# Patient Record
Sex: MALE | Race: WHITE | Employment: OTHER | URBAN - NONMETROPOLITAN AREA
[De-identification: names, ages, dates, MRNs, and addresses within clinical notes are randomized per-mention and may not be internally consistent; named-entity substitution may affect disease eponyms.]

---

## 2012-12-28 LAB
AVERAGE GLUCOSE: NORMAL
HBA1C MFR BLD: 5.4 %

## 2016-07-07 LAB
CHOLESTEROL, TOTAL: 174 MG/DL
CHOLESTEROL/HDL RATIO: 3.3
HDLC SERPL-MCNC: 53 MG/DL (ref 35–70)
LDL CHOLESTEROL CALCULATED: 98.4 MG/DL (ref 0–160)
TRIGL SERPL-MCNC: 113 MG/DL
VLDLC SERPL CALC-MCNC: 23 MG/DL

## 2017-06-08 RX ORDER — LISINOPRIL AND HYDROCHLOROTHIAZIDE 12.5; 1 MG/1; MG/1
1 TABLET ORAL DAILY
Refills: 1 | COMMUNITY
Start: 2017-05-11 | End: 2018-04-10 | Stop reason: SDUPTHER

## 2017-06-08 RX ORDER — MELOXICAM 15 MG/1
1 TABLET ORAL DAILY
Refills: 0 | COMMUNITY
Start: 2017-05-11 | End: 2017-06-08 | Stop reason: SDUPTHER

## 2017-06-08 RX ORDER — PRAVASTATIN SODIUM 40 MG
1 TABLET ORAL DAILY
Refills: 0 | COMMUNITY
Start: 2017-05-11 | End: 2017-08-07 | Stop reason: SDUPTHER

## 2017-06-08 RX ORDER — MELOXICAM 15 MG/1
15 TABLET ORAL DAILY
Qty: 30 TABLET | Refills: 5 | Status: SHIPPED | OUTPATIENT
Start: 2017-06-08 | End: 2017-12-28 | Stop reason: ALTCHOICE

## 2017-07-11 LAB
BUN BLDV-MCNC: NORMAL MG/DL
CALCIUM SERPL-MCNC: NORMAL MG/DL
CHLORIDE BLD-SCNC: NORMAL MMOL/L
CHOLESTEROL, TOTAL: 166 MG/DL
CHOLESTEROL/HDL RATIO: 3.5
CO2: NORMAL MMOL/L
CREAT SERPL-MCNC: NORMAL MG/DL
GFR CALCULATED: NORMAL
GLUCOSE BLD-MCNC: 98 MG/DL
HDLC SERPL-MCNC: 48 MG/DL (ref 35–70)
LDL CHOLESTEROL CALCULATED: 86.6 MG/DL (ref 0–160)
POTASSIUM SERPL-SCNC: NORMAL MMOL/L
SODIUM BLD-SCNC: NORMAL MMOL/L
TRIGL SERPL-MCNC: 157 MG/DL
VLDLC SERPL CALC-MCNC: 31 MG/DL

## 2017-07-24 VITALS
DIASTOLIC BLOOD PRESSURE: 90 MMHG | BODY MASS INDEX: 38.22 KG/M2 | HEIGHT: 70 IN | HEART RATE: 64 BPM | WEIGHT: 267 LBS | SYSTOLIC BLOOD PRESSURE: 136 MMHG

## 2017-07-24 DIAGNOSIS — K57.90 DIVERTICULOSIS OF INTESTINE WITHOUT PERFORATION OR ABSCESS WITHOUT BLEEDING: ICD-10-CM

## 2017-07-24 DIAGNOSIS — E66.9 OBESITY, UNSPECIFIED: ICD-10-CM

## 2017-07-24 DIAGNOSIS — M17.9 OSTEOARTHRITIS OF KNEE, UNSPECIFIED LATERALITY, UNSPECIFIED OSTEOARTHRITIS TYPE: ICD-10-CM

## 2017-07-24 DIAGNOSIS — I10 UNSPECIFIED ESSENTIAL HYPERTENSION: ICD-10-CM

## 2017-07-24 DIAGNOSIS — C61 MALIGNANT NEOPLASM OF PROSTATE (HCC): ICD-10-CM

## 2017-07-24 PROBLEM — E78.5 HYPERLIPIDEMIA: Status: ACTIVE | Noted: 2017-07-24

## 2017-07-24 RX ORDER — UBIQUINOL 100 MG
1 CAPSULE ORAL DAILY
COMMUNITY
End: 2019-10-30 | Stop reason: ALTCHOICE

## 2017-07-24 RX ORDER — OXYBUTYNIN CHLORIDE 5 MG/1
5 TABLET ORAL DAILY
COMMUNITY
End: 2018-11-12 | Stop reason: ALTCHOICE

## 2017-07-24 RX ORDER — ASCORBIC ACID 500 MG
500 TABLET ORAL DAILY
COMMUNITY
End: 2019-06-10 | Stop reason: ALTCHOICE

## 2017-07-24 RX ORDER — THIAMINE MONONITRATE (VIT B1) 100 MG
100 TABLET ORAL DAILY
COMMUNITY
End: 2017-12-28 | Stop reason: ALTCHOICE

## 2017-07-27 ENCOUNTER — OFFICE VISIT (OUTPATIENT)
Dept: FAMILY MEDICINE CLINIC | Age: 72
End: 2017-07-27
Payer: MEDICARE

## 2017-07-27 VITALS
SYSTOLIC BLOOD PRESSURE: 124 MMHG | BODY MASS INDEX: 35.14 KG/M2 | HEIGHT: 71 IN | DIASTOLIC BLOOD PRESSURE: 70 MMHG | HEART RATE: 66 BPM | WEIGHT: 251 LBS

## 2017-07-27 DIAGNOSIS — C61 MALIGNANT NEOPLASM OF PROSTATE (HCC): ICD-10-CM

## 2017-07-27 DIAGNOSIS — E78.2 MIXED HYPERLIPIDEMIA: Primary | ICD-10-CM

## 2017-07-27 DIAGNOSIS — R10.11 RIGHT UPPER QUADRANT PAIN: ICD-10-CM

## 2017-07-27 DIAGNOSIS — K57.90 DIVERTICULOSIS OF INTESTINE WITHOUT PERFORATION OR ABSCESS WITHOUT BLEEDING: ICD-10-CM

## 2017-07-27 DIAGNOSIS — E66.9 OBESITY, UNSPECIFIED: ICD-10-CM

## 2017-07-27 DIAGNOSIS — I10 UNSPECIFIED ESSENTIAL HYPERTENSION: ICD-10-CM

## 2017-07-27 DIAGNOSIS — G47.10 EXCESSIVE SOMNOLENCE DISORDER: ICD-10-CM

## 2017-07-27 DIAGNOSIS — M17.9 OSTEOARTHRITIS OF KNEE, UNSPECIFIED LATERALITY, UNSPECIFIED OSTEOARTHRITIS TYPE: ICD-10-CM

## 2017-07-27 PROCEDURE — 3017F COLORECTAL CA SCREEN DOC REV: CPT | Performed by: FAMILY MEDICINE

## 2017-07-27 PROCEDURE — 99214 OFFICE O/P EST MOD 30 MIN: CPT | Performed by: FAMILY MEDICINE

## 2017-07-27 PROCEDURE — G8417 CALC BMI ABV UP PARAM F/U: HCPCS | Performed by: FAMILY MEDICINE

## 2017-07-27 PROCEDURE — 1036F TOBACCO NON-USER: CPT | Performed by: FAMILY MEDICINE

## 2017-07-27 PROCEDURE — 4040F PNEUMOC VAC/ADMIN/RCVD: CPT | Performed by: FAMILY MEDICINE

## 2017-07-27 PROCEDURE — 1123F ACP DISCUSS/DSCN MKR DOCD: CPT | Performed by: FAMILY MEDICINE

## 2017-07-27 PROCEDURE — G8427 DOCREV CUR MEDS BY ELIG CLIN: HCPCS | Performed by: FAMILY MEDICINE

## 2017-07-27 RX ORDER — UBIDECARENONE 10 MG
1 CAPSULE ORAL DAILY
COMMUNITY

## 2017-07-27 ASSESSMENT — PATIENT HEALTH QUESTIONNAIRE - PHQ9
SUM OF ALL RESPONSES TO PHQ9 QUESTIONS 1 & 2: 0
2. FEELING DOWN, DEPRESSED OR HOPELESS: 0
1. LITTLE INTEREST OR PLEASURE IN DOING THINGS: 0
SUM OF ALL RESPONSES TO PHQ QUESTIONS 1-9: 0

## 2017-07-30 ASSESSMENT — ENCOUNTER SYMPTOMS
BLOOD IN STOOL: 0
ABDOMINAL PAIN: 1
SORE THROAT: 0
ABDOMINAL DISTENTION: 0
SHORTNESS OF BREATH: 0
WHEEZING: 0
CHEST TIGHTNESS: 0
CONSTIPATION: 0

## 2017-08-03 DIAGNOSIS — R10.11 RIGHT UPPER QUADRANT PAIN: ICD-10-CM

## 2017-08-07 DIAGNOSIS — E78.00 PURE HYPERCHOLESTEROLEMIA: Primary | ICD-10-CM

## 2017-08-07 RX ORDER — PRAVASTATIN SODIUM 40 MG
TABLET ORAL
Qty: 30 TABLET | Refills: 5 | Status: SHIPPED | OUTPATIENT
Start: 2017-08-07 | End: 2017-08-11 | Stop reason: SDUPTHER

## 2017-08-10 ENCOUNTER — TELEPHONE (OUTPATIENT)
Dept: FAMILY MEDICINE CLINIC | Age: 72
End: 2017-08-10

## 2017-08-10 DIAGNOSIS — R07.9 CHEST PAIN, UNSPECIFIED TYPE: ICD-10-CM

## 2017-08-10 DIAGNOSIS — R93.2 ABNORMAL ULTRASOUND OF GALLBLADDER: Primary | ICD-10-CM

## 2017-08-11 DIAGNOSIS — E78.00 PURE HYPERCHOLESTEROLEMIA: ICD-10-CM

## 2017-08-11 RX ORDER — PRAVASTATIN SODIUM 40 MG
TABLET ORAL
Qty: 30 TABLET | Refills: 5 | Status: SHIPPED | OUTPATIENT
Start: 2017-08-11 | End: 2017-12-26 | Stop reason: SDUPTHER

## 2017-09-26 ENCOUNTER — OFFICE VISIT (OUTPATIENT)
Dept: FAMILY MEDICINE CLINIC | Age: 72
End: 2017-09-26
Payer: MEDICARE

## 2017-09-26 VITALS
HEART RATE: 60 BPM | SYSTOLIC BLOOD PRESSURE: 112 MMHG | DIASTOLIC BLOOD PRESSURE: 70 MMHG | BODY MASS INDEX: 35.01 KG/M2 | WEIGHT: 251 LBS

## 2017-09-26 DIAGNOSIS — Z23 NEED FOR PROPHYLACTIC VACCINATION AND INOCULATION AGAINST INFLUENZA: ICD-10-CM

## 2017-09-26 DIAGNOSIS — E78.2 MIXED HYPERLIPIDEMIA: ICD-10-CM

## 2017-09-26 DIAGNOSIS — E66.9 OBESITY, UNSPECIFIED: ICD-10-CM

## 2017-09-26 DIAGNOSIS — I49.5 SICK SINUS SYNDROME (HCC): Primary | ICD-10-CM

## 2017-09-26 DIAGNOSIS — I44.2 THIRD DEGREE HEART BLOCK (HCC): ICD-10-CM

## 2017-09-26 DIAGNOSIS — C61 MALIGNANT NEOPLASM OF PROSTATE (HCC): ICD-10-CM

## 2017-09-26 DIAGNOSIS — I10 UNSPECIFIED ESSENTIAL HYPERTENSION: ICD-10-CM

## 2017-09-26 PROCEDURE — 1036F TOBACCO NON-USER: CPT | Performed by: FAMILY MEDICINE

## 2017-09-26 PROCEDURE — G8417 CALC BMI ABV UP PARAM F/U: HCPCS | Performed by: FAMILY MEDICINE

## 2017-09-26 PROCEDURE — G0008 ADMIN INFLUENZA VIRUS VAC: HCPCS | Performed by: FAMILY MEDICINE

## 2017-09-26 PROCEDURE — 90662 IIV NO PRSV INCREASED AG IM: CPT | Performed by: FAMILY MEDICINE

## 2017-09-26 PROCEDURE — 4040F PNEUMOC VAC/ADMIN/RCVD: CPT | Performed by: FAMILY MEDICINE

## 2017-09-26 PROCEDURE — 99214 OFFICE O/P EST MOD 30 MIN: CPT | Performed by: FAMILY MEDICINE

## 2017-09-26 PROCEDURE — 1123F ACP DISCUSS/DSCN MKR DOCD: CPT | Performed by: FAMILY MEDICINE

## 2017-09-26 PROCEDURE — 3017F COLORECTAL CA SCREEN DOC REV: CPT | Performed by: FAMILY MEDICINE

## 2017-09-26 PROCEDURE — G8427 DOCREV CUR MEDS BY ELIG CLIN: HCPCS | Performed by: FAMILY MEDICINE

## 2017-09-28 PROBLEM — I49.5 SICK SINUS SYNDROME (HCC): Status: ACTIVE | Noted: 2017-09-28

## 2017-09-28 PROBLEM — I44.2 THIRD DEGREE HEART BLOCK (HCC): Status: ACTIVE | Noted: 2017-08-01

## 2017-09-28 ASSESSMENT — ENCOUNTER SYMPTOMS
CHEST TIGHTNESS: 0
SORE THROAT: 0
WHEEZING: 0
ABDOMINAL DISTENTION: 0
ABDOMINAL PAIN: 0
SHORTNESS OF BREATH: 0

## 2017-10-10 DIAGNOSIS — I49.5 SICK SINUS SYNDROME (HCC): Primary | ICD-10-CM

## 2017-10-11 ENCOUNTER — HOSPITAL ENCOUNTER (OUTPATIENT)
Dept: PULMONOLOGY | Age: 72
Discharge: HOME | End: 2017-10-11
Payer: MEDICARE

## 2017-10-24 ENCOUNTER — TELEPHONE (OUTPATIENT)
Dept: FAMILY MEDICINE CLINIC | Age: 72
End: 2017-10-24

## 2017-10-24 NOTE — TELEPHONE ENCOUNTER
recv'd call from pharmacy that pt was recently started on eliquis and there is a drug interaction with his meloxicam and wondering if pt should stop the meloxicam?

## 2017-12-26 DIAGNOSIS — E78.00 PURE HYPERCHOLESTEROLEMIA: ICD-10-CM

## 2017-12-26 RX ORDER — PRAVASTATIN SODIUM 40 MG
TABLET ORAL
Qty: 90 TABLET | Refills: 3 | Status: SHIPPED | OUTPATIENT
Start: 2017-12-26 | End: 2018-04-16 | Stop reason: SINTOL

## 2017-12-28 ENCOUNTER — OFFICE VISIT (OUTPATIENT)
Dept: FAMILY MEDICINE CLINIC | Age: 72
End: 2017-12-28
Payer: MEDICARE

## 2017-12-28 VITALS
HEART RATE: 60 BPM | BODY MASS INDEX: 35.98 KG/M2 | RESPIRATION RATE: 12 BRPM | SYSTOLIC BLOOD PRESSURE: 124 MMHG | DIASTOLIC BLOOD PRESSURE: 68 MMHG | HEIGHT: 71 IN | WEIGHT: 257 LBS

## 2017-12-28 DIAGNOSIS — I49.5 SICK SINUS SYNDROME (HCC): ICD-10-CM

## 2017-12-28 DIAGNOSIS — E78.2 MIXED HYPERLIPIDEMIA: ICD-10-CM

## 2017-12-28 DIAGNOSIS — G31.84 MILD COGNITIVE IMPAIRMENT WITH MEMORY LOSS: ICD-10-CM

## 2017-12-28 DIAGNOSIS — I10 ESSENTIAL HYPERTENSION: Primary | ICD-10-CM

## 2017-12-28 DIAGNOSIS — I48.0 PAROXYSMAL ATRIAL FIBRILLATION (HCC): ICD-10-CM

## 2017-12-28 DIAGNOSIS — C61 MALIGNANT NEOPLASM OF PROSTATE (HCC): ICD-10-CM

## 2017-12-28 DIAGNOSIS — I44.2 THIRD DEGREE HEART BLOCK (HCC): ICD-10-CM

## 2017-12-28 PROCEDURE — G8427 DOCREV CUR MEDS BY ELIG CLIN: HCPCS | Performed by: FAMILY MEDICINE

## 2017-12-28 PROCEDURE — G8417 CALC BMI ABV UP PARAM F/U: HCPCS | Performed by: FAMILY MEDICINE

## 2017-12-28 PROCEDURE — 99214 OFFICE O/P EST MOD 30 MIN: CPT | Performed by: FAMILY MEDICINE

## 2017-12-28 PROCEDURE — 1123F ACP DISCUSS/DSCN MKR DOCD: CPT | Performed by: FAMILY MEDICINE

## 2017-12-28 PROCEDURE — 4040F PNEUMOC VAC/ADMIN/RCVD: CPT | Performed by: FAMILY MEDICINE

## 2017-12-28 PROCEDURE — G8484 FLU IMMUNIZE NO ADMIN: HCPCS | Performed by: FAMILY MEDICINE

## 2017-12-28 PROCEDURE — 3017F COLORECTAL CA SCREEN DOC REV: CPT | Performed by: FAMILY MEDICINE

## 2017-12-28 PROCEDURE — 1036F TOBACCO NON-USER: CPT | Performed by: FAMILY MEDICINE

## 2017-12-28 RX ORDER — APIXABAN 5 MG/1
5 TABLET, FILM COATED ORAL 2 TIMES DAILY
Refills: 0 | COMMUNITY
Start: 2017-12-22

## 2017-12-28 ASSESSMENT — ENCOUNTER SYMPTOMS
SORE THROAT: 0
WHEEZING: 0
ABDOMINAL PAIN: 0
ABDOMINAL DISTENTION: 0
CHEST TIGHTNESS: 0
SHORTNESS OF BREATH: 0

## 2017-12-28 NOTE — PROGRESS NOTES
[address] [address] [address]  Dept: [phone]  Dept Fax: [phone]    Honey Alicia is a 67 y.o. male who presents today for his medical conditions/complaints as noted below. Honey Alicia is c/o of Follow-up (3 month) and Other (wife is concerned with short term memory loss and has had follow up with cardiology)      HPI:     HPI   Patient is here for a routine follow up   Afib  Anticoagulation therapy - on eliquis ; Dr Pool Looney started at his last visit ;in October Dx paroxysmal Afib   Palpitations - none   SOB - none  Syncope -   SHONDA -   Cardiologist - Dr Pool Looney   Echo -   Holter -   Stress test -   Cardiac cath -   Hypertension  Rarely checks   well controlled; BP: 124/68   No problems with medication side effects; tolerating well  No chest pain  No edema   Hyperlipidemia   Well controlled;  Cholesterol 166; HDL 48; LDL 86   Medication side effects -   Cardiovascular risk factors -   Chest pain -   Claudication -   SHONDA  None ; he cancelled his appointment with Dr Jill Tejeda think he has SHONDA; ever since pacemaker placed he hasn't had any of those spells ; but notes from Dr Pool Looney would suggest otherwise  Feels rested upon awakening  Denies excessive somnolence  Last evaluated  Pulmonologist: none   Pulmonary function tests: none    Wife reports patient is having problems with his short term memory and wants to try prevagen  Patient doesn't think there is any problem     BP Readings from Last 3 Encounters:   12/28/17 124/68   09/26/17 112/70   07/27/17 124/70            (goal 120/80)    Past Medical History:   Diagnosis Date    Diverticulosis     Hyperlipidemia     Low HDL    Hypertension     Hyperuricemia     Obesity     Osteoarthrosis     not designated as generalized or localized    Prostate cancer ([facility] [address] 2008    Third degree heart block ([facility] [address] 08/2017      Past Surgical History:   Procedure Laterality Date    A-V CARDIAC

## 2018-03-05 DIAGNOSIS — M17.10 PRIMARY LOCALIZED OSTEOARTHROSIS OF LOWER LEG, UNSPECIFIED LATERALITY: Primary | ICD-10-CM

## 2018-03-05 RX ORDER — MELOXICAM 15 MG/1
TABLET ORAL
Qty: 30 TABLET | Refills: 5 | Status: SHIPPED | OUTPATIENT
Start: 2018-03-05 | End: 2019-05-21 | Stop reason: SDUPTHER

## 2018-03-05 NOTE — TELEPHONE ENCOUNTER
Pradeep Pina is calling to request a refill on the following medication(s):  Requested Prescriptions     Pending Prescriptions Disp Refills    meloxicam (MOBIC) 15 MG tablet [Pharmacy Med Name: MELOXICAM 15 MG TABLET] 30 tablet 5     Sig: take 1 tablet by mouth once daily       Last Visit Date (If Applicable):  15/56/4424    Next Visit Date:    3/30/2018

## 2018-03-14 DIAGNOSIS — E78.2 MIXED HYPERLIPIDEMIA: ICD-10-CM

## 2018-03-14 DIAGNOSIS — I10 ESSENTIAL HYPERTENSION: ICD-10-CM

## 2018-03-14 DIAGNOSIS — R07.9 CHEST PAIN, UNSPECIFIED TYPE: ICD-10-CM

## 2018-03-14 LAB
A/G RATIO: 1.6 RATIO
AGE FOR GFR: 72
ALBUMIN: 4.2 G/DL
ALK PHOSPHATASE: 170 UNITS/L
ALT SERPL-CCNC: 384 UNITS/L
ANION GAP SERPL CALCULATED.3IONS-SCNC: 18 MMOL/L
AST SERPL-CCNC: 216 UNITS/L
BASOPHILS # BLD: 0.06 THOU/MM3
BILIRUB SERPL-MCNC: 0.8 MG/DL
BUN BLDV-MCNC: 19 MG/DL
CALCIUM SERPL-MCNC: 9.7 MG/DL
CHLORIDE BLD-SCNC: 99 MMOL/L
CHOLESTEROL/HDL RATIO: 3.8 RATIO
CHOLESTEROL: 192 MG/DL
CO2: 27 MMOL/L
CREAT SERPL-MCNC: 1.3 MG/DL
DIFFERENTIAL: AUTOMATED DIFF
EGFR BF: 49 ML/MIN/1.73 M2
EGFR BM: 66 ML/MIN/1.73 M2
EGFR WF: 40 ML/MIN/1.73 M2
EGFR WM: 54 ML/MIN/1.73 M2
EOSINOPHIL # BLD: 0.2 THOU/MM3
GLOBULIN: 2.6 G/DL
GLUCOSE: 96 MG/DL
HCT VFR BLD CALC: 40.9 %
HDL, DIRECT: 51 MG/DL
HEMOGLOBIN: 13.5 G/DL
LDL CHOLESTEROL CALCULATED: 116.4 MG/DL
LYMPHOCYTES # BLD: 0.76 THOU/MM3
MCH RBC QN AUTO: 29.8 PG
MCHC RBC AUTO-ENTMCNC: 33 G/DL
MCV RBC AUTO: 90.1 FL
MONOCYTES # BLD: 0.61 THOU/MM3
NEUTROPHILS: 4.91 THOU/MM3
PDW BLD-RTO: 11.9 %
PLATELET # BLD: 194 THOU/MM3
PMV BLD AUTO: 9.3 FL
POTASSIUM SERPL-SCNC: 4.8 MMOL/L
RBC # BLD: 4.54 M/UL
SODIUM BLD-SCNC: 139 MMOL/L
TOTAL PROTEIN: 6.8 G/DL
TRIGL SERPL-MCNC: 123 MG/DL
VLDLC SERPL CALC-MCNC: 25 MG/DL
WBC # BLD: 6.54 THOU/ML3

## 2018-03-19 ENCOUNTER — TELEPHONE (OUTPATIENT)
Dept: FAMILY MEDICINE CLINIC | Age: 73
End: 2018-03-19

## 2018-04-10 DIAGNOSIS — I10 ESSENTIAL HYPERTENSION: Primary | ICD-10-CM

## 2018-04-10 RX ORDER — LISINOPRIL AND HYDROCHLOROTHIAZIDE 12.5; 1 MG/1; MG/1
TABLET ORAL
Qty: 30 TABLET | Refills: 11 | Status: SHIPPED | OUTPATIENT
Start: 2018-04-10 | End: 2019-04-03 | Stop reason: SDUPTHER

## 2018-04-16 ENCOUNTER — OFFICE VISIT (OUTPATIENT)
Dept: FAMILY MEDICINE CLINIC | Age: 73
End: 2018-04-16
Payer: MEDICARE

## 2018-04-16 VITALS
DIASTOLIC BLOOD PRESSURE: 76 MMHG | SYSTOLIC BLOOD PRESSURE: 126 MMHG | HEART RATE: 64 BPM | BODY MASS INDEX: 33.17 KG/M2 | WEIGHT: 237.8 LBS

## 2018-04-16 DIAGNOSIS — E66.09 CLASS 1 OBESITY DUE TO EXCESS CALORIES WITHOUT SERIOUS COMORBIDITY WITH BODY MASS INDEX (BMI) OF 33.0 TO 33.9 IN ADULT: ICD-10-CM

## 2018-04-16 DIAGNOSIS — Z12.5 SCREENING PSA (PROSTATE SPECIFIC ANTIGEN): ICD-10-CM

## 2018-04-16 DIAGNOSIS — I10 ESSENTIAL HYPERTENSION: Primary | ICD-10-CM

## 2018-04-16 DIAGNOSIS — I49.5 SICK SINUS SYNDROME (HCC): ICD-10-CM

## 2018-04-16 DIAGNOSIS — E78.2 MIXED HYPERLIPIDEMIA: ICD-10-CM

## 2018-04-16 DIAGNOSIS — C61 MALIGNANT NEOPLASM OF PROSTATE (HCC): ICD-10-CM

## 2018-04-16 DIAGNOSIS — R32 URINARY INCONTINENCE, UNSPECIFIED TYPE: ICD-10-CM

## 2018-04-16 DIAGNOSIS — K80.71 CALCULUS OF GALLBLADDER AND BILE DUCT WITH OBSTRUCTION WITHOUT CHOLECYSTITIS: ICD-10-CM

## 2018-04-16 DIAGNOSIS — I44.2 THIRD DEGREE HEART BLOCK (HCC): ICD-10-CM

## 2018-04-16 LAB
A/G RATIO: 1.5 RATIO
ALBUMIN: 4.1 G/DL
ALK PHOSPHATASE: 51 UNITS/L
ALT SERPL-CCNC: 33 UNITS/L
AST SERPL-CCNC: 20 UNITS/L
BILIRUB SERPL-MCNC: 0.4 MG/DL
BILIRUBIN DIRECT: 0 MG/DL
GLOBULIN: 2.8 G/DL
SCREENING PSA: <0.06 NG/ML
TOTAL PROTEIN: 6.9 G/DL

## 2018-04-16 PROCEDURE — 1123F ACP DISCUSS/DSCN MKR DOCD: CPT | Performed by: FAMILY MEDICINE

## 2018-04-16 PROCEDURE — 99214 OFFICE O/P EST MOD 30 MIN: CPT | Performed by: FAMILY MEDICINE

## 2018-04-16 PROCEDURE — G8417 CALC BMI ABV UP PARAM F/U: HCPCS | Performed by: FAMILY MEDICINE

## 2018-04-16 PROCEDURE — 1036F TOBACCO NON-USER: CPT | Performed by: FAMILY MEDICINE

## 2018-04-16 PROCEDURE — 4040F PNEUMOC VAC/ADMIN/RCVD: CPT | Performed by: FAMILY MEDICINE

## 2018-04-16 PROCEDURE — 3017F COLORECTAL CA SCREEN DOC REV: CPT | Performed by: FAMILY MEDICINE

## 2018-04-16 PROCEDURE — G8427 DOCREV CUR MEDS BY ELIG CLIN: HCPCS | Performed by: FAMILY MEDICINE

## 2018-04-16 ASSESSMENT — ENCOUNTER SYMPTOMS
SHORTNESS OF BREATH: 0
CHEST TIGHTNESS: 0
WHEEZING: 0
ABDOMINAL DISTENTION: 0
ABDOMINAL PAIN: 0
SORE THROAT: 0

## 2018-08-20 ENCOUNTER — OFFICE VISIT (OUTPATIENT)
Dept: FAMILY MEDICINE CLINIC | Age: 73
End: 2018-08-20
Payer: MEDICARE

## 2018-08-20 VITALS
OXYGEN SATURATION: 95 % | HEART RATE: 88 BPM | WEIGHT: 234 LBS | BODY MASS INDEX: 32.64 KG/M2 | DIASTOLIC BLOOD PRESSURE: 76 MMHG | SYSTOLIC BLOOD PRESSURE: 130 MMHG

## 2018-08-20 DIAGNOSIS — I49.5 SICK SINUS SYNDROME (HCC): ICD-10-CM

## 2018-08-20 DIAGNOSIS — Z12.11 ENCOUNTER FOR SCREENING COLONOSCOPY: ICD-10-CM

## 2018-08-20 DIAGNOSIS — I44.2 THIRD DEGREE HEART BLOCK (HCC): ICD-10-CM

## 2018-08-20 DIAGNOSIS — R32 URINARY INCONTINENCE, UNSPECIFIED TYPE: ICD-10-CM

## 2018-08-20 DIAGNOSIS — C61 MALIGNANT NEOPLASM OF PROSTATE (HCC): ICD-10-CM

## 2018-08-20 DIAGNOSIS — G47.10 EXCESSIVE SOMNOLENCE DISORDER: ICD-10-CM

## 2018-08-20 DIAGNOSIS — I10 ESSENTIAL HYPERTENSION: Primary | ICD-10-CM

## 2018-08-20 DIAGNOSIS — Z11.59 NEED FOR HEPATITIS C SCREENING TEST: ICD-10-CM

## 2018-08-20 DIAGNOSIS — E78.2 MIXED HYPERLIPIDEMIA: ICD-10-CM

## 2018-08-20 DIAGNOSIS — R41.3 SHORT-TERM MEMORY LOSS: ICD-10-CM

## 2018-08-20 PROCEDURE — 99214 OFFICE O/P EST MOD 30 MIN: CPT | Performed by: FAMILY MEDICINE

## 2018-08-20 PROCEDURE — 1123F ACP DISCUSS/DSCN MKR DOCD: CPT | Performed by: FAMILY MEDICINE

## 2018-08-20 PROCEDURE — 1101F PT FALLS ASSESS-DOCD LE1/YR: CPT | Performed by: FAMILY MEDICINE

## 2018-08-20 PROCEDURE — G0444 DEPRESSION SCREEN ANNUAL: HCPCS | Performed by: FAMILY MEDICINE

## 2018-08-20 PROCEDURE — 1036F TOBACCO NON-USER: CPT | Performed by: FAMILY MEDICINE

## 2018-08-20 PROCEDURE — G8417 CALC BMI ABV UP PARAM F/U: HCPCS | Performed by: FAMILY MEDICINE

## 2018-08-20 PROCEDURE — 4040F PNEUMOC VAC/ADMIN/RCVD: CPT | Performed by: FAMILY MEDICINE

## 2018-08-20 PROCEDURE — 3017F COLORECTAL CA SCREEN DOC REV: CPT | Performed by: FAMILY MEDICINE

## 2018-08-20 PROCEDURE — G8427 DOCREV CUR MEDS BY ELIG CLIN: HCPCS | Performed by: FAMILY MEDICINE

## 2018-08-20 RX ORDER — OMEGA-3S/DHA/EPA/FISH OIL/D3 300MG-1000
400 CAPSULE ORAL DAILY
COMMUNITY
End: 2019-06-10 | Stop reason: ALTCHOICE

## 2018-08-20 RX ORDER — DONEPEZIL HYDROCHLORIDE 5 MG/1
5 TABLET, FILM COATED ORAL NIGHTLY
Qty: 30 TABLET | Refills: 3 | Status: SHIPPED | OUTPATIENT
Start: 2018-08-20 | End: 2018-12-30 | Stop reason: SDUPTHER

## 2018-08-20 RX ORDER — CHLORAL HYDRATE 500 MG
3000 CAPSULE ORAL 3 TIMES DAILY
COMMUNITY
End: 2019-06-10 | Stop reason: ALTCHOICE

## 2018-08-20 RX ORDER — LANOLIN ALCOHOL/MO/W.PET/CERES
1000 CREAM (GRAM) TOPICAL DAILY
COMMUNITY

## 2018-08-20 ASSESSMENT — PATIENT HEALTH QUESTIONNAIRE - PHQ9
6. FEELING BAD ABOUT YOURSELF - OR THAT YOU ARE A FAILURE OR HAVE LET YOURSELF OR YOUR FAMILY DOWN: 1
8. MOVING OR SPEAKING SO SLOWLY THAT OTHER PEOPLE COULD HAVE NOTICED. OR THE OPPOSITE, BEING SO FIGETY OR RESTLESS THAT YOU HAVE BEEN MOVING AROUND A LOT MORE THAN USUAL: 0
3. TROUBLE FALLING OR STAYING ASLEEP: 0
9. THOUGHTS THAT YOU WOULD BE BETTER OFF DEAD, OR OF HURTING YOURSELF: 0
SUM OF ALL RESPONSES TO PHQ9 QUESTIONS 1 & 2: 4
1. LITTLE INTEREST OR PLEASURE IN DOING THINGS: 2
SUM OF ALL RESPONSES TO PHQ QUESTIONS 1-9: 8
10. IF YOU CHECKED OFF ANY PROBLEMS, HOW DIFFICULT HAVE THESE PROBLEMS MADE IT FOR YOU TO DO YOUR WORK, TAKE CARE OF THINGS AT HOME, OR GET ALONG WITH OTHER PEOPLE: 0
2. FEELING DOWN, DEPRESSED OR HOPELESS: 2
7. TROUBLE CONCENTRATING ON THINGS, SUCH AS READING THE NEWSPAPER OR WATCHING TELEVISION: 1
SUM OF ALL RESPONSES TO PHQ QUESTIONS 1-9: 8
5. POOR APPETITE OR OVEREATING: 0
4. FEELING TIRED OR HAVING LITTLE ENERGY: 2

## 2018-08-21 ASSESSMENT — ENCOUNTER SYMPTOMS
SHORTNESS OF BREATH: 0
APNEA: 0
ABDOMINAL PAIN: 0
CHEST TIGHTNESS: 0
ABDOMINAL DISTENTION: 0
WHEEZING: 0
SORE THROAT: 0

## 2018-08-21 NOTE — PROGRESS NOTES
to have some apneic spells then. He has refused workup to date but today is agreeable. His wife says he does not snore quite as heavily. She does not know if he is choking in his sleep but she and the patient are in agreement that they would like to proceed with sleep testing. Short-term memory loss  As noted above, the addition of Prevagen is of questionable efficacy. The patient and wife are agreeable to trying something else to help what is mostly short-term memory. It sounds like he has good long term memory or distant memory. He does not get lost.  He can remember directions. No behavioral changes. BP Readings from Last 3 Encounters:   18 130/76   18 126/76   17 124/68            (goal 120/80)    Past Medical History:   Diagnosis Date    Diverticulosis     Hyperlipidemia     Low HDL    Hypertension     Hyperuricemia     Obesity     Osteoarthrosis     not designated as generalized or localized    Prostate cancer ([address]     Third degree heart block ([address] 2017      Past Surgical History:   Procedure Laterality Date    [facility] Thaddeus Lee;  [facility]    COLONOSCOPY  2007    diverticulosis    FIBULA FRACTURE SURGERY Right 2000    [facility] [place] [facility]    HIP ARTHROPLASTY Bilateral ,    Dr Syed Gambino  2017    PROSTATE BIOPSY  07/10/2008    PROSTATECTOMY  2008    w/lymph node dissection Dr Haley Garcia Right 10/2013    Dr Lilia Barrientos,     Flexible     Family History   Problem Relation Age of Onset    Alzheimer's Disease Mother     Emphysema Father     Other Father         vascular disease- of aneurysm     Social History   Substance Use Topics    Smoking status: Former Smoker     Packs/day: 2.00     Years: 15.00     Quit date: 10/18/1981    Smokeless tobacco: Never Used    Alcohol use No        Current Outpatient Prescriptions   Medication Sig Dispense Refill    vitamin B-12 (CYANOCOBALAMIN) 1000 MCG tablet Take 1,000 mcg by mouth daily      vitamin D3 (CHOLECALCIFEROL) 400 units TABS tablet Take 400 Units by mouth daily      Omega-3 Fatty Acids (FISH OIL) 1000 MG CAPS Take 3,000 mg by mouth 3 times daily      donepezil (ARICEPT) 5 MG tablet Take 1 tablet by mouth nightly 30 tablet 3    Mirabegron ER 25 MG TB24 Take 1 tablet by mouth daily 30 tablet 5    lisinopril-hydrochlorothiazide (PRINZIDE;ZESTORETIC) 10-12.5 MG per tablet take 1 tablet by mouth once daily 30 tablet 11    ELIQUIS 5 MG TABS tablet Take 5 mg by mouth daily  0    Coenzyme Q10 10 MG CAPS Take 1 capsule by mouth daily      aspirin 81 MG tablet Take 81 mg by mouth daily      Ascorbic Acid (VITAMIN C) 500 MG tablet Take 500 mg by mouth daily      oxybutynin (DITROPAN) 5 MG tablet Take 5 mg by mouth daily Indications: Extended Release      Glucosamine 750 MG TABS Take 1 capsule by mouth daily      meloxicam (MOBIC) 15 MG tablet take 1 tablet by mouth once daily 30 tablet 5     No current facility-administered medications for this visit.       Allergies   Allergen Reactions    No Known Allergies      Other reaction(s): Unknown Reaction    Pravastatin        Health Maintenance   Topic Date Due    Hepatitis C screen  1945    DTaP/Tdap/Td vaccine (1 - Tdap) 08/21/2008    Shingles Vaccine (1 of 2 - 2 Dose Series) 01/23/2010    Colon cancer screen colonoscopy  01/11/2017    Flu vaccine (1) 09/01/2018    Potassium monitoring  03/14/2019    Creatinine monitoring  03/14/2019    Lipid screen  03/14/2023    Pneumococcal low/med risk  Completed    AAA screen  Completed       Lab Results   Component Value Date    K 4.8 03/14/2018    CREATININE 1.3 03/14/2018    AST 20 04/16/2018    ALT 33 04/16/2018    HCT 41.4 07/06/2018    LABA1C 5.4 12/28/2012    GLUCOSE 96 03/14/2018    CALCIUM 9.7 03/14/2018      Lab Results   Component Value Date    CHOL 192 03/14/2018    CHOL 166

## 2018-08-22 LAB
HEPATITIS C IGG: NORMAL
SIGNAL/CUTOFF: NORMAL

## 2018-09-04 ENCOUNTER — OFFICE VISIT (OUTPATIENT)
Dept: SURGERY | Age: 73
End: 2018-09-04

## 2018-09-04 VITALS
SYSTOLIC BLOOD PRESSURE: 112 MMHG | TEMPERATURE: 96 F | WEIGHT: 234 LBS | HEIGHT: 71 IN | HEART RATE: 59 BPM | BODY MASS INDEX: 32.76 KG/M2 | DIASTOLIC BLOOD PRESSURE: 67 MMHG

## 2018-09-04 DIAGNOSIS — Z12.11 ENCOUNTER FOR SCREENING COLONOSCOPY: Primary | ICD-10-CM

## 2018-09-04 PROCEDURE — 99999 PR OFFICE/OUTPT VISIT,PROCEDURE ONLY: CPT | Performed by: SURGERY

## 2018-09-04 PROCEDURE — 1101F PT FALLS ASSESS-DOCD LE1/YR: CPT | Performed by: SURGERY

## 2018-09-04 PROCEDURE — 1123F ACP DISCUSS/DSCN MKR DOCD: CPT | Performed by: SURGERY

## 2018-09-04 PROCEDURE — G8417 CALC BMI ABV UP PARAM F/U: HCPCS | Performed by: SURGERY

## 2018-09-04 PROCEDURE — G8427 DOCREV CUR MEDS BY ELIG CLIN: HCPCS | Performed by: SURGERY

## 2018-09-04 PROCEDURE — 4040F PNEUMOC VAC/ADMIN/RCVD: CPT | Performed by: SURGERY

## 2018-09-04 PROCEDURE — 1036F TOBACCO NON-USER: CPT | Performed by: SURGERY

## 2018-09-04 PROCEDURE — 3017F COLORECTAL CA SCREEN DOC REV: CPT | Performed by: SURGERY

## 2018-09-04 NOTE — PROGRESS NOTES
[address] [facility]    Patient's Name/Date of Birth: Wander Floyd / 1945 (1 y.o.)    Date: September 4, 2018     HPI: Pt is a 67 y.o. male who presents to [address] [place] [address] for evaluation for screening colonoscopy, he is here with his wife. Last colonoscopy was ~10yrs ago, he is unsure of what was found at that time. Has regular BM that is soft without blood/black stool. Pacemaker placement was ~1 year ago, currently on Eliquis and Aspirin. No other complaints at this time. Past Medical History:   Diagnosis Date    Diverticulosis     Hyperlipidemia     Low HDL    Hypertension     Hyperuricemia     Obesity     Osteoarthrosis     not designated as generalized or localized    Prostate cancer ([facility] [address] 2008    Third degree heart block ([facility] [address] 08/2017       Past Surgical History:   Procedure Laterality Date    A-V CARDIAC PACEMAKER INSERTION      Dr Leigha Calderón;  [facility]    COLONOSCOPY  01/2007    diverticulosis    FIBULA FRACTURE SURGERY Right 08/2000    [facility] [place] [facility]    HIP ARTHROPLASTY Bilateral 04/12,09/12    Dr Josué Robleor  08/27/2017    PROSTATE BIOPSY  07/10/2008    PROSTATECTOMY  2008    w/lymph node dissection Dr Kayla Guy Right 10/2013    Dr Lai Mendoza, 2000    Flexible       Current Outpatient Prescriptions   Medication Sig Dispense Refill    vitamin B-12 (CYANOCOBALAMIN) 1000 MCG tablet Take 1,000 mcg by mouth daily      vitamin D3 (CHOLECALCIFEROL) 400 units TABS tablet Take 400 Units by mouth daily      Omega-3 Fatty Acids (FISH OIL) 1000 MG CAPS Take 3,000 mg by mouth 3 times daily      donepezil (ARICEPT) 5 MG tablet Take 1 tablet by mouth nightly 30 tablet 3    Mirabegron ER 25 MG TB24 Take 1 tablet by mouth daily 30 tablet 5    lisinopril-hydrochlorothiazide (PRINZIDE;ZESTORETIC) 10-12.5 MG per tablet take 1 tablet by mouth once daily 30 tablet 11    ELIQUIS 5 MG TABS tablet Take 5 mg by mouth daily  0    Coenzyme Q10 10 MG CAPS Take 1 capsule by mouth daily      aspirin 81 MG tablet Take 81 mg by mouth daily      Ascorbic Acid (VITAMIN C) 500 MG tablet Take 500 mg by mouth daily      oxybutynin (DITROPAN) 5 MG tablet Take 5 mg by mouth daily Indications: Extended Release      Glucosamine 750 MG TABS Take 1 capsule by mouth daily      meloxicam (MOBIC) 15 MG tablet take 1 tablet by mouth once daily 30 tablet 5     No current facility-administered medications for this visit. Allergies   Allergen Reactions    No Known Allergies      Other reaction(s): Unknown Reaction    Pravastatin        Family History   Problem Relation Age of Onset    Alzheimer's Disease Mother     Emphysema Father     Other Father         vascular disease- of aneurysm       Social History     Social History    Marital status:      Spouse name: N/A    Number of children: N/A    Years of education: N/A     Occupational History    Not on file.      Social History Main Topics    Smoking status: Former Smoker     Packs/day: 2.00     Years: 15.00     Quit date: 10/18/1981    Smokeless tobacco: Never Used    Alcohol use No    Drug use: No    Sexual activity: Not on file     Other Topics Concern    Not on file     Social History Narrative    No narrative on file       ROS:     General ROS: negative for - chills, fatigue, fever, hot flashes, malaise, night sweats, sleep disturbance, weight gain or weight loss  Psychological ROS: negative for - anxiety or   Ophthalmic ROS: negative for - blurry vision, decreased vision, double vision  ENT ROS: negative for - epistaxis, headaches, hearing change, nasal discharge  Hematological and Lymphatic ROS: negative for - bleeding problems, bruising, fatigue, jaundice, night sweats, swollen lymph nodes   Endocrine ROS: negative for - hair pattern changes, hot flashes, malaise/lethargy, mood swings, palpitations, polydipsia/polyuria,   Respiratory ROS: no cough, shortness of breath, or wheezing  Cardiovascular ROS: no chest pain or dyspnea on exertion  Gastrointestinal ROS: no abdominal pain, change in bowel habits, or black or bloody stools  Genito-Urinary ROS: no dysuria, trouble voiding, or hematuria  Musculoskeletal ROS: negative for - gait disturbance, joint pain, joint stiffness, joint swelling, muscle pain, muscular weakness  Neurological ROS: no TIA or stroke symptoms  Dermatological ROS: negative for - rash or skin lesion changes    Physical Exam:  Vitals:    09/04/18 0911   BP: 112/67   Pulse: 59   Temp: 96 °F (35.6 °C)       General:A & O x3  HEENT:  NCAT, PERRL, EMOI, oral mucus membrane pink and moist, no mass palpated on neck exam  Heart: S1S2  Lungs: bilateral chest rise with normal respiratory effort  Abdomen: soft, nontender, no guarding, no rebound, no masses  Extremity: No peripheral edema  SKIN: Warm, dry  Neuro: CN II-XII grossly intact. No focal deficits. Assessment      Diagnosis Orders   1. Encounter for screening colonoscopy         Plan   1. Pt does want time to think about having another colonoscopy, in the meantime will Rx golytely prep and plan to see the pt for procedure sometime in November.   2. Pt will need clearance from cardiology to hold Eliquis for 5 days prior to procedure    Macy Garcia  9/4/2018

## 2018-09-04 NOTE — PATIENT INSTRUCTIONS
Patient Education        Learning About Colonoscopy  What is a colonoscopy? A colonoscopy is a test (also called a procedure) that lets a doctor look inside your large intestine. The doctor uses a thin, lighted tube called a colonoscope. The doctor uses it to look for small growths called polyps, colon or rectal cancer (colorectal cancer), or other problems like bleeding. During the procedure, the doctor can take samples of tissue. The samples can then be checked for cancer or other conditions. The doctor can also take out polyps. How is colonoscopy done? This procedure is done in a doctor's office or a clinic or hospital. You will get medicine to help you relax and not feel pain. Some people find that they do not remember having the test because of the medicine. The doctor gently moves the colonoscope, or scope, through the colon. The scope is also a small video camera. It lets the doctor see the colon and take pictures. A colonoscopy usually takes 30 to 45 minutes. It may take longer if the doctor has to remove polyps. How do you prepare for the procedure? You need to clean out your colon before the procedure so the doctor can see all of your colon. You may start the cleaning process a day or two before the test. This depends on which \"colon prep\" your doctor recommends. To clean your colon, you stop eating solid foods and drink only clear liquids. You can have water, tea, coffee, clear juices, clear broths, flavored ice pops, and gelatin (such as Jell-O). Do not drink anything red or purple, such as grape juice or fruit punch. And do not eat red or purple foods, such as grape ice pops or cherry gelatin. The day or night before the procedure, you drink a large amount of a special liquid. This causes loose, frequent stools. You will go to the bathroom a lot. It is very important to drink all of the colon prep liquid. If you have problems drinking the liquid, call your doctor.   For many people, the prep

## 2018-09-06 DIAGNOSIS — R32 URINARY INCONTINENCE, UNSPECIFIED TYPE: ICD-10-CM

## 2018-09-07 RX ORDER — MIRABEGRON 25 MG/1
25 TABLET, FILM COATED, EXTENDED RELEASE ORAL DAILY
Qty: 30 TABLET | Refills: 5 | Status: SHIPPED | OUTPATIENT
Start: 2018-09-07 | End: 2019-03-04 | Stop reason: SDUPTHER

## 2018-09-25 ENCOUNTER — OFFICE VISIT (OUTPATIENT)
Dept: PULMONOLOGY | Age: 73
End: 2018-09-25
Payer: MEDICARE

## 2018-09-25 VITALS
WEIGHT: 236.6 LBS | HEIGHT: 71 IN | OXYGEN SATURATION: 96 % | SYSTOLIC BLOOD PRESSURE: 114 MMHG | BODY MASS INDEX: 33.12 KG/M2 | HEART RATE: 57 BPM | DIASTOLIC BLOOD PRESSURE: 70 MMHG

## 2018-09-25 DIAGNOSIS — G47.30 SLEEP-RELATED BREATHING DISORDER: Primary | ICD-10-CM

## 2018-09-25 DIAGNOSIS — I10 ESSENTIAL HYPERTENSION: ICD-10-CM

## 2018-09-25 DIAGNOSIS — G47.10 HYPERSOMNIA: ICD-10-CM

## 2018-09-25 PROCEDURE — 99204 OFFICE O/P NEW MOD 45 MIN: CPT | Performed by: INTERNAL MEDICINE

## 2018-09-25 PROCEDURE — G8427 DOCREV CUR MEDS BY ELIG CLIN: HCPCS | Performed by: INTERNAL MEDICINE

## 2018-09-25 PROCEDURE — G8417 CALC BMI ABV UP PARAM F/U: HCPCS | Performed by: INTERNAL MEDICINE

## 2018-09-25 PROCEDURE — 1123F ACP DISCUSS/DSCN MKR DOCD: CPT | Performed by: INTERNAL MEDICINE

## 2018-09-25 PROCEDURE — 3017F COLORECTAL CA SCREEN DOC REV: CPT | Performed by: INTERNAL MEDICINE

## 2018-09-25 PROCEDURE — 1036F TOBACCO NON-USER: CPT | Performed by: INTERNAL MEDICINE

## 2018-09-25 PROCEDURE — 1101F PT FALLS ASSESS-DOCD LE1/YR: CPT | Performed by: INTERNAL MEDICINE

## 2018-09-25 PROCEDURE — 4040F PNEUMOC VAC/ADMIN/RCVD: CPT | Performed by: INTERNAL MEDICINE

## 2018-09-25 NOTE — PROGRESS NOTES
REASON FOR THE CONSULTATION:  Snoring   HISTORY OF PRESENT ILLNESS:    Thais Peterson is a 67y.o. year old male here for evaluation of snoring and hypersomnia. He is accompanied by his wife who has noted that patient at night is snoring, snorting, choking, having periods of not breathing, tossing and turning, decreased memory, decreased concentration,  falling asleep while reading, watching television, no increase in weight  , no sinus problems, no congested nose ,  denies feeling completely or partially paralyzed while falling asleep or waking up. LUNG CANCER SCREENING     1. CRITERIA MET    []     CT ORDERED  []      2. CRITERIA NOT MET   [x]      3. REFUSED                    []        REASON CRITERIA NOT MET     1. SMOKING LESS THAN 30 PY  []      2. AGE LESS THAN 55 or GREATER 77 YEARS  [x]      3. QUIT SMOKING 15 YEARS OR GREATER   []      4. RECENT CT WITH IN 11 MONTHS    []      5. LIFE EXPECTANCY < 5 YEARS   []      6.  SIGNS  AND SYMPTOMS OF LUNG CANCER   []         Immunization   Immunization History   Administered Date(s) Administered    Influenza Virus Vaccine 01/12/2014    Influenza, High Dose (Fluzone 65 yrs and older) 09/26/2017, 09/24/2018    Pneumococcal 13-valent Conjugate (Qiquong10) 07/14/2015    Pneumococcal Polysaccharide ([id number]) 01/16/2017    Td 08/20/2008    Zoster Live (Zostavax) 11/23/2009        Pneumococcal Vaccine     [x] Up to date    [] Indicated   [] Refused  [] Contraindicated       Influenza Vaccine   [x] Up to date    [] Indicated   [] Refused  [] Contraindicated          PAST MEDICAL HISTORY:       Diagnosis Date    Diverticulosis     Hyperlipidemia     Low HDL    Hypertension     Hyperuricemia     Obesity     Osteoarthrosis     not designated as generalized or localized    Pro[facility] cancer ([facility] [address] 2008    Third degree heart block ([facility] [address] 08/2017         Family History:   Family History   Problem Relation Age of Onset    Alzheimer's Disease Mother    Helen Hockey Emphysema Father     Other Father         vascular disease- of aneurysm       SURGICAL HISTORY:   Past Surgical History:   Procedure Laterality Date   [facility] Brand Notice; [facility]    COLONOSCOPY  2007    diverticulosis    FIBULA FRACTURE SURGERY Right 2000    [facility] HIP ARTHROPLASTY Bilateral ,    Dr Elbert Richards  2017    PROSTATE BIOPSY  07/10/2008    PROSTATECTOMY  2008    w/lymph node dissection Dr Feliberto Tejeda Right 10/2013    Dr Salas Greco,     [address]:      There  no history of TB or TB exposure. There  no asbestos or silica dust exposure. The patient reports  no coal, foundry, quarry or [employer] exposure. Travel history reveals no. There  no history of recreational or IV drug use. There  no hot tub exposure. Pets  no    Occupational history desk job , now retired now . TOBACCO:   reports that he quit smoking about 36 years ago. He has a 30.00 pack-year smoking history. He has never used smokeless tobacco.  ETOH:   reports that he does not drink alcohol. ALLERGIES:      Allergies   Allergen Reactions    No Known Allergies      Other reaction(s): Unknown Reaction    Pravastatin          Home Meds:   Prior to Admission medications    Medication Sig Start Date End Date Taking?  Authorizing Provider   MYRBETRIQ 25 MG TB24 take 1 tablet by mouth daily 18  Yes Emmanuel Trejo MD   vitamin B-12 (CYANOCOBALAMIN) 1000 MCG tablet Take 1,000 mcg by mouth daily   Yes Historical Provider, MD   vitamin D3 (CHOLECALCIFEROL) 400 units TABS tablet Take 400 Units by mouth daily   Yes Historical Provider, MD   Omega-3 Fatty Acids (FISH OIL) 1000 MG CAPS Take 3,000 mg by mouth 3 times daily   Yes Historical Provider, MD   donepezil (ARICEPT) 5 MG tablet Take 1 tablet by mouth nightly 18  Yes Emmanuel Trejo MD lisinopril-hydrochlorothiazide (PRINZIDE;ZESTORETIC) 10-12.5 MG per tablet take 1 tablet by mouth once daily 4/10/18  Yes Agustina Wang MD   ELIQUIS 5 MG TABS tablet Take 5 mg by mouth daily 12/22/17  Yes Historical Provider, MD   Coenzyme Q10 10 MG CAPS Take 1 capsule by mouth daily   Yes Historical Provider, MD   aspirin 81 MG tablet Take 81 mg by mouth daily   Yes Historical Provider, MD   Ascorbic Acid (VITAMIN C) 500 MG tablet Take 500 mg by mouth daily   Yes Historical Provider, MD   oxybutynin (DITROPAN) 5 MG tablet Take 5 mg by mouth daily Indications: Extended Release   Yes Historical Provider, MD   Glucosamine 750 MG TABS Take 1 capsule by mouth daily   Yes Historical Provider, MD   bisacodyl (BISACODYL) 5 MG EC tablet Take per bowel prep instructions 9/4/18   Rodrigo , DO   meloxicam [facility]) 15 MG tablet take 1 tablet by mouth once daily 3/5/18   Agustina Wang MD              REVIEW OF SYSTEMS:    CONSTITUTIONAL:  negative for  fevers, chills, sweats, fatigue, malaise, anorexia and weight loss  EYES:  negative for  double vision, blurred vision, dry eyes, eye discharge and redness  HEENT:  negative for  hearing loss, tinnitus, ear drainage, earaches and nasal congestion  RESPIRATORY:  No shortness of breath, no cough, no hemoptysis  CARDIOVASCULAR:  negative for  chest pain,, palpitations, orthopnea, PND  GASTROINTESTINAL:  negative for nausea, vomiting, change in bowel habits, diarrhea, constipation, abdominal pain, pruritus, abdominal mass and abdominal distention  GENITOURINARY:  negative for frequency, dysuria, nocturia, urinary incontinence and hesitancy  INTEGUMENT  negative for rash, skin lesion(s), dryness, skin color change, changes in lesion, pruritus and changes in hair  HEMATOLOGIC/LYMPHATIC:  negative for easy bruising, bleeding, lymphadenopathy, petechiae and swelling/edema  ALLERGIC/IMMUNOLOGIC:  negative for recurrent infections, urticaria and drug reactions  ENDOCRINE:

## 2018-09-25 NOTE — LETTER
[address] [place]  Phone: [phone]  Fax: [phone]    No ref. provider found        September 25, 2018       Patient: Martha Gilbert   MR Number: [id number]   YOB: 1945   Date of Visit: 9/25/2018       Dear Dr. Raphael Leyva    Thank you for the request for consultation for Martha Gilbert to me for the evaluation of Excessive daytime sleepiness. Below are the relevant portions of my assessment and plan of care. Patient has symptoms of obstructive sleep apnea and hypersomnia and with his underlying hypertension He will benefit from sleep study, both diagnostic and titration and treatment of obstructive sleep apnea. I will see him back in 4 months with testing and CPAP compliance  Discussed with patient and his wife in detail. If you have questions, please do not hesitate to call me. I look forward to following Sarah Menendez along with you. Sincerely,        Adrianna Casillas MD    CC providers:  Leon Norris MD  [address]

## 2018-11-12 ENCOUNTER — OFFICE VISIT (OUTPATIENT)
Dept: FAMILY MEDICINE CLINIC | Age: 73
End: 2018-11-12
Payer: MEDICARE

## 2018-11-12 VITALS
HEART RATE: 80 BPM | SYSTOLIC BLOOD PRESSURE: 110 MMHG | WEIGHT: 241.3 LBS | HEIGHT: 71 IN | DIASTOLIC BLOOD PRESSURE: 68 MMHG | BODY MASS INDEX: 33.78 KG/M2 | OXYGEN SATURATION: 93 %

## 2018-11-12 DIAGNOSIS — M15.9 PRIMARY OSTEOARTHRITIS INVOLVING MULTIPLE JOINTS: ICD-10-CM

## 2018-11-12 DIAGNOSIS — I48.0 PAROXYSMAL ATRIAL FIBRILLATION (HCC): ICD-10-CM

## 2018-11-12 DIAGNOSIS — G47.10 EXCESSIVE SOMNOLENCE DISORDER: ICD-10-CM

## 2018-11-12 DIAGNOSIS — I49.5 SICK SINUS SYNDROME (HCC): ICD-10-CM

## 2018-11-12 DIAGNOSIS — E78.2 MIXED HYPERLIPIDEMIA: ICD-10-CM

## 2018-11-12 DIAGNOSIS — I10 ESSENTIAL HYPERTENSION: Primary | ICD-10-CM

## 2018-11-12 DIAGNOSIS — Z12.5 SCREENING PSA (PROSTATE SPECIFIC ANTIGEN): ICD-10-CM

## 2018-11-12 DIAGNOSIS — C61 MALIGNANT NEOPLASM OF PROSTATE (HCC): ICD-10-CM

## 2018-11-12 DIAGNOSIS — G31.84 MILD COGNITIVE IMPAIRMENT WITH MEMORY LOSS: ICD-10-CM

## 2018-11-12 PROCEDURE — 99214 OFFICE O/P EST MOD 30 MIN: CPT | Performed by: FAMILY MEDICINE

## 2018-11-12 PROCEDURE — G8427 DOCREV CUR MEDS BY ELIG CLIN: HCPCS | Performed by: FAMILY MEDICINE

## 2018-11-12 PROCEDURE — 3017F COLORECTAL CA SCREEN DOC REV: CPT | Performed by: FAMILY MEDICINE

## 2018-11-12 PROCEDURE — 1123F ACP DISCUSS/DSCN MKR DOCD: CPT | Performed by: FAMILY MEDICINE

## 2018-11-12 PROCEDURE — 1036F TOBACCO NON-USER: CPT | Performed by: FAMILY MEDICINE

## 2018-11-12 PROCEDURE — 4040F PNEUMOC VAC/ADMIN/RCVD: CPT | Performed by: FAMILY MEDICINE

## 2018-11-12 PROCEDURE — 1101F PT FALLS ASSESS-DOCD LE1/YR: CPT | Performed by: FAMILY MEDICINE

## 2018-11-12 PROCEDURE — G8417 CALC BMI ABV UP PARAM F/U: HCPCS | Performed by: FAMILY MEDICINE

## 2018-11-12 PROCEDURE — G8482 FLU IMMUNIZE ORDER/ADMIN: HCPCS | Performed by: FAMILY MEDICINE

## 2018-11-16 ASSESSMENT — ENCOUNTER SYMPTOMS
BACK PAIN: 0
CHEST TIGHTNESS: 0
WHEEZING: 0
SHORTNESS OF BREATH: 0
BLOOD IN STOOL: 0
APNEA: 0
ABDOMINAL PAIN: 0
CHOKING: 0
ABDOMINAL DISTENTION: 0

## 2018-11-26 ENCOUNTER — HOSPITAL ENCOUNTER (OUTPATIENT)
Dept: SLEEP CENTER | Age: 73
Discharge: HOME | End: 2018-11-28
Payer: MEDICARE

## 2018-11-26 DIAGNOSIS — G47.30 SLEEP-RELATED BREATHING DISORDER: ICD-10-CM

## 2018-11-26 DIAGNOSIS — G47.10 HYPERSOMNIA: ICD-10-CM

## 2018-11-26 PROCEDURE — 95810 POLYSOM 6/> YRS 4/> PARAM: CPT

## 2018-11-28 ENCOUNTER — HOSPITAL ENCOUNTER (OUTPATIENT)
Dept: SLEEP CENTER | Age: 73
Discharge: HOME | End: 2018-11-30
Payer: MEDICARE

## 2018-11-28 DIAGNOSIS — G47.10 HYPERSOMNIA: ICD-10-CM

## 2018-11-28 DIAGNOSIS — G47.30 SLEEP-RELATED BREATHING DISORDER: ICD-10-CM

## 2018-11-28 PROCEDURE — 95811 POLYSOM 6/>YRS CPAP 4/> PARM: CPT

## 2018-12-03 NOTE — PROGRESS NOTES
[address]                 [address] [place] [address]                                  SLEEP STUDY  PATIENT NAME: Josue He                       :        1945  MED REC NO:   [id number]                             ROOM:  ACCOUNT NO:   [de-identified]                           ADMIT DATE: 2018  PROVIDER:     She Gonzalez    SLEEP IMPROVEMENT [facility]  INTERPRETATION OF CLINICAL POLYSOMNOGRAPHY    DATE OF STUDY:  2018    REFERRING PROVIDER:  Andrew Glover MD    CLINICAL IMPRESSION:  1. Obstructive sleep apnea syndrome. 2.  Hypertension. 3.  Hypersomnia. PROCEDURE STANDARD:  It was a 1-night study. PROCEDURE:  The sleep/wake evaluation consisted of an intensive intake  interview, one night of clinical polysomnography, a nocturnal  respiratory battery, left and right leg anterior tibialis surface  electromyography. The standard montage for clinical polysomnography included the  electroencephalogram, the electro-oculogram, mentalis surface  electromyography, and modified Lead II cardiography. The respiratory  battery consisted of measurements of oral/nasal airflow by heat  thermistor, nasal pressure transducer, thoracic and abdominal effort by  Respiratory Inductance Plethysmography. Nocturnal oxyhemoglobin  saturations were obtained by finger oximetry. Polysomnography revealed that the patient spent 467 minutes in bed with  a total sleep time of 265 minutes. Sleep efficiency was reduced to 57%. Latency of sleep onset was normal at 9 minutes. There were 355 sleep  stage changes. There were 143 awakenings during the night. Sleep architecture was abnormal with 82% stage I, 11% stage II, 0 delta,  and 7% REM. Latency of various sleep stages were normal other than prolonged latency  of REM, which was 215 minutes. Polysomnography did not reveal any other abnormality.     Electrocardiogram did not reveal any

## 2018-12-31 RX ORDER — DONEPEZIL HYDROCHLORIDE 5 MG/1
TABLET, FILM COATED ORAL
Qty: 30 TABLET | Refills: 3 | Status: SHIPPED | OUTPATIENT
Start: 2018-12-31 | End: 2019-04-03 | Stop reason: SDUPTHER

## 2019-02-26 ENCOUNTER — OFFICE VISIT (OUTPATIENT)
Dept: PULMONOLOGY | Age: 74
End: 2019-02-26
Payer: MEDICARE

## 2019-02-26 VITALS
HEART RATE: 58 BPM | BODY MASS INDEX: 33.21 KG/M2 | RESPIRATION RATE: 14 BRPM | WEIGHT: 237.2 LBS | HEIGHT: 71 IN | SYSTOLIC BLOOD PRESSURE: 120 MMHG | DIASTOLIC BLOOD PRESSURE: 60 MMHG | OXYGEN SATURATION: 99 %

## 2019-02-26 DIAGNOSIS — I10 ESSENTIAL HYPERTENSION: ICD-10-CM

## 2019-02-26 DIAGNOSIS — G47.10 HYPERSOMNIA: ICD-10-CM

## 2019-02-26 DIAGNOSIS — G47.33 SEVERE OBSTRUCTIVE SLEEP APNEA: Primary | ICD-10-CM

## 2019-02-26 PROCEDURE — G8427 DOCREV CUR MEDS BY ELIG CLIN: HCPCS | Performed by: INTERNAL MEDICINE

## 2019-02-26 PROCEDURE — 1036F TOBACCO NON-USER: CPT | Performed by: INTERNAL MEDICINE

## 2019-02-26 PROCEDURE — 3017F COLORECTAL CA SCREEN DOC REV: CPT | Performed by: INTERNAL MEDICINE

## 2019-02-26 PROCEDURE — 4040F PNEUMOC VAC/ADMIN/RCVD: CPT | Performed by: INTERNAL MEDICINE

## 2019-02-26 PROCEDURE — 1101F PT FALLS ASSESS-DOCD LE1/YR: CPT | Performed by: INTERNAL MEDICINE

## 2019-02-26 PROCEDURE — 1123F ACP DISCUSS/DSCN MKR DOCD: CPT | Performed by: INTERNAL MEDICINE

## 2019-02-26 PROCEDURE — G8482 FLU IMMUNIZE ORDER/ADMIN: HCPCS | Performed by: INTERNAL MEDICINE

## 2019-02-26 PROCEDURE — 99214 OFFICE O/P EST MOD 30 MIN: CPT | Performed by: INTERNAL MEDICINE

## 2019-02-26 PROCEDURE — G8417 CALC BMI ABV UP PARAM F/U: HCPCS | Performed by: INTERNAL MEDICINE

## 2019-03-04 DIAGNOSIS — R32 URINARY INCONTINENCE, UNSPECIFIED TYPE: ICD-10-CM

## 2019-03-04 RX ORDER — MIRABEGRON 25 MG/1
TABLET, FILM COATED, EXTENDED RELEASE ORAL
Qty: 30 TABLET | Refills: 5 | Status: SHIPPED | OUTPATIENT
Start: 2019-03-04 | End: 2019-08-07 | Stop reason: SDUPTHER

## 2019-04-03 DIAGNOSIS — I10 ESSENTIAL HYPERTENSION: ICD-10-CM

## 2019-04-03 NOTE — TELEPHONE ENCOUNTER
Harish Wetzel is calling to request a refill on the following medication(s):  Requested Prescriptions     Pending Prescriptions Disp Refills    donepezil (ARICEPT) 5 MG tablet [Pharmacy Med Name: DONEPEZIL HCL 5 MG TABLET] 30 tablet 3     Sig: take 1 tablet by mouth every evening    lisinopril-hydrochlorothiazide (PRINZIDE;ZESTORETIC) 10-12.5 MG per tablet [Pharmacy Med Name: LISINOPRIL-HCTZ 10-12.5 MG TAB] 30 tablet 11     Sig: take 1 tablet by mouth once daily       Last Visit Date (If Applicable):  97/83/8881    Next Visit Date:    5/13/2019

## 2019-04-04 RX ORDER — LISINOPRIL AND HYDROCHLOROTHIAZIDE 12.5; 1 MG/1; MG/1
TABLET ORAL
Qty: 30 TABLET | Refills: 11 | Status: SHIPPED | OUTPATIENT
Start: 2019-04-04 | End: 2019-05-13

## 2019-04-04 RX ORDER — DONEPEZIL HYDROCHLORIDE 5 MG/1
TABLET, FILM COATED ORAL
Qty: 30 TABLET | Refills: 3 | Status: SHIPPED | OUTPATIENT
Start: 2019-04-04 | End: 2019-08-02 | Stop reason: SDUPTHER

## 2019-04-23 ENCOUNTER — OFFICE VISIT (OUTPATIENT)
Dept: PULMONOLOGY | Age: 74
End: 2019-04-23
Payer: MEDICARE

## 2019-04-23 VITALS
SYSTOLIC BLOOD PRESSURE: 110 MMHG | HEIGHT: 71 IN | HEART RATE: 60 BPM | RESPIRATION RATE: 16 BRPM | BODY MASS INDEX: 33.04 KG/M2 | OXYGEN SATURATION: 97 % | WEIGHT: 236 LBS | DIASTOLIC BLOOD PRESSURE: 70 MMHG

## 2019-04-23 DIAGNOSIS — G47.33 OSA ON CPAP: Primary | ICD-10-CM

## 2019-04-23 DIAGNOSIS — Z99.89 OSA ON CPAP: Primary | ICD-10-CM

## 2019-04-23 PROCEDURE — 99213 OFFICE O/P EST LOW 20 MIN: CPT | Performed by: NURSE PRACTITIONER

## 2019-04-23 PROCEDURE — 1036F TOBACCO NON-USER: CPT | Performed by: NURSE PRACTITIONER

## 2019-04-23 PROCEDURE — 1123F ACP DISCUSS/DSCN MKR DOCD: CPT | Performed by: NURSE PRACTITIONER

## 2019-04-23 PROCEDURE — G8417 CALC BMI ABV UP PARAM F/U: HCPCS | Performed by: NURSE PRACTITIONER

## 2019-04-23 PROCEDURE — G8427 DOCREV CUR MEDS BY ELIG CLIN: HCPCS | Performed by: NURSE PRACTITIONER

## 2019-04-23 PROCEDURE — 4040F PNEUMOC VAC/ADMIN/RCVD: CPT | Performed by: NURSE PRACTITIONER

## 2019-04-23 PROCEDURE — 3017F COLORECTAL CA SCREEN DOC REV: CPT | Performed by: NURSE PRACTITIONER

## 2019-04-23 NOTE — PROGRESS NOTES
PULMONARY OP  PROGRESS NOTE      Patient:  Saúl Huffman  YOB: 1945    MRN: [id number]     Acct:        Pt seen and Chart reviewed. Mrs. Saúl Huffman is here in followup for    Diagnosis Orders   1. SHONDA on CPAP         Pt is here for f/u CPAP compliance and response; he initiated therapy 3/12/19. Compliance data was reviewed - pt was 100% compliant for an average of 8 h 29 m a night; AHI 14. Pt reports restful, restorative sleep and believes he is getting great benefit of CPaP therapy. He denies snoring, frequent night waking or excessive daytime fatigue. Wife states she noticed immediate improvement in daytime energy and alertness. Pt states he leaves mask \"loose\" on face and it frequently falls off while he repositions himself at night. This likely attributes to the elevated AHI. Recommendations to tighten mask and/or try small size/new mask provided. Subjective:   Review of Systems -   Constitutional ROS: negative  ENT ROS: negative  Allergy and Immunology ROS: negative  Respiratory ROS: apnea   Cardiovascular ROS: negative  Gastrointestinal ROS: negative  Musculoskeletal ROS: negative    Allergies:   Allergies   Allergen Reactions    No Known Allergies      Other reaction(s): Unknown Reaction    Pravastatin        Medications:    Current Outpatient Medications:     CPAP Machine MISC, by Does not apply route Pressure of 13, Disp: , Rfl:     donepezil (ARICEPT) 5 MG tablet, take 1 tablet by mouth every evening, Disp: 30 tablet, Rfl: 3    lisinopril-hydrochlorothiazide (PRINZIDE;ZESTORETIC) 10-12.5 MG per tablet, take 1 tablet by mouth once daily, Disp: 30 tablet, Rfl: 11    MYRBETRIQ 25 MG TB24, TAKE 1 TABLET BY MOUTH ONCE DAILY, Disp: 30 tablet, Rfl: 5    vitamin B-12 (CYANOCOBALAMIN) 1000 MCG tablet, Take 1,000 mcg by mouth daily, Disp: , Rfl:     vitamin D3 (CHOLECALCIFEROL) 400 units TABS tablet, Take 400 Units by mouth daily, Disp: , Rfl:     Omega-3 Fatty Acids (FISH OIL) 1000 MG CAPS, Take 3,000 mg by mouth 3 times daily, Disp: , Rfl:     meloxicam (MOBIC) 15 MG tablet, take 1 tablet by mouth once daily, Disp: 30 tablet, Rfl: 5    ELIQUIS 5 MG TABS tablet, Take 5 mg by mouth daily, Disp: , Rfl: 0    Coenzyme Q10 10 MG CAPS, Take 1 capsule by mouth daily, Disp: , Rfl:     Ascorbic Acid (VITAMIN C) 500 MG tablet, Take 500 mg by mouth daily, Disp: , Rfl:     Glucosamine 750 MG TABS, Take 1 capsule by mouth daily, Disp: , Rfl:       Objective:    Physical Exam:  Vitals: /70 (Site: Left Upper Arm, Position: Sitting, Cuff Size: Medium Adult)   Pulse 60   Resp 16   Ht 5' 11\" (1.803 m)   Wt 236 lb (107 kg)   SpO2 97% Comment: room air  BMI 32.92 kg/m²   Last 3 weights: Wt Readings from Last 3 Encounters:   04/23/19 236 lb (107 kg)   02/26/19 237 lb 3.2 oz (107.6 kg)   11/12/18 241 lb 4.8 oz (109.5 kg)     Body mass index is 32.92 kg/m². Physical Examination:   Constitutional: Appears well, no distress  EENT: large tongue with low hanging soft palate and overall decreased oropharynx; PERRLA,  sclera clear, anicteric, oropharynx clear, no lesions, neck supple with midline trachea. Neck: Supple, symmetrical, trachea midline, no adenopathy, no goiter, no jvd skin normal  Respiratory: clear to auscultation, no wheezes or rales and unlabored breathing. No intercostal tenderness  Cardiovascular: regular rate and rhythm, normal S1, S2, no murmur noted  Abdomen: soft, nontender, nondistended, no masses or organomegaly  Extremities:  no pedal edema, no clubbing or cyanosi      Assessment:     Diagnosis Orders   1. SHONDA on CPAP           PLAN:    CPAP to be used for at least 4 hours every night. Use humidifier if needed. Weight loss was recommended and discussed. Recommended following good sleep hygiene instructions. Given sleep hygiene instructions to the patient. Explained importance of compliance with treatment of sleep apnea.   Compliance data was reviewed and the patient is in compliance per insurance requirement.     We'll see the patient back in 6 months      Troy Gutierrez CNP             4/23/2019, 2:38 PM

## 2019-05-13 ENCOUNTER — OFFICE VISIT (OUTPATIENT)
Dept: FAMILY MEDICINE CLINIC | Age: 74
End: 2019-05-13
Payer: MEDICARE

## 2019-05-13 VITALS
DIASTOLIC BLOOD PRESSURE: 60 MMHG | SYSTOLIC BLOOD PRESSURE: 102 MMHG | OXYGEN SATURATION: 98 % | WEIGHT: 238.6 LBS | HEART RATE: 54 BPM | BODY MASS INDEX: 33.28 KG/M2

## 2019-05-13 DIAGNOSIS — I44.2 THIRD DEGREE HEART BLOCK (HCC): ICD-10-CM

## 2019-05-13 DIAGNOSIS — I49.5 SICK SINUS SYNDROME (HCC): ICD-10-CM

## 2019-05-13 DIAGNOSIS — G31.84 MILD COGNITIVE IMPAIRMENT WITH MEMORY LOSS: ICD-10-CM

## 2019-05-13 DIAGNOSIS — I10 ESSENTIAL HYPERTENSION: Primary | ICD-10-CM

## 2019-05-13 DIAGNOSIS — I48.0 PAROXYSMAL ATRIAL FIBRILLATION (HCC): ICD-10-CM

## 2019-05-13 DIAGNOSIS — C61 MALIGNANT NEOPLASM OF PROSTATE (HCC): ICD-10-CM

## 2019-05-13 DIAGNOSIS — G47.33 OSA TREATED WITH BIPAP: ICD-10-CM

## 2019-05-13 DIAGNOSIS — E78.2 MIXED HYPERLIPIDEMIA: ICD-10-CM

## 2019-05-13 LAB
A/G RATIO: 1.5 RATIO
AGE FOR GFR: 73
ALBUMIN: 4.2 G/DL (ref 3.5–5)
ALK PHOSPHATASE: 55 UNITS/L (ref 38–126)
ALT SERPL-CCNC: 22 UNITS/L (ref 21–72)
ANION GAP SERPL CALCULATED.3IONS-SCNC: 8 MMOL/L
AST SERPL-CCNC: 23 UNITS/L (ref 17–59)
BASOPHILS # BLD: 0.07 THOU/MM3 (ref 0–0.3)
BILIRUB SERPL-MCNC: 0.5 MG/DL (ref 0.2–1.3)
BUN BLDV-MCNC: 15 MG/DL (ref 9–20)
CALCIUM SERPL-MCNC: 9.6 MG/DL (ref 8.4–10.2)
CHLORIDE BLD-SCNC: 101 MMOL/L (ref 98–120)
CO2: 32 MMOL/L (ref 22–31)
CREAT SERPL-MCNC: 1 MG/DL (ref 0.7–1.3)
DIFFERENTIAL: AUTOMATED DIFF
EGFR BF: 66 ML/MIN/1.73 M2
EGFR BM: 89 ML/MIN/1.73 M2
EGFR WF: 54 ML/MIN/1.73 M2
EGFR WM: 73 ML/MIN/1.73 M2
EOSINOPHIL # BLD: 0.19 THOU/MM3 (ref 0–1.1)
GLOBULIN: 2.8 G/DL
GLUCOSE: 83 MG/DL (ref 75–110)
HCT VFR BLD CALC: 40.7 % (ref 42–52)
HEMOGLOBIN: 13.2 G/DL (ref 13.8–17)
LYMPHOCYTES # BLD: 1.2 THOU/MM3 (ref 1–5.5)
MCH RBC QN AUTO: 28.6 PG (ref 28.5–32)
MCHC RBC AUTO-ENTMCNC: 32.6 G/DL (ref 32–37)
MCV RBC AUTO: 87.7 FL (ref 80–94)
MONOCYTES # BLD: 0.54 THOU/MM3 (ref 0.1–1)
NEUTROPHILS: 4.33 THOU/MM3 (ref 2–8.1)
PDW BLD-RTO: 11.8 % (ref 10–15)
PLATELET # BLD: 200 THOU/MM3 (ref 130–400)
PMV BLD AUTO: 9.8 FL (ref 7.4–11)
POTASSIUM SERPL-SCNC: 4.3 MMOL/L (ref 3.6–5)
RBC # BLD: 4.64 M/UL (ref 4.7–6.1)
SCREENING PSA: <0.06 NG/ML (ref 0–4)
SODIUM BLD-SCNC: 137 MMOL/L (ref 135–145)
TOTAL PROTEIN: 7 G/DL (ref 6.3–8.2)
WBC # BLD: 6.33 THOU/ML3 (ref 4.8–10)

## 2019-05-13 PROCEDURE — 3017F COLORECTAL CA SCREEN DOC REV: CPT | Performed by: FAMILY MEDICINE

## 2019-05-13 PROCEDURE — 4040F PNEUMOC VAC/ADMIN/RCVD: CPT | Performed by: FAMILY MEDICINE

## 2019-05-13 PROCEDURE — 1036F TOBACCO NON-USER: CPT | Performed by: FAMILY MEDICINE

## 2019-05-13 PROCEDURE — G8417 CALC BMI ABV UP PARAM F/U: HCPCS | Performed by: FAMILY MEDICINE

## 2019-05-13 PROCEDURE — G8427 DOCREV CUR MEDS BY ELIG CLIN: HCPCS | Performed by: FAMILY MEDICINE

## 2019-05-13 PROCEDURE — 1123F ACP DISCUSS/DSCN MKR DOCD: CPT | Performed by: FAMILY MEDICINE

## 2019-05-13 PROCEDURE — 99214 OFFICE O/P EST MOD 30 MIN: CPT | Performed by: FAMILY MEDICINE

## 2019-05-13 RX ORDER — LISINOPRIL AND HYDROCHLOROTHIAZIDE 12.5; 1 MG/1; MG/1
0.5 TABLET ORAL DAILY
Qty: 30 TABLET | Refills: 11
Start: 2019-05-13 | End: 2019-11-08

## 2019-05-13 ASSESSMENT — ENCOUNTER SYMPTOMS
ABDOMINAL PAIN: 0
ABDOMINAL DISTENTION: 0
BLOOD IN STOOL: 0
CHEST TIGHTNESS: 0
SHORTNESS OF BREATH: 0
WHEEZING: 0
BACK PAIN: 0
CHOKING: 0

## 2019-05-13 ASSESSMENT — PATIENT HEALTH QUESTIONNAIRE - PHQ9
1. LITTLE INTEREST OR PLEASURE IN DOING THINGS: 1
SUM OF ALL RESPONSES TO PHQ9 QUESTIONS 1 & 2: 2
2. FEELING DOWN, DEPRESSED OR HOPELESS: 1
SUM OF ALL RESPONSES TO PHQ QUESTIONS 1-9: 2
SUM OF ALL RESPONSES TO PHQ QUESTIONS 1-9: 2

## 2019-05-13 NOTE — PROGRESS NOTES
[facility]  [address] [facility], [address] [place] [address]  Dept: [phone]  DeptFax: [phone]    Fanny Portillo is a75 y.o. male who presents today for his medical conditions/complaints as noted below. Fanny Portillo is c/o of 6 Month Follow-Up; Hypertension (Denies leg swelling, palpatations ); and Hyperlipidemia      HPI:     HPI   Patient is here for routine 6 month checkup    Hypertension  Blood pressure is low for us today; 100/52 and 98/50   He voices no complaints of lightheadedness or dizziness. He does not usually check his blood pressures. He is on lisinopril-hydrochlorothiazide alone. He has no chest pain or chest pressure. No increasing shortness of breath although one wonders about his activity level. Denies dizziness or fatigue; when he gets up quickly he may upon occasion get lightheaded      Hyperlipidemia  The patient remains on no medications.    The 10-year ASCVD risk score (Crystal Akers, et al., 2013) is: 17.2%    Values used to calculate the score:      Age: 68 years      Sex: Male      Is Non- : No      Diabetic: No      Tobacco smoker: No      Systolic Blood Pressure: 468 mmHg      Is BP treated: Yes      HDL Cholesterol: 51 mg/dL      Total Cholesterol: 192 mg/dL   Based on lipid panel ; last checked 3/2019; 116 LDL     Sick sinus syndrome / atrial fibrillation   The patient is under the care of Dr. Radha Tran at [facility]. Wife thinks he will be going back in June  He is taking Eliquis 5 mg daily. He does have a pacemaker placed. He sees her every 6 monthse; did have an echocardiogram in March of 2018 which showed mild-to-moderate tricuspid regurg, otherwise normal left ventricular function. wondering if they might see cardiology here instead of traveling to [place] (she is at [address] now )     Mild cognitive impairment  He is on Aricept. No side effects but no clear benefit.   Sometimes his wife thinks he is better, sometimes she thinks he is unchanged. The patient, himself, denies any symptoms. Denies any side effects     Prostatic CA  He is no longer under the care of Urology as his prostatectomy was back in 2008 with Dr. Mindi Carlin. He has no symptoms. No frequency. No hematuria. No pain. We are treating him for urgency with Myrbetriq. He was intolerant to oxybutynin. His PSA has always been less than 0.06 on our checks. Turns out he did see Dr Mindi Carlin in April for urge incontinence and patient reports having had a cystoscopy ; no report seen; they just went back on his own - stating that I kept asking them if he had seen him; Apparently the cystoscopy was normal and he remains on myrbetriq     Obstructive sleep apnea   Diagnosed at the end of 2018  He was seen by Dr. Sang Julian. ;April 2019; is noted here below  Pt is here for f/u CPAP compliance and response; he initiated therapy 3/12/19. Compliance data was reviewed - pt was 100% compliant for an average of 8 h 29 m a night; AHI 14. Pt reports restful, restorative sleep and believes he is getting great benefit of CPaP therapy. He denies snoring, frequent night waking or excessive daytime fatigue. Wife states she noticed immediate improvement in daytime energy and alertness. Pt states he leaves mask \"loose\" on face and it frequently falls off while he repositions himself at night. This likely attributes to the elevated AHI. Recommendations to tighten mask and/or try small size/new mask provided.           BP Readings from Last 3 Encounters:   05/13/19 102/60   04/23/19 110/70   02/26/19 120/60            (goal 120/80)    Past Medical History:   Diagnosis Date    Diverticulosis     Hyperlipidemia     Low HDL    Hypertension     Hyperuricemia     Obesity     Osteoarthrosis     not designated as generalized or localized    Prostate cancer ([address] 2008    Third degree heart block ([address] 08/2017      Past Surgical History:   Procedure Laterality Date    A-V CARDIAC PACEMAKER Reactions    No Known Allergies      Other reaction(s): Unknown Reaction    Pravastatin        Health Maintenance   Topic Date Due    DTaP/Tdap/Td vaccine (1 - Tdap) 08/21/2008    Shingles Vaccine (2 of 3) 01/18/2010    Colon cancer screen colonoscopy  01/11/2017    Potassium monitoring  10/03/2019    Creatinine monitoring  10/03/2019    Lipid screen  03/14/2023    Flu vaccine  Completed    Pneumococcal 65+ years Vaccine  Completed    AAA screen  Completed    Hepatitis C screen  Completed       Lab Results   Component Value Date    K 4.3 05/13/2019    CREATININE 1.0 05/13/2019    AST 23 05/13/2019    ALT 22 05/13/2019    HCT 40.7 05/13/2019    LABA1C 5.4 12/28/2012    GLUCOSE 83 05/13/2019    CALCIUM 9.6 05/13/2019      Lab Results   Component Value Date    CHOL 192 03/14/2018    CHOL 166 07/11/2017    TRIG 123 03/14/2018    HDL 48 07/11/2017       Subjective:      Review of Systems   Constitutional: Positive for activity change (for the better). Negative for chills, diaphoresis, fatigue and fever. HENT: Negative. Respiratory: Negative for choking, chest tightness, shortness of breath and wheezing. Cardiovascular: Negative for chest pain, palpitations and leg swelling. Gastrointestinal: Negative for abdominal distention, abdominal pain and blood in stool. Genitourinary: Negative for difficulty urinating, dysuria, frequency, hematuria and urgency. Musculoskeletal: Positive for gait problem (a little unsteady). Negative for back pain. Neurological: Negative for dizziness. Hematological: Negative for adenopathy. Psychiatric/Behavioral: Negative for dysphoric mood. Objective:     /60 (Site: Left Upper Arm, Position: Sitting, Cuff Size: Medium Adult)   Pulse 54   Wt 238 lb 9.6 oz (108.2 kg)   SpO2 98%   BMI 33.28 kg/m²     Physical Exam   Constitutional: He appears well-developed and well-nourished. No distress.    HENT:   Right Ear: Tympanic membrane normal.   Left Ear: Tympanic membrane normal.   Mouth/Throat: Oropharynx is clear and moist and mucous membranes are normal.   consticted oropharynx    Neck: Neck supple. Carotid bruit is not present. No thyromegaly present. Cardiovascular: Regular rhythm, S1 normal and S2 normal.   No murmur heard. Pulmonary/Chest: Breath sounds normal. He has no wheezes. He has no rhonchi. He has no rales. Abdominal: Soft. Bowel sounds are normal.   Musculoskeletal: He exhibits no edema (of lower extremities). Lymphadenopathy:     He has no cervical adenopathy. He has no axillary adenopathy. Neurological:   Unsteady gait    Skin: No rash noted. Vitals reviewed. Assessment:      Diagnosis Orders   1. Essential hypertension  lisinopril-hydrochlorothiazide (PRINZIDE;ZESTORETIC) 10-12.5 MG per tablet   2. Paroxysmal atrial fibrillation (HCC)     3. Sick sinus syndrome ([address]     4. Third degree heart block ([address]     5. Mild cognitive impairment with memory loss     6. Malignant neoplasm of prostate ([address]     7. Mixed hyperlipidemia     8. SHONDA treated with BiPAP              POC Testing Results (If Applicable):  No results found for this visit on 05/13/19. Plan:   Decrease lisinopril to half a tablet a day. We'll repeat his CBC and CMP to be done today. Continue his current medications. Recheck in 6 months. At which time they might consider his establishing with cardiology here. They will keep their June appointment with Dr. Ann Dee return sooner if any problems      Orders Given:  Orders Placed This Encounter   Procedures    CBC Auto Differential    Comprehensive Metabolic Panel    Psa screening     Prescriptions:    Orders Placed This Encounter   Medications    lisinopril-hydrochlorothiazide (PRINZIDE;ZESTORETIC) 10-12.5 MG per tablet     Sig: Take 0.5 tablets by mouth daily     Dispense:  30 tablet     Refill:  11        Return in about 6 months (around 11/13/2019).       Electronically signed by Taylor Kimble MD on5/13/2019. **This report has been created using voice recognition software. It may contain minor errors which are inherent in voice recognition technology. **

## 2019-05-21 DIAGNOSIS — M17.10 PRIMARY LOCALIZED OSTEOARTHROSIS OF LOWER LEG, UNSPECIFIED LATERALITY: ICD-10-CM

## 2019-05-21 NOTE — TELEPHONE ENCOUNTER
States we were in the other day, and told Loc Aiken is supposed to be taking meloxicam instead of oxybutinin, the pharmacy said that they do not have a script for the meloxicam.         Emy Reed is calling to request a refill on the following medication(s):  Requested Prescriptions     Pending Prescriptions Disp Refills    meloxicam (MOBIC) 15 MG tablet 30 tablet 5     Sig: take 1 tablet by mouth once daily       Last Visit Date (If Applicable):  8/29/3642    Next Visit Date:    11/15/2019

## 2019-05-22 RX ORDER — MELOXICAM 15 MG/1
TABLET ORAL
Qty: 30 TABLET | Refills: 1 | Status: SHIPPED | OUTPATIENT
Start: 2019-05-22 | End: 2019-10-30 | Stop reason: ALTCHOICE

## 2019-05-22 NOTE — TELEPHONE ENCOUNTER
Please call Sang Benjamin; I will refill the medication but he should only use it as needed ; with the eliquis it increases his risk for bleeding

## 2019-05-31 ENCOUNTER — TELEPHONE (OUTPATIENT)
Dept: FAMILY MEDICINE CLINIC | Age: 74
End: 2019-05-31

## 2019-06-10 ENCOUNTER — OFFICE VISIT (OUTPATIENT)
Dept: FAMILY MEDICINE CLINIC | Age: 74
End: 2019-06-10
Payer: MEDICARE

## 2019-06-10 VITALS
BODY MASS INDEX: 32.78 KG/M2 | RESPIRATION RATE: 14 BRPM | WEIGHT: 235 LBS | HEART RATE: 60 BPM | SYSTOLIC BLOOD PRESSURE: 110 MMHG | DIASTOLIC BLOOD PRESSURE: 60 MMHG | OXYGEN SATURATION: 97 %

## 2019-06-10 DIAGNOSIS — I44.2 THIRD DEGREE HEART BLOCK (HCC): ICD-10-CM

## 2019-06-10 DIAGNOSIS — G31.84 MILD COGNITIVE IMPAIRMENT WITH MEMORY LOSS: ICD-10-CM

## 2019-06-10 DIAGNOSIS — Z01.818 PREOP EXAMINATION: Primary | ICD-10-CM

## 2019-06-10 DIAGNOSIS — I48.0 PAROXYSMAL ATRIAL FIBRILLATION (HCC): ICD-10-CM

## 2019-06-10 DIAGNOSIS — C61 MALIGNANT NEOPLASM OF PROSTATE (HCC): ICD-10-CM

## 2019-06-10 DIAGNOSIS — I10 ESSENTIAL HYPERTENSION: ICD-10-CM

## 2019-06-10 DIAGNOSIS — I49.5 SICK SINUS SYNDROME (HCC): ICD-10-CM

## 2019-06-10 DIAGNOSIS — E78.2 MIXED HYPERLIPIDEMIA: ICD-10-CM

## 2019-06-10 PROCEDURE — 1036F TOBACCO NON-USER: CPT | Performed by: FAMILY MEDICINE

## 2019-06-10 PROCEDURE — 1123F ACP DISCUSS/DSCN MKR DOCD: CPT | Performed by: FAMILY MEDICINE

## 2019-06-10 PROCEDURE — 3017F COLORECTAL CA SCREEN DOC REV: CPT | Performed by: FAMILY MEDICINE

## 2019-06-10 PROCEDURE — 4040F PNEUMOC VAC/ADMIN/RCVD: CPT | Performed by: FAMILY MEDICINE

## 2019-06-10 PROCEDURE — 99214 OFFICE O/P EST MOD 30 MIN: CPT | Performed by: FAMILY MEDICINE

## 2019-06-10 PROCEDURE — G8417 CALC BMI ABV UP PARAM F/U: HCPCS | Performed by: FAMILY MEDICINE

## 2019-06-10 PROCEDURE — G8427 DOCREV CUR MEDS BY ELIG CLIN: HCPCS | Performed by: FAMILY MEDICINE

## 2019-06-10 ASSESSMENT — ENCOUNTER SYMPTOMS
CHEST TIGHTNESS: 0
ABDOMINAL DISTENTION: 0
BACK PAIN: 0
CHOKING: 0
SHORTNESS OF BREATH: 0
BLOOD IN STOOL: 0
ABDOMINAL PAIN: 0
WHEEZING: 0

## 2019-06-10 NOTE — PROGRESS NOTES
[address] [address] [address]  Dept: [phone]  DeptFax: [phone]    Kirt Rubinstein is a75 y.o. male who presents today for his medical conditions/complaints as noted below. Kirt Rubinstein is c/o of Pre-op Exam (Dr. Hunter Ron 6/26/19 OR scheduled. Botox for bladder. Cleared from Cardiology. Clearance in media.)      HPI:     HPI     Patient is here for request for preop clearance for Botox injection for his bladder. With Dr. Hunter Ron. Scheduled 626. His problems include       Hypertension  Blood pressure is  110/60.   He voices no complaints of lightheadedness or dizziness. He does not usually check his blood pressures.  He is on lisinopril-hydrochlorothiazide alone. Papa White has no chest pain or chest pressure.  No increasing shortness of breath although one wonders about his activity level. Denies dizziness or fatigue; when he gets up quickly he may upon occasion get lightheaded      Hyperlipidemia  The patient remains on no medications. Last checked in March 2019 with a total cholesterol 192 HDL 51 . His risk is calculated here below   The 10-year ASCVD risk score (Renetta Diaz., et al., 2013) is: 17.2%    Values used to calculate the score:      Age: 68 years      Sex: Male      Is Non- : No      Diabetic: No      Tobacco smoker: No      Systolic Blood Pressure: 269 mmHg      Is BP treated: Yes      HDL Cholesterol: 51 mg/dL      Total Cholesterol: 192 mg/dL   He denies any chest pains or chest pressure.        Sick sinus syndrome / atrial fibrillation   The patient is under the care of Dr. Conrado Fabian at [facility] does have a pacemaker placed. Papa White sees her every 6 month did have an echocardiogram in March of 2018 which showed mild-to-moderate tricuspid regurg, otherwise normal left ventricular function. He now see Dr. Angelina Gates at [facility].   Wife and patient states that a form for medical clearance/cardiac vascular disease- of aneurysm     Social History     Tobacco Use    Smoking status: Former Smoker     Packs/day: 2.00     Years: 15.00     Pack years: 30.00     Last attempt to quit: 10/18/1981     Years since quittin.6    Smokeless tobacco: Never Used   Substance Use Topics    Alcohol use: No        Current Outpatient Medications   Medication Sig Dispense Refill    meloxicam (MOBIC) 15 MG tablet take 1 tablet by mouth once daily 30 tablet 1    lisinopril-hydrochlorothiazide (PRINZIDE;ZESTORETIC) 10-12.5 MG per tablet Take 0.5 tablets by mouth daily 30 tablet 11    CPAP Machine MISC by Does not apply route Pressure of 13      donepezil (ARICEPT) 5 MG tablet take 1 tablet by mouth every evening 30 tablet 3    MYRBETRIQ 25 MG TB24 TAKE 1 TABLET BY MOUTH ONCE DAILY 30 tablet 5    vitamin B-12 (CYANOCOBALAMIN) 1000 MCG tablet Take 1,000 mcg by mouth daily      ELIQUIS 5 MG TABS tablet Take 5 mg by mouth 2 times daily   0    Coenzyme Q10 10 MG CAPS Take 1 capsule by mouth daily      Glucosamine 750 MG TABS Take 1 capsule by mouth daily       No current facility-administered medications for this visit.       Allergies   Allergen Reactions    No Known Allergies      Other reaction(s): Unknown Reaction    Pravastatin        Health Maintenance   Topic Date Due    DTaP/Tdap/Td vaccine (1 - Tdap) 2008    Shingles Vaccine (2 of 3) 2010    Colon cancer screen colonoscopy  2017    Potassium monitoring  2020    Creatinine monitoring  2020    Lipid screen  2023    Flu vaccine  Completed    Pneumococcal 65+ years Vaccine  Completed    AAA screen  Completed    Hepatitis C screen  Completed       Lab Results   Component Value Date    K 4.3 2019    CREATININE 1.0 2019    AST 23 2019    ALT 22 2019    HCT 40.7 2019    LABA1C 5.4 2012    GLUCOSE 83 2019    CALCIUM 9.6 2019      Lab Results   Component Value Date    CHOL 192 03/14/2018    CHOL 166 07/11/2017    TRIG 123 03/14/2018    HDL 48 07/11/2017       Subjective:      Review of Systems   Constitutional: Negative for activity change (for the better), chills, diaphoresis, fatigue and fever. HENT: Negative. Respiratory: Negative for choking, chest tightness, shortness of breath and wheezing. Cardiovascular: Negative for chest pain, palpitations and leg swelling. Gastrointestinal: Negative for abdominal distention, abdominal pain and blood in stool. Genitourinary: Positive for enuresis and urgency. Negative for difficulty urinating, dysuria, frequency and hematuria. Musculoskeletal: Positive for gait problem. Negative for back pain. Neurological: Negative for dizziness. Hematological: Negative for adenopathy. Psychiatric/Behavioral: Negative for dysphoric mood. Objective:     /60   Pulse 60   Resp 14   Wt 235 lb (106.6 kg)   SpO2 97%   BMI 32.78 kg/m²     Physical Exam   Constitutional: He appears well-developed and well-nourished. No distress. HENT:   Right Ear: Tympanic membrane normal.   Left Ear: Tympanic membrane normal.   Mouth/Throat: Oropharynx is clear and moist and mucous membranes are normal.   consticted oropharynx    Neck: Neck supple. Carotid bruit is not present. No thyromegaly present. Cardiovascular: Regular rhythm, S1 normal and S2 normal.   No murmur heard. Pulmonary/Chest: Breath sounds normal. He has no wheezes. He has no rhonchi. He has no rales. Abdominal: Soft. Bowel sounds are normal.   Musculoskeletal: He exhibits no edema (of lower extremities). Lymphadenopathy:     He has no cervical adenopathy. He has no axillary adenopathy. Neurological:   Unsteady gait    Skin: No rash noted. Vitals reviewed. Assessment:      Diagnosis Orders   1. Preop examination     2. Essential hypertension     3. Paroxysmal atrial fibrillation (HCC)  ECHO Complete 2D W Doppler W Color   4. Sick sinus syndrome ([address]     5. Third degree heart block ([facility]     6. Mild cognitive impairment with memory loss     7. Mixed hyperlipidemia     8. Malignant neoplasm of prostate ([facility]              POC Testing Results (If Applicable):  No results found for this visit on 06/10/19. Plan:   Patient is moderate risk for the procedure. From a general medical standpoint. In that regards I can give him clearance. However I tried to make a clear that they need the definitive directions from Dr. Rona Henry on how to manage his anticoagulation (Eliquis) perioperatively. Otherwise he can continue his medications and proceed with Botox. Return to the office as scheduled      Orders Given:  Orders Placed This Encounter   Procedures    ECHO Complete 2D W Doppler W Color     Copy of report to go to Dr Rona Henry; [facility]     Standing Status:   Future     Standing Expiration Date:   6/10/2020     Order Specific Question:   Reason for exam:     Answer:   atrial fibrillation     Prescriptions:    No orders of the defined types were placed in this encounter. Return in about 5 months (around 11/15/2019). Electronically signed by Jacqueline Montgomery MD on6/11/2019. **This report has been created using voice recognition software. It may contain minor errors which are inherent in voice recognition technology. **

## 2019-06-11 ENCOUNTER — TELEPHONE (OUTPATIENT)
Dept: FAMILY MEDICINE CLINIC | Age: 74
End: 2019-06-11

## 2019-06-21 LAB
BASOPHILS ABSOLUTE: 0.1 10'3/UL (ref 0–0.2)
BASOPHILS RELATIVE PERCENT: 0.7 % (ref 0.2–2)
BUN BLDV-MCNC: 14 MG/DL (ref 7–18)
CALCIUM SERPL-MCNC: 10 MG/DL (ref 8.5–10.1)
CHLORIDE BLD-SCNC: 108 MMOL/L (ref 97–107)
CO2: 24 MMOL/L (ref 21–32)
CREAT SERPL-MCNC: 0.9 MG/DL (ref 0.7–1.3)
EOSINOPHILS ABSOLUTE: 0.1 10'3/UL (ref 0–0.4)
EOSINOPHILS RELATIVE PERCENT: 0.9 % (ref 0–6.3)
GFR CALCULATED: > 60
GLUCOSE: 78 MG/DL (ref 70–99)
HCT VFR BLD CALC: 42.6 % (ref 39.8–49.4)
HEMOGLOBIN: 13.9 G/DL (ref 13.5–16.8)
LYMPHOCYTES ABSOLUTE: 1.3 10'3/UL (ref 0.4–3.6)
LYMPHOCYTES RELATIVE PERCENT: 14.3 % (ref 13.3–42.4)
MCH RBC QN AUTO: 28.7 PG (ref 27.9–33.7)
MCHC RBC AUTO-ENTMCNC: 32.7 G/DL (ref 33–35.2)
MCV RBC AUTO: 87.9 FL (ref 83.9–97)
MONOCYTES ABSOLUTE: 0.7 10'3/UL (ref 0.3–1)
MONOCYTES RELATIVE PERCENT: 7.9 % (ref 5.2–13.6)
NEUTROPHILS ABSOLUTE: 6.8 10'3/UL (ref 2.2–6.3)
NEUTROPHILS SEGMENTED: 76.2 % (ref 43.5–74.5)
PDW BLD-RTO: 13.8 % (ref 11.7–15.5)
PLATELET # BLD: 235 10'3/UL (ref 144–327)
PMV BLD AUTO: 9.9 FL (ref 7.2–10.4)
POTASSIUM SERPL-SCNC: 4.4 MMOL/L (ref 3.5–5.1)
RBC # BLD: 4.84 10'6/UL (ref 4.24–5.64)
SODIUM BLD-SCNC: 143 MMOL/L (ref 136–145)
URINE CULTURE, ROUTINE: NORMAL
WBC: 8.9 10'3/UL (ref 4.1–10.2)

## 2019-08-02 DIAGNOSIS — G31.84 MILD COGNITIVE IMPAIRMENT WITH MEMORY LOSS: Primary | ICD-10-CM

## 2019-08-02 RX ORDER — DONEPEZIL HYDROCHLORIDE 5 MG/1
TABLET, FILM COATED ORAL
Qty: 30 TABLET | Refills: 3 | Status: SHIPPED | OUTPATIENT
Start: 2019-08-02 | End: 2019-11-15

## 2019-08-07 DIAGNOSIS — R32 URINARY INCONTINENCE, UNSPECIFIED TYPE: ICD-10-CM

## 2019-08-07 RX ORDER — MIRABEGRON 25 MG/1
TABLET, FILM COATED, EXTENDED RELEASE ORAL
Qty: 30 TABLET | Refills: 5 | Status: SHIPPED | OUTPATIENT
Start: 2019-08-07 | End: 2020-03-04

## 2019-10-11 ENCOUNTER — TELEPHONE (OUTPATIENT)
Dept: FAMILY MEDICINE CLINIC | Age: 74
End: 2019-10-11

## 2019-10-11 DIAGNOSIS — I48.0 PAROXYSMAL ATRIAL FIBRILLATION (HCC): Primary | ICD-10-CM

## 2019-10-11 DIAGNOSIS — I44.2 THIRD DEGREE HEART BLOCK (HCC): ICD-10-CM

## 2019-10-11 DIAGNOSIS — I49.5 SICK SINUS SYNDROME (HCC): ICD-10-CM

## 2019-10-25 ENCOUNTER — NURSE ONLY (OUTPATIENT)
Dept: FAMILY MEDICINE CLINIC | Age: 74
End: 2019-10-25
Payer: MEDICARE

## 2019-10-25 VITALS — TEMPERATURE: 96.8 F

## 2019-10-25 DIAGNOSIS — Z23 NEED FOR PROPHYLACTIC VACCINATION AND INOCULATION AGAINST INFLUENZA: Primary | ICD-10-CM

## 2019-10-25 PROCEDURE — G0008 ADMIN INFLUENZA VIRUS VAC: HCPCS | Performed by: FAMILY MEDICINE

## 2019-10-25 PROCEDURE — 90653 IIV ADJUVANT VACCINE IM: CPT | Performed by: FAMILY MEDICINE

## 2019-10-30 ENCOUNTER — OFFICE VISIT (OUTPATIENT)
Dept: PULMONOLOGY | Age: 74
End: 2019-10-30
Payer: MEDICARE

## 2019-10-30 VITALS
HEART RATE: 64 BPM | DIASTOLIC BLOOD PRESSURE: 72 MMHG | HEIGHT: 71 IN | BODY MASS INDEX: 32.23 KG/M2 | SYSTOLIC BLOOD PRESSURE: 128 MMHG | WEIGHT: 230.2 LBS | OXYGEN SATURATION: 96 %

## 2019-10-30 DIAGNOSIS — G47.33 SEVERE OBSTRUCTIVE SLEEP APNEA: ICD-10-CM

## 2019-10-30 DIAGNOSIS — G47.33 OSA ON CPAP: Primary | ICD-10-CM

## 2019-10-30 DIAGNOSIS — I10 ESSENTIAL HYPERTENSION: ICD-10-CM

## 2019-10-30 DIAGNOSIS — Z99.89 OSA ON CPAP: Primary | ICD-10-CM

## 2019-10-30 PROCEDURE — G8417 CALC BMI ABV UP PARAM F/U: HCPCS | Performed by: INTERNAL MEDICINE

## 2019-10-30 PROCEDURE — 3017F COLORECTAL CA SCREEN DOC REV: CPT | Performed by: INTERNAL MEDICINE

## 2019-10-30 PROCEDURE — G8482 FLU IMMUNIZE ORDER/ADMIN: HCPCS | Performed by: INTERNAL MEDICINE

## 2019-10-30 PROCEDURE — 1036F TOBACCO NON-USER: CPT | Performed by: INTERNAL MEDICINE

## 2019-10-30 PROCEDURE — 4040F PNEUMOC VAC/ADMIN/RCVD: CPT | Performed by: INTERNAL MEDICINE

## 2019-10-30 PROCEDURE — G8427 DOCREV CUR MEDS BY ELIG CLIN: HCPCS | Performed by: INTERNAL MEDICINE

## 2019-10-30 PROCEDURE — 99213 OFFICE O/P EST LOW 20 MIN: CPT | Performed by: INTERNAL MEDICINE

## 2019-10-30 PROCEDURE — 1123F ACP DISCUSS/DSCN MKR DOCD: CPT | Performed by: INTERNAL MEDICINE

## 2019-10-30 RX ORDER — ASCORBIC ACID 500 MG
500 TABLET ORAL DAILY
COMMUNITY
End: 2019-11-08 | Stop reason: CLARIF

## 2019-10-30 RX ORDER — CHLORAL HYDRATE 500 MG
1000 CAPSULE ORAL DAILY
COMMUNITY
End: 2019-11-08 | Stop reason: CLARIF

## 2019-10-30 ASSESSMENT — SLEEP AND FATIGUE QUESTIONNAIRES
HOW LIKELY ARE YOU TO NOD OFF OR FALL ASLEEP WHILE WATCHING TV: 1
HOW LIKELY ARE YOU TO NOD OFF OR FALL ASLEEP WHILE SITTING AND TALKING TO SOMEONE: 0
HOW LIKELY ARE YOU TO NOD OFF OR FALL ASLEEP IN A CAR, WHILE STOPPED FOR A FEW MINUTES IN TRAFFIC: 0
HOW LIKELY ARE YOU TO NOD OFF OR FALL ASLEEP WHILE LYING DOWN TO REST IN THE AFTERNOON WHEN CIRCUMSTANCES PERMIT: 2
HOW LIKELY ARE YOU TO NOD OFF OR FALL ASLEEP WHILE SITTING AND READING: 0
HOW LIKELY ARE YOU TO NOD OFF OR FALL ASLEEP WHILE SITTING QUIETLY AFTER LUNCH WITHOUT ALCOHOL: 1
ESS TOTAL SCORE: 4
HOW LIKELY ARE YOU TO NOD OFF OR FALL ASLEEP WHEN YOU ARE A PASSENGER IN A CAR FOR AN HOUR WITHOUT A BREAK: 0
HOW LIKELY ARE YOU TO NOD OFF OR FALL ASLEEP WHILE SITTING INACTIVE IN A PUBLIC PLACE: 0

## 2019-11-08 ENCOUNTER — OFFICE VISIT (OUTPATIENT)
Dept: CARDIOLOGY CLINIC | Age: 74
End: 2019-11-08
Payer: MEDICARE

## 2019-11-08 VITALS
DIASTOLIC BLOOD PRESSURE: 70 MMHG | BODY MASS INDEX: 31.93 KG/M2 | SYSTOLIC BLOOD PRESSURE: 108 MMHG | HEART RATE: 60 BPM | WEIGHT: 228.8 LBS

## 2019-11-08 DIAGNOSIS — I44.2 THIRD DEGREE HEART BLOCK (HCC): ICD-10-CM

## 2019-11-08 DIAGNOSIS — I48.0 PAF (PAROXYSMAL ATRIAL FIBRILLATION) (HCC): ICD-10-CM

## 2019-11-08 DIAGNOSIS — Z87.891 FORMER TOBACCO USE: ICD-10-CM

## 2019-11-08 DIAGNOSIS — Z95.0 S/P PLACEMENT OF CARDIAC PACEMAKER: ICD-10-CM

## 2019-11-08 DIAGNOSIS — I49.5 SSS (SICK SINUS SYNDROME) (HCC): ICD-10-CM

## 2019-11-08 DIAGNOSIS — F03.90 DEMENTIA WITHOUT BEHAVIORAL DISTURBANCE, UNSPECIFIED DEMENTIA TYPE: ICD-10-CM

## 2019-11-08 DIAGNOSIS — I10 ESSENTIAL HYPERTENSION: ICD-10-CM

## 2019-11-08 DIAGNOSIS — I48.0 PAROXYSMAL ATRIAL FIBRILLATION (HCC): Primary | ICD-10-CM

## 2019-11-08 DIAGNOSIS — E78.2 MIXED HYPERLIPIDEMIA: ICD-10-CM

## 2019-11-08 PROCEDURE — G8417 CALC BMI ABV UP PARAM F/U: HCPCS | Performed by: INTERNAL MEDICINE

## 2019-11-08 PROCEDURE — G8482 FLU IMMUNIZE ORDER/ADMIN: HCPCS | Performed by: INTERNAL MEDICINE

## 2019-11-08 PROCEDURE — G8428 CUR MEDS NOT DOCUMENT: HCPCS | Performed by: INTERNAL MEDICINE

## 2019-11-08 PROCEDURE — 99204 OFFICE O/P NEW MOD 45 MIN: CPT | Performed by: INTERNAL MEDICINE

## 2019-11-15 ENCOUNTER — OFFICE VISIT (OUTPATIENT)
Dept: FAMILY MEDICINE CLINIC | Age: 74
End: 2019-11-15
Payer: MEDICARE

## 2019-11-15 ENCOUNTER — HOSPITAL ENCOUNTER (OUTPATIENT)
Age: 74
Setting detail: SPECIMEN
Discharge: HOME | End: 2019-11-15
Payer: MEDICARE

## 2019-11-15 ENCOUNTER — OFFICE VISIT (OUTPATIENT)
Dept: CARDIOLOGY CLINIC | Age: 74
End: 2019-11-15
Payer: MEDICARE

## 2019-11-15 VITALS
DIASTOLIC BLOOD PRESSURE: 70 MMHG | SYSTOLIC BLOOD PRESSURE: 128 MMHG | BODY MASS INDEX: 31.93 KG/M2 | WEIGHT: 228.84 LBS | HEART RATE: 64 BPM

## 2019-11-15 VITALS
OXYGEN SATURATION: 92 % | WEIGHT: 230 LBS | BODY MASS INDEX: 32.09 KG/M2 | SYSTOLIC BLOOD PRESSURE: 118 MMHG | DIASTOLIC BLOOD PRESSURE: 78 MMHG | HEART RATE: 73 BPM

## 2019-11-15 DIAGNOSIS — Z87.891 FORMER TOBACCO USE: ICD-10-CM

## 2019-11-15 DIAGNOSIS — E78.2 MIXED HYPERLIPIDEMIA: ICD-10-CM

## 2019-11-15 DIAGNOSIS — G47.33 OSA TREATED WITH BIPAP: ICD-10-CM

## 2019-11-15 DIAGNOSIS — R32 URINARY INCONTINENCE, UNSPECIFIED TYPE: ICD-10-CM

## 2019-11-15 DIAGNOSIS — I10 ESSENTIAL HYPERTENSION: Primary | ICD-10-CM

## 2019-11-15 DIAGNOSIS — Z12.11 SCREENING FOR COLORECTAL CANCER: ICD-10-CM

## 2019-11-15 DIAGNOSIS — E66.09 OBESITY DUE TO EXCESS CALORIES WITH SERIOUS COMORBIDITY IN PEDIATRIC PATIENT, UNSPECIFIED BMI: ICD-10-CM

## 2019-11-15 DIAGNOSIS — I10 ESSENTIAL HYPERTENSION: ICD-10-CM

## 2019-11-15 DIAGNOSIS — I48.0 PAROXYSMAL ATRIAL FIBRILLATION (HCC): ICD-10-CM

## 2019-11-15 DIAGNOSIS — I95.1 AUTONOMIC ORTHOSTATIC HYPOTENSION: ICD-10-CM

## 2019-11-15 DIAGNOSIS — I44.2 THIRD DEGREE HEART BLOCK (HCC): ICD-10-CM

## 2019-11-15 DIAGNOSIS — C61 MALIGNANT NEOPLASM OF PROSTATE (HCC): ICD-10-CM

## 2019-11-15 DIAGNOSIS — G56.03 BILATERAL CARPAL TUNNEL SYNDROME: ICD-10-CM

## 2019-11-15 DIAGNOSIS — Z12.12 SCREENING FOR COLORECTAL CANCER: ICD-10-CM

## 2019-11-15 DIAGNOSIS — G31.84 MILD COGNITIVE IMPAIRMENT WITH MEMORY LOSS: ICD-10-CM

## 2019-11-15 DIAGNOSIS — I49.5 SICK SINUS SYNDROME (HCC): ICD-10-CM

## 2019-11-15 DIAGNOSIS — I49.5 SSS (SICK SINUS SYNDROME) (HCC): ICD-10-CM

## 2019-11-15 DIAGNOSIS — F03.90 DEMENTIA WITHOUT BEHAVIORAL DISTURBANCE, UNSPECIFIED DEMENTIA TYPE: Primary | ICD-10-CM

## 2019-11-15 DIAGNOSIS — I44.2 CHB (COMPLETE HEART BLOCK) (HCC): ICD-10-CM

## 2019-11-15 LAB
CHOLESTEROL/HDL RATIO: 3.6
CHOLESTEROL: 187 MG/DL
HDLC SERPL-MCNC: 52 MG/DL
LDL CHOLESTEROL: 104 MG/DL (ref 0–130)
TRIGL SERPL-MCNC: 156 MG/DL
VLDLC SERPL CALC-MCNC: ABNORMAL MG/DL (ref 1–30)

## 2019-11-15 PROCEDURE — 3017F COLORECTAL CA SCREEN DOC REV: CPT | Performed by: INTERNAL MEDICINE

## 2019-11-15 PROCEDURE — 99214 OFFICE O/P EST MOD 30 MIN: CPT | Performed by: FAMILY MEDICINE

## 2019-11-15 PROCEDURE — G8482 FLU IMMUNIZE ORDER/ADMIN: HCPCS | Performed by: INTERNAL MEDICINE

## 2019-11-15 PROCEDURE — G8417 CALC BMI ABV UP PARAM F/U: HCPCS | Performed by: INTERNAL MEDICINE

## 2019-11-15 PROCEDURE — G8482 FLU IMMUNIZE ORDER/ADMIN: HCPCS | Performed by: FAMILY MEDICINE

## 2019-11-15 PROCEDURE — 80061 LIPID PANEL: CPT

## 2019-11-15 PROCEDURE — 4040F PNEUMOC VAC/ADMIN/RCVD: CPT | Performed by: INTERNAL MEDICINE

## 2019-11-15 PROCEDURE — G8417 CALC BMI ABV UP PARAM F/U: HCPCS | Performed by: FAMILY MEDICINE

## 2019-11-15 PROCEDURE — 99214 OFFICE O/P EST MOD 30 MIN: CPT | Performed by: INTERNAL MEDICINE

## 2019-11-15 PROCEDURE — 36415 COLL VENOUS BLD VENIPUNCTURE: CPT

## 2019-11-15 PROCEDURE — 1123F ACP DISCUSS/DSCN MKR DOCD: CPT | Performed by: FAMILY MEDICINE

## 2019-11-15 PROCEDURE — 4040F PNEUMOC VAC/ADMIN/RCVD: CPT | Performed by: FAMILY MEDICINE

## 2019-11-15 PROCEDURE — 1036F TOBACCO NON-USER: CPT | Performed by: INTERNAL MEDICINE

## 2019-11-15 PROCEDURE — 3017F COLORECTAL CA SCREEN DOC REV: CPT | Performed by: FAMILY MEDICINE

## 2019-11-15 PROCEDURE — 1123F ACP DISCUSS/DSCN MKR DOCD: CPT | Performed by: INTERNAL MEDICINE

## 2019-11-15 PROCEDURE — 1036F TOBACCO NON-USER: CPT | Performed by: FAMILY MEDICINE

## 2019-11-15 PROCEDURE — G8427 DOCREV CUR MEDS BY ELIG CLIN: HCPCS | Performed by: INTERNAL MEDICINE

## 2019-11-15 PROCEDURE — G8427 DOCREV CUR MEDS BY ELIG CLIN: HCPCS | Performed by: FAMILY MEDICINE

## 2019-11-15 ASSESSMENT — ENCOUNTER SYMPTOMS
BLOOD IN STOOL: 0
ABDOMINAL DISTENTION: 0
WHEEZING: 0
ABDOMINAL PAIN: 0
CHOKING: 0
SHORTNESS OF BREATH: 0
BACK PAIN: 0

## 2019-12-09 ENCOUNTER — TELEPHONE (OUTPATIENT)
Dept: FAMILY MEDICINE CLINIC | Age: 74
End: 2019-12-09

## 2019-12-09 DIAGNOSIS — I10 ESSENTIAL HYPERTENSION: Primary | ICD-10-CM

## 2019-12-09 DIAGNOSIS — R32 URINARY INCONTINENCE, UNSPECIFIED TYPE: ICD-10-CM

## 2019-12-30 ENCOUNTER — HOSPITAL ENCOUNTER (OUTPATIENT)
Age: 74
Setting detail: SPECIMEN
Discharge: HOME | End: 2019-12-30
Payer: MEDICARE

## 2019-12-30 DIAGNOSIS — I10 ESSENTIAL HYPERTENSION: ICD-10-CM

## 2019-12-30 LAB
ABSOLUTE EOS #: 0.13 K/UL (ref 0–0.44)
ABSOLUTE IMMATURE GRANULOCYTE: 0.07 K/UL (ref 0–0.3)
ABSOLUTE LYMPH #: 1.62 K/UL (ref 1.1–3.7)
ABSOLUTE MONO #: 0.6 K/UL (ref 0.1–1.2)
ALBUMIN SERPL-MCNC: 4.2 G/DL (ref 3.5–5.2)
ALBUMIN/GLOBULIN RATIO: 1.5 (ref 1–2.5)
ALP BLD-CCNC: 57 U/L (ref 40–129)
ALT SERPL-CCNC: 18 U/L (ref 5–41)
ANION GAP SERPL CALCULATED.3IONS-SCNC: 11 MMOL/L (ref 9–17)
AST SERPL-CCNC: 21 U/L
BASOPHILS # BLD: 1 % (ref 0–2)
BASOPHILS ABSOLUTE: 0.05 K/UL (ref 0–0.2)
BILIRUB SERPL-MCNC: 0.4 MG/DL (ref 0.3–1.2)
BUN BLDV-MCNC: 14 MG/DL (ref 8–23)
BUN/CREAT BLD: 15 (ref 9–20)
CALCIUM SERPL-MCNC: 9.4 MG/DL (ref 8.6–10.4)
CHLORIDE BLD-SCNC: 103 MMOL/L (ref 98–107)
CO2: 29 MMOL/L (ref 20–31)
CREAT SERPL-MCNC: 0.91 MG/DL (ref 0.7–1.2)
DIFFERENTIAL TYPE: ABNORMAL
EOSINOPHILS RELATIVE PERCENT: 2 % (ref 1–4)
GFR AFRICAN AMERICAN: >60 ML/MIN
GFR NON-AFRICAN AMERICAN: >60 ML/MIN
GFR SERPL CREATININE-BSD FRML MDRD: NORMAL ML/MIN/{1.73_M2}
GFR SERPL CREATININE-BSD FRML MDRD: NORMAL ML/MIN/{1.73_M2}
GLUCOSE BLD-MCNC: 80 MG/DL (ref 70–99)
HCT VFR BLD CALC: 42.2 % (ref 40.7–50.3)
HEMOGLOBIN: 13.6 G/DL (ref 13–17)
IMMATURE GRANULOCYTES: 1 %
LYMPHOCYTES # BLD: 24 % (ref 24–43)
MCH RBC QN AUTO: 29.2 PG (ref 25.2–33.5)
MCHC RBC AUTO-ENTMCNC: 32.2 G/DL (ref 25.2–33.5)
MCV RBC AUTO: 90.6 FL (ref 82.6–102.9)
MONOCYTES # BLD: 9 % (ref 3–12)
NRBC AUTOMATED: 0 PER 100 WBC
PDW BLD-RTO: 13.2 % (ref 11.8–14.4)
PLATELET # BLD: 200 K/UL (ref 138–453)
PLATELET ESTIMATE: ABNORMAL
PMV BLD AUTO: 13.1 FL (ref 8.1–13.5)
POTASSIUM SERPL-SCNC: 4.9 MMOL/L (ref 3.7–5.3)
RBC # BLD: 4.66 M/UL (ref 4.21–5.77)
RBC # BLD: ABNORMAL 10*6/UL
SEG NEUTROPHILS: 64 % (ref 36–65)
SEGMENTED NEUTROPHILS ABSOLUTE COUNT: 4.34 K/UL (ref 1.5–8.1)
SODIUM BLD-SCNC: 143 MMOL/L (ref 135–144)
TOTAL PROTEIN: 7 G/DL (ref 6.4–8.3)
WBC # BLD: 6.8 K/UL (ref 3.5–11.3)
WBC # BLD: ABNORMAL 10*3/UL

## 2019-12-30 PROCEDURE — 36415 COLL VENOUS BLD VENIPUNCTURE: CPT

## 2019-12-30 PROCEDURE — 85025 COMPLETE CBC W/AUTO DIFF WBC: CPT

## 2019-12-30 PROCEDURE — 80053 COMPREHEN METABOLIC PANEL: CPT

## 2020-01-03 RX ORDER — DONEPEZIL HYDROCHLORIDE 5 MG/1
TABLET, FILM COATED ORAL
Qty: 30 TABLET | Refills: 5 | Status: SHIPPED | OUTPATIENT
Start: 2020-01-03 | End: 2020-07-27 | Stop reason: SDUPTHER

## 2020-01-15 ENCOUNTER — PROCEDURE VISIT (OUTPATIENT)
Dept: CARDIOLOGY | Age: 75
End: 2020-01-15
Payer: MEDICARE

## 2020-01-15 PROCEDURE — 93288 INTERROG EVL PM/LDLS PM IP: CPT | Performed by: INTERNAL MEDICINE

## 2020-03-03 NOTE — TELEPHONE ENCOUNTER
Latesha Thompson is calling to request a refill on the following medication(s):  Requested Prescriptions     Pending Prescriptions Disp Refills    MYRBETRIQ [address] [Pharmacy Med Name: [facility] 25 MG TABLET] 30 tablet 5     Sig: take 1 tablet by mouth once daily       Last Visit Date (If Applicable):  39/20/8029    Next Visit Date:    5/13/2020

## 2020-03-04 RX ORDER — MIRABEGRON 25 MG/1
TABLET, FILM COATED, EXTENDED RELEASE ORAL
Qty: 30 TABLET | Refills: 5 | Status: SHIPPED | OUTPATIENT
Start: 2020-03-04 | End: 2020-09-15 | Stop reason: SDUPTHER

## 2020-03-06 ENCOUNTER — OFFICE VISIT (OUTPATIENT)
Dept: CARDIOLOGY CLINIC | Age: 75
End: 2020-03-06
Payer: MEDICARE

## 2020-03-06 VITALS
HEIGHT: 71 IN | SYSTOLIC BLOOD PRESSURE: 140 MMHG | BODY MASS INDEX: 33.18 KG/M2 | DIASTOLIC BLOOD PRESSURE: 72 MMHG | HEART RATE: 65 BPM | WEIGHT: 237 LBS

## 2020-03-06 PROCEDURE — G8427 DOCREV CUR MEDS BY ELIG CLIN: HCPCS | Performed by: INTERNAL MEDICINE

## 2020-03-06 PROCEDURE — 3017F COLORECTAL CA SCREEN DOC REV: CPT | Performed by: INTERNAL MEDICINE

## 2020-03-06 PROCEDURE — 1123F ACP DISCUSS/DSCN MKR DOCD: CPT | Performed by: INTERNAL MEDICINE

## 2020-03-06 PROCEDURE — 99214 OFFICE O/P EST MOD 30 MIN: CPT | Performed by: INTERNAL MEDICINE

## 2020-03-06 PROCEDURE — G8482 FLU IMMUNIZE ORDER/ADMIN: HCPCS | Performed by: INTERNAL MEDICINE

## 2020-03-06 PROCEDURE — G8417 CALC BMI ABV UP PARAM F/U: HCPCS | Performed by: INTERNAL MEDICINE

## 2020-03-06 PROCEDURE — 4040F PNEUMOC VAC/ADMIN/RCVD: CPT | Performed by: INTERNAL MEDICINE

## 2020-03-06 PROCEDURE — 1036F TOBACCO NON-USER: CPT | Performed by: INTERNAL MEDICINE

## 2020-03-06 NOTE — PROGRESS NOTES
Cardiology Consultation  [facility]  [place], Corina Everett Jeflloyd Smart)    20    Patient is here for follow up Afib and abn ECG, 3rd degree avb, SSS. Used to see [facility] cardio. HPI and Chief Complaint:  Kelly Meyer  is doing well from a cardiac standpoint. Good functional capacity with no significant change in functional capacity. No chest pain, no dyspnea, no PND, no syncope or pre-syncope, no orthopnea. No symptoms of CHF or angina/chest pain. Patient referred from pcp for afib, SSS, and possible heart block. Currently has A-paced rhythm. Was feeling fatigued, tired, sleepy. Feels 200% better since stopping BP meds. Past Medical History:   Diagnosis Date    Diverticulosis     Hyperlipidemia     Low HDL    Hyperuricemia     Obesity     Osteoarthrosis     not designated as generalized or localized    Overactive bladder     Prostate cancer ([facility] [address]     Third degree heart block ([facility] [address] 2017       Past Surgical History:   Procedure Laterality Date    A-V CARDIAC PACEMAKER INSERTION      Dr Tiago Hickey;  [facility]    COLONOSCOPY  2007    diverticulosis    CYSTOSCOPY  2019    with Botox    FIBULA FRACTURE SURGERY Right 2000    [facility]    HIP ARTHROPLASTY Bilateral ,    Dr Jonny Kasper  2017    PROSTATE BIOPSY  07/10/2008    PROSTATECTOMY      w/lymph node dissection Dr Jabari Hurley Right 10/2013    Dr Johanne Fleming,     Flexible       Family History   Problem Relation Age of Onset    Alzheimer's Disease Mother     Emphysema Father     Other Father         vascular disease- of aneurysm       Social History     Socioeconomic History    Marital status:      Spouse name: None    Number of children: None    Years of education: None    Highest education level: None   Occupational History    None   Social Needs    Financial resource strain: None    Food insecurity: Worry: None     Inability: None    Transportation needs:     Medical: None     Non-medical: None   Tobacco Use    Smoking status: Former Smoker     Packs/day: 2.00     Years: 15.00     Pack years: 30.00     Last attempt to quit: 10/18/1981     Years since quittin.4    Smokeless tobacco: Never Used   Substance and Sexual Activity    Alcohol use: No    Drug use: No    Sexual activity: None   Lifestyle    Physical activity:     Days per week: None     Minutes per session: None    Stress: None   Relationships    Social connections:     Talks on phone: None     Gets together: None     Attends [employer] service: None     Active member of club or organization: None     Attends meetings of clubs or organizations: None     Relationship status: None    Intimate partner violence:     Fear of current or ex partner: None     Emotionally abused: None     Physically abused: None     Forced sexual activity: None   Other Topics Concern    None   Social History Narrative    None        Allergies   Allergen Reactions    Pravastatin        Current Outpatient Medications   Medication Sig Dispense Refill    MYRBETRIQ 25 MG TB24 take 1 tablet by mouth once daily 30 tablet 5    donepezil (ARICEPT) 5 MG tablet take 1 tablet by mouth every evening 30 tablet 5    Glucosamine-Chondroit-Vit C-Mn (GLUCOSAMINE 1500 COMPLEX PO) Take by mouth      vitamin B-12 (CYANOCOBALAMIN) 1000 MCG tablet Take 1,000 mcg by mouth daily      ELIQUIS 5 MG TABS tablet Take 5 mg by mouth 2 times daily   0    Coenzyme Q10 10 MG CAPS Take 1 capsule by mouth daily      CPAP Machine MISC by Does not apply route Pressure of 13       No current facility-administered medications for this visit.          Patient Active Problem List   Diagnosis    Hyperlipidemia    Essential hypertension    Malignant [facility] of prostate [facility] [address] [facility]    Obesity    Diverticulosis of intestine without perforation or abscess without bleeding    Osteoarthritis of knee    Third degree heart block (HCC)    Sick sinus syndrome (HCC)    Paroxysmal atrial fibrillation (HCC)    Mild cognitive impairment with memory loss    SHONDA treated with BiPAP           REVIEW OF SYSTEMS:    · Constitutional: there has been no unanticipated weight loss. There's been No change in energy level, No change in activity level. · Eyes: No visual changes or diplopia. No scleral icterus. · ENT: No Headaches, hearing loss or vertigo. No mouth sores or sore throat. · Cardiovascular: No chest pain, no dyspnea, no chf like symptoms  · Respiratory: No previous pulmonary problems  · Gastrointestinal: No abdominal pain, appetite loss, blood in stools. No change in bowel or bladder habits. · Genitourinary: No dysuria, trouble voiding, or hematuria. · Musculoskeletal:  No gait disturbance, No weakness or joint complaints. · Integumentary: No rash or pruritis. · Neurological: No headache, diplopia, change in muscle strength, numbness or tingling. No change in gait, balance, coordination, mood, affect, memory, mentation, behavior. · Psychiatric: No new anxiety or depression. · Endocrine: No temperature intolerance. No excessive thirst, fluid intake, or urination. No tremor. · Hematologic/Lymphatic: No abnormal bruising or bleeding, blood clots or swollen lymph nodes. · Allergic/Immunologic: No nasal congestion or hives. Physical Exam:   Vitals: BP (!) 142/70 (Site: Left Upper Arm, Position: Sitting)   Pulse 65 Comment: sl irreg   5' 11\" (1.803 m)   Wt 237 lb (107.5 kg)   BMI 33.05 kg/m²   General appearance: alert and cooperative with exam  HEENT: Head: Normocephalic, no lesions, without obvious abnormality.   Eyes: PERRL, EOMI  Ears: Not obvious deformations or lack of hearing  Neck: no carotid bruit, no JVD  Lungs: clear to auscultation bilaterally  Heart: regular rate and rhythm, S1, S2 normal, no murmur, click, rub or gallop  Abdomen: soft, non-tender; bowel sounds normal; no masses,  no organomegaly  Extremities: extremities normal, atraumatic, no cyanosis or edema  Neurologic: Mental status: Alert, oriented, thought content appropriate  Skin: WNL for age and condition, no obvious rashes or leasions      EKG: A-paced Rhythm with no ischemic changes. Reviewed today's ECG along serial ECGs    LAST ECHO:    LAST STRESS:    LAST CATH:      Past Medical and Surgical History, Problem List, Allergies, Medications, Labs, Imaging, all reviewed extensively in EMR and with the patient. Assessment and Plan:    1. Fatigued and tired with low BP. We had stopped lisinopril 10/12.5, he was on half a pill with still very low BP. Feels 200% better since stopping BP meds. 2. Dementia- is on aricept- may not need this if BP goes higher. 3. Afib- has been on Eliquis, currently in A paced rhythm  4. SSS- pacer in place [address]  5. Hx of 3rd degree heart block- pacer in place. Follow with pacer clinic. 6. HTN- has been low, see above. 7. Obesity - Chronic. Encouraged diet, exercise, and discussed weight loss extensively. 6. Former Tobacco abuse. Chronic. Discussed extensively and gave options to help with quitting. 9. Hyperlipidemia- Chronic. As above. LDL goal < 70. Optimize therapy. 10. Reviewed [facility] records. Has EF 55% on last echo with them. Thank you for allowing me to participate in the care of this patient, please do not hesitate to call if you have any questions. Linden David, [id number] [facility]  [facility]. [facility]  [phone]

## 2020-06-02 ENCOUNTER — OFFICE VISIT (OUTPATIENT)
Dept: FAMILY MEDICINE CLINIC | Age: 75
End: 2020-06-02
Payer: MEDICARE

## 2020-06-02 VITALS
WEIGHT: 233 LBS | SYSTOLIC BLOOD PRESSURE: 134 MMHG | HEART RATE: 69 BPM | DIASTOLIC BLOOD PRESSURE: 76 MMHG | BODY MASS INDEX: 32.5 KG/M2 | OXYGEN SATURATION: 98 %

## 2020-06-02 PROBLEM — F33.0 DEPRESSION, MAJOR, RECURRENT, MILD (HCC): Status: ACTIVE | Noted: 2020-06-02

## 2020-06-02 PROCEDURE — 99214 OFFICE O/P EST MOD 30 MIN: CPT | Performed by: NURSE PRACTITIONER

## 2020-06-02 RX ORDER — CITALOPRAM 10 MG/1
10 TABLET ORAL DAILY
Qty: 30 TABLET | Refills: 5 | Status: SHIPPED | OUTPATIENT
Start: 2020-06-02 | End: 2020-12-07 | Stop reason: SDUPTHER

## 2020-06-02 ASSESSMENT — ENCOUNTER SYMPTOMS
COUGH: 0
WHEEZING: 0
SHORTNESS OF BREATH: 0
ABDOMINAL PAIN: 0

## 2020-06-02 ASSESSMENT — PATIENT HEALTH QUESTIONNAIRE - PHQ9
SUM OF ALL RESPONSES TO PHQ9 QUESTIONS 1 & 2: 0
SUM OF ALL RESPONSES TO PHQ9 QUESTIONS 1 & 2: 2
SUM OF ALL RESPONSES TO PHQ QUESTIONS 1-9: 0
2. FEELING DOWN, DEPRESSED OR HOPELESS: 0
SUM OF ALL RESPONSES TO PHQ QUESTIONS 1-9: 2
SUM OF ALL RESPONSES TO PHQ QUESTIONS 1-9: 2
2. FEELING DOWN, DEPRESSED OR HOPELESS: 1
SUM OF ALL RESPONSES TO PHQ QUESTIONS 1-9: 0
1. LITTLE INTEREST OR PLEASURE IN DOING THINGS: 1
1. LITTLE INTEREST OR PLEASURE IN DOING THINGS: 0

## 2020-06-02 NOTE — PROGRESS NOTES
[address]  [address]  Dept: [phone]  Dept Fax: [phone]  Loc: [phone]     Visit Date:  6/2/2020    Patient:  Kirstin Carvalho  YOB: 1945    HPI:     Chief Complaint   Patient presents with    6 Month Follow-Up     pt here to get est feeling good, has been having some days where gets a little down when he has nothing to do    Hypertension    Hyperlipidemia       HPI   Mood states he feels more depressed after getting pacemaker placed or sick sinus rhythm. Describes lack of motivation. No history of suicidal ideations. Just more down and open to trying an antidepressant low-dose. Does not think he is more forgetful than normal however wishes to continue Aricept. Wife helps with history and is very abrasive and argumentative  HTN/HLP sees cardiology Dr Beverly Blackburn and taking meds as prescribed  Medications  Prior to Visit Medications    Medication Sig Taking? Authorizing Provider   citalopram (CELEXA) 10 MG tablet Take 1 tablet by mouth daily Yes ELEUTERIO Uribe - CNP   MYRBETRIQ 25 MG TB24 take 1 tablet by mouth once daily Yes Ambar Martinez MD   donepezil (ARICEPT) 5 MG tablet take 1 tablet by mouth every evening Yes Ambar Martinez MD   Glucosamine-Chondroit-Vit C-Mn (GLUCOSAMINE 1500 COMPLEX PO) Take by mouth Yes Historical Provider, MD   CPAP Machine MISC by Does not apply route Pressure of 13 Yes Historical Provider, MD   vitamin B-12 (CYANOCOBALAMIN) 1000 MCG tablet Take 1,000 mcg by mouth daily Yes Historical Provider, MD   ELIQUIS 5 MG TABS tablet Take 5 mg by mouth 2 times daily  Yes Historical Provider, MD   Coenzyme Q10 10 MG CAPS Take 1 capsule by mouth daily Yes Historical Provider, MD        The patient is allergic to pravastatin.     Past Medical History  Sveta Stanley  has a past medical history of Diverticulosis, Hyperlipidemia, Hyperuricemia, Obesity, Osteoarthrosis, Overactive bladder, Prostate cancer ([facility] [address] and Third degree heart block ([facility] [address] Past Surgical History  The patient  has a past surgical history that includes sigmoidoscopy (1998, 2000); Fibula Fracture Surgery (Right, 08/2000); Colonoscopy (01/2007); Prostate biopsy (07/10/2008); Hip Arthroplasty (Bilateral, 04/12,09/12); Rotator cuff repair (Right, 10/2013); A-V cardiac pacemaker insertion; pacemaker placement (08/27/2017); Prostatectomy (2008); and Cystocopy (08/2019). Family History  This patient's family history includes Alzheimer's Disease in his mother; Emphysema in his father; Other in his father. Social History  Nolan Bhatia  reports that he quit smoking about 38 years ago. He has a 30.00 pack-year smoking history. He has never used smokeless tobacco. He reports that he does not drink alcohol or use drugs. Health Maintenance:    Health Maintenance   Topic Date Due    DTaP/Tdap/Td vaccine (1 - Tdap) 10/05/1964    Shingles Vaccine (2 of 3) 01/18/2010    Colon cancer screen colonoscopy  01/11/2017    Annual Wellness Visit (AWV)  05/29/2019    PSA counseling  05/13/2020    Potassium monitoring  12/30/2020    Creatinine monitoring  12/30/2020    Lipid screen  11/15/2024    Flu vaccine  Completed    Pneumococcal 65+ years Vaccine  Completed    AAA screen  Completed    Hepatitis C screen  Completed    Hepatitis A vaccine  Aged Out    Hepatitis B vaccine  Aged Out    Hib vaccine  Aged Out    Meningococcal (ACWY) vaccine  Aged Out       Subjective:      Review of Systems   Constitutional: Negative for chills, fatigue and fever. HENT: Negative for congestion. Respiratory: Negative for cough, shortness of breath and wheezing. Cardiovascular: Negative for chest pain, palpitations and leg swelling. Gastrointestinal: Negative for abdominal pain. Neurological: Negative for dizziness.        Objective:     /76   Pulse 69   Wt 233 lb (105.7 kg)   SpO2 98%   BMI 32.50

## 2020-07-15 ENCOUNTER — PROCEDURE VISIT (OUTPATIENT)
Dept: CARDIOLOGY | Age: 75
End: 2020-07-15
Payer: MEDICARE

## 2020-07-15 PROCEDURE — 93288 INTERROG EVL PM/LDLS PM IP: CPT | Performed by: INTERNAL MEDICINE

## 2020-07-27 NOTE — TELEPHONE ENCOUNTER
Alma Ferrara is calling to request a refill on the following medication(s):  Requested Prescriptions     Pending Prescriptions Disp Refills    donepezil (ARICEPT) 5 MG tablet 30 tablet 5     Sig: Take 1 tablet by mouth nightly       Last Visit Date (If Applicable):  Visit date not found    Next Visit Date:    Visit date not found

## 2020-07-28 RX ORDER — DONEPEZIL HYDROCHLORIDE 5 MG/1
5 TABLET, FILM COATED ORAL NIGHTLY
Qty: 30 TABLET | Refills: 5 | Status: SHIPPED | OUTPATIENT
Start: 2020-07-28 | End: 2021-01-24

## 2020-09-15 NOTE — TELEPHONE ENCOUNTER
Jeet Lora is calling to request a refill on the following medication(s):  Requested Prescriptions     Pending Prescriptions Disp Refills    mirabegron (MYRBETRIQ) 25 MG TB24 30 tablet 3     Sig: take 1 tablet by mouth once daily       Last Visit Date (If Applicable):  2/8/0786    Next Visit Date:    12/1/2020

## 2020-11-04 ENCOUNTER — OFFICE VISIT (OUTPATIENT)
Dept: PULMONOLOGY | Age: 75
End: 2020-11-04
Payer: MEDICARE

## 2020-11-04 VITALS
TEMPERATURE: 98.3 F | OXYGEN SATURATION: 97 % | WEIGHT: 233.2 LBS | DIASTOLIC BLOOD PRESSURE: 82 MMHG | HEIGHT: 71 IN | SYSTOLIC BLOOD PRESSURE: 126 MMHG | BODY MASS INDEX: 32.65 KG/M2 | HEART RATE: 75 BPM

## 2020-11-04 PROCEDURE — G8417 CALC BMI ABV UP PARAM F/U: HCPCS | Performed by: INTERNAL MEDICINE

## 2020-11-04 PROCEDURE — 1036F TOBACCO NON-USER: CPT | Performed by: INTERNAL MEDICINE

## 2020-11-04 PROCEDURE — 4040F PNEUMOC VAC/ADMIN/RCVD: CPT | Performed by: INTERNAL MEDICINE

## 2020-11-04 PROCEDURE — 3017F COLORECTAL CA SCREEN DOC REV: CPT | Performed by: INTERNAL MEDICINE

## 2020-11-04 PROCEDURE — 99213 OFFICE O/P EST LOW 20 MIN: CPT | Performed by: INTERNAL MEDICINE

## 2020-11-04 PROCEDURE — G8484 FLU IMMUNIZE NO ADMIN: HCPCS | Performed by: INTERNAL MEDICINE

## 2020-11-04 PROCEDURE — 1123F ACP DISCUSS/DSCN MKR DOCD: CPT | Performed by: INTERNAL MEDICINE

## 2020-11-04 PROCEDURE — G8427 DOCREV CUR MEDS BY ELIG CLIN: HCPCS | Performed by: INTERNAL MEDICINE

## 2020-11-04 NOTE — PROGRESS NOTES
REASON FOR FOLLOW-UP  SHONDA  HISTORY OF PRESENT ILLNESS:                Shani Bonds is a 76 y.o. old male who comes in for follow up regarding his obstructive sleep apnea. He is currently doing well using his positive airway pressure device. He is sleeping better at night with his machine and says he has less daytime sleepiness. There have been no driving issues and concentration is better when awake. The machine pressure settings are comfortable, the mask is reasonably comfortable and he is using the humidity. There have been no ER visits, hospital visits, any new issues or medication changes since the last visit here in sleep clinic. EPSS- 4     Last visit   Patient had his sleep study. His initial sleep study was done 11/26/2018 which showed a sleep efficiency of 57%. Patient spent 7% time, and REM sleep, 82% of the time was   Stage I.  . His AHI 68 events per hour. Titration sleep study done 11/28/2018 patient's sleep efficiency improved to 76%. He had improvement in rem sleep to 22% and stage I decreased to 47% at the CPAP off 14-13 cm of water pressure. Patient had no respiratory events and lowest saturation was 97%. Patient was prescribed a CPAP of 13 cm of water pressure, but they have not rate picked up CPAP machine from [facility] provider because they wanted to review it with me first.  Sleep Medicine 10/30/2019   Sitting and reading 0   Watching TV 1   Sitting, inactive in a public place (e.g. a theatre or a meeting) 0   As a passenger in a car for an hour without a break 0   Lying down to rest in the afternoon when circumstances permit 2   Sitting and talking to someone 0   Sitting quietly after a lunch without alcohol 1   In a car, while stopped for a few minutes in traffic 0   Total score 4      Last visit     here for evaluation of snoring and hypersomnia.   He is accompanied by his wife who has noted that patient at night is snoring, snorting, choking, having periods of not breathing, tossing and turning, decreased memory, decreased concentration,  falling asleep while reading, watching television, no increase in weight  , no sinus problems, no congested nose ,  denies feeling completely or partially paralyzed while falling asleep or waking up. LUNG CANCER SCREENING     1. CRITERIA MET    []     CT ORDERED  []      2. CRITERIA NOT MET   [x]      3. REFUSED                    []        REASON CRITERIA NOT MET     1. SMOKING LESS THAN 30 PY  []      2. AGE LESS THAN 55 or GREATER 77 YEARS  [x]      3. QUIT SMOKING 15 YEARS OR GREATER   []      4. RECENT CT WITH IN 11 MONTHS    []      5. LIFE EXPECTANCY < 5 YEARS   []      6.  SIGNS  AND SYMPTOMS OF LUNG CANCER   []         Immunization   Immunization History   Administered Date(s) Administered    Influenza Virus Vaccine 2014    Influenza, High Dose (Fluzone 65 yrs and older) 2017, 2018    Influenza, Triv, inactivated, subunit, adjuvanted, IM (Fluad 65 yrs and older) 10/25/2019    Pneumococcal Conjugate 13-valent ([id number]) 2015    Pneumococcal Polysaccharide ([id number]) 2017    Td, unspecified formulation 2008    Zoster Live (Zostavax) 2009        Pneumococcal Vaccine     [x] Up to date    [] Indicated   [] Refused  [] Contraindicated       Influenza Vaccine   [] Up to date    [x] Indicated   [] Refused  [] Contraindicated          PAST MEDICAL HISTORY:       Diagnosis Date    Diverticulosis     Hyperlipidemia     Low HDL    Hyperuricemia     Obesity     Osteoarthrosis     not designated as generalized or localized    Overactive bladder     Prostate cancer ([facility] [address]     Third degree heart block ([facility] [address] 2017         Family History:       Problem Relation Age of Onset    Alzheimer's Disease Mother     Emphysema Father     Other Father         vascular disease- of aneurysm       SURGICAL HISTORY:   Past Surgical History:   Procedure Laterality Date    A-V CARDIAC Earline Screen      Dr Emilio [facility]    COLONOSCOPY  01/2007    diverticulosis    CYSTOSCOPY  08/2019    with Botox    FIBULA FRACTURE SURGERY Right 08/2000    [facility]    HIP ARTHROPLASTY Bilateral 04/12,09/12    Dr Rocael Gallardo  08/27/2017    PROSTATE BIOPSY  07/10/2008    PROSTATECTOMY  2008    w/lymph node dissection Dr Hiral Coulter Right 10/2013    Dr Darcy Pedroza, 2000    [address]:      There  no history of TB or TB exposure. There  no asbestos or silica dust exposure. The patient reports  no coal, foundry, quarry or [employer] exposure. Travel history reveals no. There  no history of recreational or IV drug use. There  no hot tub exposure. Pets  no    Occupational history desk job , now retired now . TOBACCO:   reports that he quit smoking about 39 years ago. He has a 30.00 pack-year smoking history. He has never used smokeless tobacco.  ETOH:   reports no history of alcohol use. ALLERGIES:      Allergies   Allergen Reactions    Pravastatin          Home Meds:   Prior to Admission medications    Medication Sig Start Date End Date Taking?  Authorizing Provider   mirabegron (MYRBETRIQ) 25 MG TB24 take 1 tablet by mouth once daily 9/15/20  Yes ELEUTERIO Curiel CNP   donepezil (ARICEPT) 5 MG tablet Take 1 tablet by mouth nightly 7/28/20 1/26/21 Yes ELEUTERIO Curiel CNP   citalopram (CELEXA) 10 MG tablet Take 1 tablet by mouth daily 6/2/20  Yes ELEUTERIO Curiel CNP   CPAP Machine MISC by Does not apply route Pressure of 13   Yes Historical Provider, MD   vitamin B-12 (CYANOCOBALAMIN) 1000 MCG tablet Take 1,000 mcg by mouth daily   Yes Historical Provider, MD   ELIQUIS 5 MG TABS tablet Take 5 mg by mouth 2 times daily  12/22/17  Yes Historical Provider, MD   Coenzyme Q10 10 MG CAPS Take 1 capsule by mouth daily   Yes Historical Provider, MD Glucosamine-Chondroit-Vit C-Mn (GLUCOSAMINE 1500 COMPLEX PO) Take by mouth    Historical Provider, MD            Review of Systems -  General ROS: negative for - chills, fatigue, fever or weight loss  ENT ROS: negative for - headaches, oral lesions or sore throat  Cardiovascular ROS: no chest pain , orthopnea or pnd   Gastrointestinal ROS: no abdominal pain, change in bowel habits, or black or bloody stools  Skin - no rash   Neuro - no blurry vision , no loc . No focal weakness         Vitals:  /82   Pulse 75   Temp 98.3 °F (36.8 °C)   Ht 5' 11\" (1.803 m)   Wt 233 lb 3.2 oz (105.8 kg)   SpO2 97%   BMI 32.52 kg/m²     PHYSICAL EXAM:  Head and neck atraumatic, normocephalic    Lymph nodes-no cervical, supraclavicular lymphadenopathy    Neck-no JVP elevation    Lungs - Ventilating all lobes without any rales, rhonchi, wheezes or dullness. No bronchial breath sounds. Chest expansion equal bilaterally    CVS- S1, S2 regular. No S3 no S4, no murmurs    Abdomen-nontender, nondistended. Bowel sounds are present. No organomegaly    Lower extremity-no edema    Upper extremity-no edema    Neurological-grossly normal cranial nerves. No overt motor deficit               IMPRESSION:     Diagnosis Orders   1. SHONDA on CPAP     2. Severe obstructive sleep apnea     3. Essential hypertension          :                PLAN:        CPAP at least 4 hrs qhs  Wt loss is recommended and discussed  Follow good sleep hygeine instructions  Use humidifier if needed  Questions answered pertaining to diagnosis and management explained importance of compliance with therapy   Compliance data reviewed and pt is compliant per insurance requirements   1 year follow up    Requested Prescriptions      No prescriptions requested or ordered in this encounter       There are no discontinued medications.     Len Cowan received counseling on the following healthy behaviors: nutrition, exercise and medication adherence    Patient given educational materials : see patient instruction       Discussed use, benefit, and side effects of prescribed medications. Barriers to medication compliance addressed. All patient questions answered. Pt voiced understanding. I hope this updates you on my evaluation and clinical thinking. Thank you for allowing me to participate in his care. Sincerely,      Electronically signed by Ivis Pearl MD on 11/4/2020 at 2:32 PM       Please note that this chart was generated using voice recognition Dragon dictation software. Although every effort was made to ensure the accuracy of this automated transcription, some errors in transcription may have occurred.

## 2020-12-07 ENCOUNTER — OFFICE VISIT (OUTPATIENT)
Dept: FAMILY MEDICINE CLINIC | Age: 75
End: 2020-12-07
Payer: MEDICARE

## 2020-12-07 VITALS
BODY MASS INDEX: 32.62 KG/M2 | DIASTOLIC BLOOD PRESSURE: 86 MMHG | OXYGEN SATURATION: 98 % | SYSTOLIC BLOOD PRESSURE: 120 MMHG | HEIGHT: 71 IN | WEIGHT: 233 LBS | HEART RATE: 68 BPM

## 2020-12-07 PROBLEM — F03.90 DEMENTIA WITHOUT BEHAVIORAL DISTURBANCE (HCC): Status: ACTIVE | Noted: 2020-12-07

## 2020-12-07 PROCEDURE — 4040F PNEUMOC VAC/ADMIN/RCVD: CPT | Performed by: FAMILY MEDICINE

## 2020-12-07 PROCEDURE — 99211 OFF/OP EST MAY X REQ PHY/QHP: CPT | Performed by: FAMILY MEDICINE

## 2020-12-07 PROCEDURE — 1036F TOBACCO NON-USER: CPT | Performed by: FAMILY MEDICINE

## 2020-12-07 PROCEDURE — G8482 FLU IMMUNIZE ORDER/ADMIN: HCPCS | Performed by: FAMILY MEDICINE

## 2020-12-07 PROCEDURE — 1123F ACP DISCUSS/DSCN MKR DOCD: CPT | Performed by: FAMILY MEDICINE

## 2020-12-07 PROCEDURE — G8427 DOCREV CUR MEDS BY ELIG CLIN: HCPCS | Performed by: FAMILY MEDICINE

## 2020-12-07 PROCEDURE — 99214 OFFICE O/P EST MOD 30 MIN: CPT | Performed by: FAMILY MEDICINE

## 2020-12-07 PROCEDURE — G8417 CALC BMI ABV UP PARAM F/U: HCPCS | Performed by: FAMILY MEDICINE

## 2020-12-07 PROCEDURE — 3017F COLORECTAL CA SCREEN DOC REV: CPT | Performed by: FAMILY MEDICINE

## 2020-12-07 RX ORDER — CITALOPRAM 20 MG/1
20 TABLET ORAL DAILY
Qty: 30 TABLET | Refills: 5 | Status: SHIPPED | OUTPATIENT
Start: 2020-12-07 | End: 2021-06-13

## 2020-12-07 RX ORDER — OXYBUTYNIN CHLORIDE 5 MG/1
TABLET, EXTENDED RELEASE ORAL
COMMUNITY
Start: 2020-11-13

## 2020-12-07 ASSESSMENT — ENCOUNTER SYMPTOMS: SHORTNESS OF BREATH: 0

## 2020-12-07 NOTE — PROGRESS NOTES
[address] [facility]  Dept: [phone]  Dept Fax:[phone]    Jamil Morin is a 76 y.o. male who presents today for his medical conditions/complaints as noted below. Jamil Morin is c/o of Hypertension and Establish Care      HPI:     HPI      Pt here to get established, prior pt of Dr Lisette Garcia prior to snf  Here for follow up of sleep apnea, dementia, HTN and Hyperlipidemia, prostate cancer history, incontinence, depression, afib    Taking all medications regularly  No side effects noted    other complaint currently with Dr Gina Shea for incontinence with medication begun oxybutinin and myrebertriq  Feeling much better urine now. [address] [facility] cardiology and pulmonology. No recent prostate issues  [facility]. Memory issues after pacemaker in 2017. BP Readings from Last 3 Encounters:   12/07/20 120/86   11/04/20 126/82   06/02/20 134/76          (goal 120/80)    Past Medical History:   Diagnosis Date    Diverticulosis     Hyperlipidemia     Low HDL    Hyperuricemia     Obesity     Osteoarthrosis     not designated as generalized or localized    Overactive bladder     Prostate cancer ([address] 2008    Third degree heart block ([address] 08/2017      Past Surgical History:   Procedure Laterality Date    A-V Jayshree Mor      Dr Nellie Machuca;  [facility]    COLONOSCOPY  01/2007    diverticulosis    CYSTOSCOPY  08/2019    with Botox    FIBULA FRACTURE SURGERY Right 08/2000    [facility] [place] [facility]    HIP ARTHROPLASTY Bilateral 04/12,09/12    Dr Karyle Pitcher  08/27/2017    PROSTATE BIOPSY  07/10/2008    PROSTATECTOMY  2008    w/lymph node dissection Dr Bakari Molina Right 10/2013    Dr Jose Pulliam, 2000    Flexible       Family History   Problem Relation Age of Onset   [facility] Alzheimer's Disease Mother     Emphysema Father    [facility] Other Father vascular disease- of aneurysm       Social History     Tobacco Use    Smoking status: Former Smoker     Packs/day: 2.00     Years: 15.00     Pack years: 30.00     Last attempt to quit: 10/18/1981     Years since quittin.1    Smokeless tobacco: Never Used   Substance Use Topics    Alcohol use: No      Current Outpatient Medications   Medication Sig Dispense Refill    citalopram (CELEXA) 20 MG tablet Take 1 tablet by mouth daily 30 tablet 5    mirabegron (MYRBETRIQ) 25 MG TB24 take 1 tablet by mouth once daily 30 tablet 3    donepezil (ARICEPT) 5 MG tablet Take 1 tablet by mouth nightly 30 tablet 5    Glucosamine-Chondroit-Vit C-Mn (GLUCOSAMINE 1500 COMPLEX PO) Take by mouth      CPAP Machine MISC by Does not apply route Pressure of 13      vitamin B-12 (CYANOCOBALAMIN) 1000 MCG tablet Take 1,000 mcg by mouth daily      ELIQUIS 5 MG TABS tablet Take 5 mg by mouth 2 times daily   0    Coenzyme Q10 10 MG CAPS Take 1 capsule by mouth daily      oxybutynin (DITROPAN-XL) 5 MG extended release tablet take 1 tablet by mouth once daily       No current facility-administered medications for this visit. No Active Allergies    Health Maintenance   Topic Date Due    DTaP/Tdap/Td vaccine (1 - Tdap) 10/05/1964    Shingles Vaccine (2 of 3) 2010    Colon cancer screen colonoscopy  2017    Annual Wellness Visit (AWV)  2019    PSA counseling  2020    Potassium monitoring  2020    Creatinine monitoring  2020    Lipid screen  11/15/2024    Flu vaccine  Completed    Pneumococcal 65+ years Vaccine  Completed    AAA screen  Completed    Hepatitis C screen  Completed    Hepatitis A vaccine  Aged Out    Hepatitis B vaccine  Aged Out    Hib vaccine  Aged Out    Meningococcal (ACWY) vaccine  Aged Out       Subjective:      Review of Systems   Constitutional: Negative for fatigue. Eyes: Negative for visual disturbance. Respiratory: Negative for shortness of breath. Cardiovascular: Negative for chest pain, palpitations and leg swelling. Genitourinary: Negative for frequency. Neurological: Negative for dizziness. Goes to therapy for balance issues. Home BP Checks?no  Medication Compliant? yes    Objective:     /86 (Site: Left Upper Arm, Position: Sitting, Cuff Size: Large Adult)   Pulse 68   Ht 5' 11\" (1.803 m)   Wt 233 lb (105.7 kg)   SpO2 98%   BMI 32.50 kg/m²   Physical Exam  Constitutional:       General: He is not in acute distress. Appearance: He is well-developed. HENT:      Head: Atraumatic. Eyes:      Conjunctiva/sclera: Conjunctivae normal.   Neck:      Musculoskeletal: Neck supple. Thyroid: No thyromegaly. Vascular: No carotid bruit. Cardiovascular:      Rate and Rhythm: Normal rate and regular rhythm. Heart sounds: No murmur. Pulmonary:      Effort: Pulmonary effort is normal.      Breath sounds: Normal breath sounds. Abdominal:      General: Bowel sounds are normal.      Palpations: Abdomen is soft. Musculoskeletal:         General: No swelling (BLE). Right lower leg: He exhibits no swelling. Left lower leg: He exhibits no swelling. Lymphadenopathy:      Cervical: No cervical adenopathy. Neurological:      Mental Status: He is alert and oriented to person, place, and time. Psychiatric:         Thought Content: Thought content normal.         Judgment: Judgment normal.         Assessment:      1. Essential hypertension    2. Dementia without behavioral disturbance, unspecified dementia type ([address]    3. Malignant neoplasm of prostate ([address]    4. Urinary incontinence, unspecified type    5. SHONDA treated with BiPAP    6. Depression, major, recurrent, mild (HCC)    7. Paroxysmal atrial fibrillation ([address]    8. Mixed hyperlipidemia             Plan:     There are no Patient Instructions on file for this visit. No orders of the defined types were placed in this encounter.     Orders Placed This Encounter   Medications    citalopram (CELEXA) 20 MG tablet     Sig: Take 1 tablet by mouth daily     Dispense:  30 tablet     Refill:  5        Return in about 4 months (around 4/7/2021) for mood, HTN afib, lipid. Discussed use, benefit, and side effects of prescribed medications. All patient questions answered. Pt voiced understanding. Instructed to continue current medications, diet and exercise. Patient agreed with treatment plan. Follow up as directed.      Electronically signedby Khushbu Guallpa MD on 12/7/2020

## 2021-01-18 DIAGNOSIS — R32 URINARY INCONTINENCE, UNSPECIFIED TYPE: ICD-10-CM

## 2021-01-19 NOTE — TELEPHONE ENCOUNTER
Yaw Iqbal is calling to request a refill on the following medication(s):  Requested Prescriptions     Pending Prescriptions Disp Refills    mirabegron (MYRBETRIQ) 25 MG TB24 [Pharmacy Med Name: Jackelin Horton ER 25 MG TABLET] 30 tablet 3     Sig: take 1 tablet by mouth once daily       Last Visit Date (If Applicable):  47/7/94    Next Visit Date:    4/7/21

## 2021-01-20 ENCOUNTER — PROCEDURE VISIT (OUTPATIENT)
Dept: CARDIOLOGY | Age: 76
End: 2021-01-20
Payer: MEDICARE

## 2021-01-20 DIAGNOSIS — I49.5 SICK SINUS SYNDROME (HCC): ICD-10-CM

## 2021-01-20 DIAGNOSIS — Z95.0 CARDIAC PACEMAKER IN SITU: ICD-10-CM

## 2021-01-20 PROCEDURE — 93288 INTERROG EVL PM/LDLS PM IP: CPT | Performed by: INTERNAL MEDICINE

## 2021-04-07 ENCOUNTER — HOSPITAL ENCOUNTER (OUTPATIENT)
Age: 76
Setting detail: SPECIMEN
Discharge: HOME | End: 2021-04-07
Payer: MEDICARE

## 2021-04-07 ENCOUNTER — OFFICE VISIT (OUTPATIENT)
Dept: FAMILY MEDICINE CLINIC | Age: 76
End: 2021-04-07
Payer: MEDICARE

## 2021-04-07 VITALS
SYSTOLIC BLOOD PRESSURE: 126 MMHG | OXYGEN SATURATION: 95 % | HEIGHT: 71 IN | DIASTOLIC BLOOD PRESSURE: 84 MMHG | WEIGHT: 231 LBS | HEART RATE: 68 BPM | BODY MASS INDEX: 32.34 KG/M2

## 2021-04-07 DIAGNOSIS — I48.0 PAROXYSMAL ATRIAL FIBRILLATION (HCC): ICD-10-CM

## 2021-04-07 DIAGNOSIS — F03.90 DEMENTIA WITHOUT BEHAVIORAL DISTURBANCE, UNSPECIFIED DEMENTIA TYPE: ICD-10-CM

## 2021-04-07 DIAGNOSIS — C61 MALIGNANT NEOPLASM OF PROSTATE (HCC): ICD-10-CM

## 2021-04-07 DIAGNOSIS — F33.0 DEPRESSION, MAJOR, RECURRENT, MILD (HCC): ICD-10-CM

## 2021-04-07 DIAGNOSIS — I10 ESSENTIAL HYPERTENSION: Primary | ICD-10-CM

## 2021-04-07 DIAGNOSIS — I10 ESSENTIAL HYPERTENSION: ICD-10-CM

## 2021-04-07 DIAGNOSIS — G47.33 OSA TREATED WITH BIPAP: ICD-10-CM

## 2021-04-07 LAB
ANION GAP SERPL CALCULATED.3IONS-SCNC: 7 MMOL/L (ref 9–17)
CHLORIDE BLD-SCNC: 105 MMOL/L (ref 98–107)
CO2: 30 MMOL/L (ref 20–31)
CREAT SERPL-MCNC: 0.95 MG/DL (ref 0.7–1.2)
GFR AFRICAN AMERICAN: >60 ML/MIN
GFR NON-AFRICAN AMERICAN: >60 ML/MIN
GFR SERPL CREATININE-BSD FRML MDRD: NORMAL ML/MIN/{1.73_M2}
GFR SERPL CREATININE-BSD FRML MDRD: NORMAL ML/MIN/{1.73_M2}
POTASSIUM SERPL-SCNC: 4.6 MMOL/L (ref 3.7–5.3)
SODIUM BLD-SCNC: 142 MMOL/L (ref 135–144)

## 2021-04-07 PROCEDURE — 80051 ELECTROLYTE PANEL: CPT

## 2021-04-07 PROCEDURE — 36415 COLL VENOUS BLD VENIPUNCTURE: CPT | Performed by: FAMILY MEDICINE

## 2021-04-07 PROCEDURE — G8427 DOCREV CUR MEDS BY ELIG CLIN: HCPCS | Performed by: FAMILY MEDICINE

## 2021-04-07 PROCEDURE — G8417 CALC BMI ABV UP PARAM F/U: HCPCS | Performed by: FAMILY MEDICINE

## 2021-04-07 PROCEDURE — 99211 OFF/OP EST MAY X REQ PHY/QHP: CPT | Performed by: FAMILY MEDICINE

## 2021-04-07 PROCEDURE — 1036F TOBACCO NON-USER: CPT | Performed by: FAMILY MEDICINE

## 2021-04-07 PROCEDURE — 84153 ASSAY OF PSA TOTAL: CPT

## 2021-04-07 PROCEDURE — 82565 ASSAY OF CREATININE: CPT

## 2021-04-07 PROCEDURE — 1123F ACP DISCUSS/DSCN MKR DOCD: CPT | Performed by: FAMILY MEDICINE

## 2021-04-07 PROCEDURE — 99214 OFFICE O/P EST MOD 30 MIN: CPT | Performed by: FAMILY MEDICINE

## 2021-04-07 PROCEDURE — 4040F PNEUMOC VAC/ADMIN/RCVD: CPT | Performed by: FAMILY MEDICINE

## 2021-04-07 PROCEDURE — 3017F COLORECTAL CA SCREEN DOC REV: CPT | Performed by: FAMILY MEDICINE

## 2021-04-07 ASSESSMENT — ENCOUNTER SYMPTOMS: SHORTNESS OF BREATH: 0

## 2021-04-07 NOTE — PROGRESS NOTES
[address]   Dept: [phone]  Dept Fax:[phone]    Marjorie Miller is a 76 y.o. male who presents today for his medical conditions/complaints as noted below. Marjorie Miller is c/o of Hypertension      HPI:     HPI  Here for follow up of sleep apnea, depression, pafib and HTN  Taking all medications regularly  No side effects noted    No other complaint currently, no mood concerns noted    BP Readings from Last 3 Encounters:   21 126/84   20 120/86   20 126/82          (goal 120/80)    Past Medical History:   Diagnosis Date    Diverticulosis     Hyperlipidemia     Low HDL    Hyperuricemia     Obesity     Osteoarthrosis     not designated as generalized or localized    Overactive bladder     Prostate cancer ([address]     Third degree heart block ([address] 2017      Past Surgical History:   Procedure Laterality Date    [facility] Gisselle Muse;  [facility]    COLONOSCOPY  2007    diverticulosis    CYSTOSCOPY  2019    with Botox    FIBULA FRACTURE SURGERY Right 2000    [facility] [place] [facility]    HIP ARTHROPLASTY Bilateral ,    Dr Oleary Peaks  2017    PROSTATE BIOPSY  07/10/2008    PROSTATECTOMY  2008    w/lymph node dissection Dr Candido Taylor Right 10/2013    Dr Kitty Branch,     Flexible       Family History   Problem Relation Age of Onset   [facility] Alzheimer's Disease Mother     Emphysema Father     Other Father         vascular disease- of aneurysm       Social History     Tobacco Use    Smoking status: Former Smoker     Packs/day: 2.00     Years: 15.00     Pack years: 30.00     Quit date: 10/18/1981     Years since quittin.4    Smokeless tobacco: Never Used   Substance Use Topics    Alcohol use: No      Current Outpatient Medications   Medication Sig Dispense Refill    donepezil (ARICEPT) 5 MG tablet take 1 tablet by mouth nightly 90 tablet 3    mirabegron (MYRBETRIQ) 25 MG TB24 take 1 tablet by mouth once daily 30 tablet 3    oxybutynin (DITROPAN-XL) 5 MG extended release tablet take 1 tablet by mouth once daily      citalopram (CELEXA) 20 MG tablet Take 1 tablet by mouth daily 30 tablet 5    Glucosamine-Chondroit-Vit C-Mn (GLUCOSAMINE 1500 COMPLEX PO) Take by mouth      CPAP Machine MISC by Does not apply route Pressure of 13      vitamin B-12 (CYANOCOBALAMIN) 1000 MCG tablet Take 1,000 mcg by mouth daily      ELIQUIS 5 MG TABS tablet Take 5 mg by mouth 2 times daily   0    Coenzyme Q10 10 MG CAPS Take 1 capsule by mouth daily       No current facility-administered medications for this visit. No Known Allergies    Health Maintenance   Topic Date Due    DTaP/Tdap/Td vaccine (1 - Tdap) 10/05/1964    Shingles Vaccine (2 of 3) 01/18/2010    Colon cancer screen colonoscopy  01/11/2017    Annual Wellness Visit (AWV)  Never done    PSA counseling  05/13/2020    Potassium monitoring  04/07/2022    Creatinine monitoring  04/07/2022    Lipid screen  11/15/2024    Flu vaccine  Completed    Pneumococcal 65+ years Vaccine  Completed    COVID-19 Vaccine  Completed    AAA screen  Completed    Hepatitis C screen  Completed    Hepatitis A vaccine  Aged Out    Hepatitis B vaccine  Aged Out    Hib vaccine  Aged Out    Meningococcal (ACWY) vaccine  Aged Out       Subjective:      Review of Systems   Constitutional: Negative for fatigue. Respiratory: Negative for shortness of breath. Cardiovascular: Negative for chest pain, palpitations and leg swelling. Genitourinary: Negative for frequency. Neurological: Positive for dizziness. Home BP Checks?no  Medication Compliant?  yes    Objective:     /84 (Site: Right Upper Arm, Position: Sitting, Cuff Size: Large Adult)   Pulse 68   Ht 5' 11\" (1.803 m)   Wt 231 lb (104.8 kg)   SpO2 95%   BMI 32.22 kg/m²   Physical Exam  Constitutional:       General: He is not in acute distress. Appearance: He is not ill-appearing or toxic-appearing. HENT:      Head: Normocephalic. Eyes:      Conjunctiva/sclera: Conjunctivae normal.   Cardiovascular:      Rate and Rhythm: Normal rate and regular rhythm. Heart sounds: No murmur. Pulmonary:      Effort: Pulmonary effort is normal. No respiratory distress. Abdominal:      General: Bowel sounds are normal.   Musculoskeletal:         General: No swelling. Neurological:      Mental Status: He is alert. Psychiatric:         Behavior: Behavior normal.         Judgment: Judgment normal.       Assessment:      1. Essential hypertension    2. Dementia without behavioral disturbance, unspecified dementia type ([address]    3. Depression, major, recurrent, mild (HCC)    4. Paroxysmal atrial fibrillation ([address]    5. Malignant neoplasm of [address]    6. SHONDA treated with BiPAP    7. BMI 32.0-32.9,adult           Plan:     Patient Instructions   Encourage shingrix vaccine documentation. Recommend screening colonoscopy for pateint if desired. Increase fluids to 64 oz daily minimum    Orders Placed This Encounter   Procedures    Electrolyte Panel     Standing Status:   Future     Number of Occurrences:   1     Standing Expiration Date:   4/7/2022    Creatinine, Serum     Standing Status:   Future     Number of Occurrences:   1     Standing Expiration Date:   4/7/2022    PSA, Diagnostic     Standing Status:   Future     Number of Occurrences:   1     Standing Expiration Date:   4/7/2022     No orders of the defined types were placed in this encounter. Return in about 6 months (around 10/7/2021) for HTN, may have as wellness visit. Discussed use, benefit, and side effects of prescribed medications. All patient questions answered. Pt voiced understanding. Reviewed health maintenance colon cancer screen reviewed. Instructed to continue current medications, diet and exercise. Patient agreed with treatment plan. Follow up as directed.      Electronically signedby Mindi Leiva MD on 4/7/2021

## 2021-04-07 NOTE — PATIENT INSTRUCTIONS
Encourage shingrix vaccine documentation. Recommend screening colonoscopy for pateint if desired.   Increase fluids to 64 oz daily minimum

## 2021-04-08 LAB — PROSTATE SPECIFIC ANTIGEN: <0.01 UG/L

## 2021-06-11 DIAGNOSIS — F33.0 DEPRESSION, MAJOR, RECURRENT, MILD (HCC): ICD-10-CM

## 2021-06-13 RX ORDER — CITALOPRAM 20 MG/1
TABLET ORAL
Qty: 30 TABLET | Refills: 5 | Status: SHIPPED | OUTPATIENT
Start: 2021-06-13 | End: 2021-11-08

## 2021-07-02 ENCOUNTER — TELEPHONE (OUTPATIENT)
Dept: FAMILY MEDICINE CLINIC | Age: 76
End: 2021-07-02

## 2021-07-02 NOTE — TELEPHONE ENCOUNTER
----- Message from Johnmoustapha Oris sent at 7/2/2021 10:57 AM EDT -----  Subject: Message to Provider    QUESTIONS  Information for Provider? patient wife Evin Luna called in to verify   regarding a letter they received from their insurance [employer], it is   about a recall of the CPAP machine that the patient has been using, per   the wife she let him stopped using it due tot hat letter unless otherwise   advised to continue using by patient's pcp. please call them for any   advise.  ---------------------------------------------------------------------------  --------------  CALL BACK INFO  What is the best way for the office to contact you? OK to leave message on   voicemail  Preferred Call Back Phone Number? [phone]  ---------------------------------------------------------------------------  --------------  SCRIPT ANSWERS  Relationship to Patient? Other  Representative Name? Latrice - spouse  Is the Representative on the appropriate HIPAA document in Epic?  Yes

## 2021-07-21 ENCOUNTER — PROCEDURE VISIT (OUTPATIENT)
Dept: CARDIOLOGY | Age: 76
End: 2021-07-21
Payer: MEDICARE

## 2021-07-21 DIAGNOSIS — I49.5 SICK SINUS SYNDROME (HCC): ICD-10-CM

## 2021-07-21 DIAGNOSIS — Z95.0 CARDIAC PACEMAKER IN SITU: ICD-10-CM

## 2021-07-21 PROCEDURE — 93288 INTERROG EVL PM/LDLS PM IP: CPT | Performed by: INTERNAL MEDICINE

## 2021-10-13 ENCOUNTER — OFFICE VISIT (OUTPATIENT)
Dept: FAMILY MEDICINE CLINIC | Age: 76
End: 2021-10-13
Payer: MEDICARE

## 2021-10-13 VITALS
HEART RATE: 58 BPM | DIASTOLIC BLOOD PRESSURE: 74 MMHG | OXYGEN SATURATION: 97 % | HEIGHT: 69 IN | WEIGHT: 221.6 LBS | SYSTOLIC BLOOD PRESSURE: 102 MMHG | BODY MASS INDEX: 32.82 KG/M2

## 2021-10-13 DIAGNOSIS — Z23 NEED FOR TDAP VACCINATION: ICD-10-CM

## 2021-10-13 DIAGNOSIS — Z23 NEED FOR INFLUENZA VACCINATION: ICD-10-CM

## 2021-10-13 DIAGNOSIS — I48.0 PAROXYSMAL ATRIAL FIBRILLATION (HCC): ICD-10-CM

## 2021-10-13 DIAGNOSIS — C61 MALIGNANT NEOPLASM OF PROSTATE (HCC): ICD-10-CM

## 2021-10-13 DIAGNOSIS — G30.1 LATE ONSET ALZHEIMER'S DEMENTIA WITHOUT BEHAVIORAL DISTURBANCE (HCC): ICD-10-CM

## 2021-10-13 DIAGNOSIS — Z00.00 ROUTINE GENERAL MEDICAL EXAMINATION AT A HEALTH CARE FACILITY: Primary | ICD-10-CM

## 2021-10-13 DIAGNOSIS — I10 ESSENTIAL HYPERTENSION: ICD-10-CM

## 2021-10-13 DIAGNOSIS — E78.2 MIXED HYPERLIPIDEMIA: ICD-10-CM

## 2021-10-13 DIAGNOSIS — F02.80 LATE ONSET ALZHEIMER'S DEMENTIA WITHOUT BEHAVIORAL DISTURBANCE (HCC): ICD-10-CM

## 2021-10-13 PROCEDURE — G0463 HOSPITAL OUTPT CLINIC VISIT: HCPCS | Performed by: FAMILY MEDICINE

## 2021-10-13 PROCEDURE — G0438 PPPS, INITIAL VISIT: HCPCS | Performed by: FAMILY MEDICINE

## 2021-10-13 PROCEDURE — 4040F PNEUMOC VAC/ADMIN/RCVD: CPT | Performed by: FAMILY MEDICINE

## 2021-10-13 PROCEDURE — 90662 IIV NO PRSV INCREASED AG IM: CPT | Performed by: FAMILY MEDICINE

## 2021-10-13 PROCEDURE — PBSHW TDAP (AGE 10Y AND OLDER) IM (BOOSTRIX): Performed by: FAMILY MEDICINE

## 2021-10-13 PROCEDURE — PBSHW INFLUENZA, HIGH-DOSE, QUADV, 65 YRS +, IM, PF, 0.7ML (FLUZONE): Performed by: FAMILY MEDICINE

## 2021-10-13 PROCEDURE — G8484 FLU IMMUNIZE NO ADMIN: HCPCS | Performed by: FAMILY MEDICINE

## 2021-10-13 PROCEDURE — 90715 TDAP VACCINE 7 YRS/> IM: CPT | Performed by: FAMILY MEDICINE

## 2021-10-13 PROCEDURE — 1123F ACP DISCUSS/DSCN MKR DOCD: CPT | Performed by: FAMILY MEDICINE

## 2021-10-13 PROCEDURE — 99397 PER PM REEVAL EST PAT 65+ YR: CPT | Performed by: FAMILY MEDICINE

## 2021-10-13 RX ORDER — MEMANTINE HYDROCHLORIDE 10 MG/1
10 TABLET ORAL 2 TIMES DAILY
Qty: 60 TABLET | Refills: 5 | Status: SHIPPED | OUTPATIENT
Start: 2021-10-13 | End: 2022-03-25 | Stop reason: SDUPTHER

## 2021-10-13 SDOH — ECONOMIC STABILITY: FOOD INSECURITY: WITHIN THE PAST 12 MONTHS, YOU WORRIED THAT YOUR FOOD WOULD RUN OUT BEFORE YOU GOT MONEY TO BUY MORE.: NEVER TRUE

## 2021-10-13 SDOH — ECONOMIC STABILITY: FOOD INSECURITY: WITHIN THE PAST 12 MONTHS, THE FOOD YOU BOUGHT JUST DIDN'T LAST AND YOU DIDN'T HAVE MONEY TO GET MORE.: NEVER TRUE

## 2021-10-13 ASSESSMENT — PATIENT HEALTH QUESTIONNAIRE - PHQ9
10. IF YOU CHECKED OFF ANY PROBLEMS, HOW DIFFICULT HAVE THESE PROBLEMS MADE IT FOR YOU TO DO YOUR WORK, TAKE CARE OF THINGS AT HOME, OR GET ALONG WITH OTHER PEOPLE: 1
2. FEELING DOWN, DEPRESSED OR HOPELESS: 2
SUM OF ALL RESPONSES TO PHQ QUESTIONS 1-9: 2
SUM OF ALL RESPONSES TO PHQ QUESTIONS 1-9: 6
7. TROUBLE CONCENTRATING ON THINGS, SUCH AS READING THE NEWSPAPER OR WATCHING TELEVISION: 0
8. MOVING OR SPEAKING SO SLOWLY THAT OTHER PEOPLE COULD HAVE NOTICED. OR THE OPPOSITE, BEING SO FIGETY OR RESTLESS THAT YOU HAVE BEEN MOVING AROUND A LOT MORE THAN USUAL: 1
9. THOUGHTS THAT YOU WOULD BE BETTER OFF DEAD, OR OF HURTING YOURSELF: 0
4. FEELING TIRED OR HAVING LITTLE ENERGY: 0
1. LITTLE INTEREST OR PLEASURE IN DOING THINGS: 2
2. FEELING DOWN, DEPRESSED OR HOPELESS: 1
SUM OF ALL RESPONSES TO PHQ9 QUESTIONS 1 & 2: 4
5. POOR APPETITE OR OVEREATING: 0
6. FEELING BAD ABOUT YOURSELF - OR THAT YOU ARE A FAILURE OR HAVE LET YOURSELF OR YOUR FAMILY DOWN: 1
SUM OF ALL RESPONSES TO PHQ QUESTIONS 1-9: 2
SUM OF ALL RESPONSES TO PHQ QUESTIONS 1-9: 2
SUM OF ALL RESPONSES TO PHQ QUESTIONS 1-9: 6
1. LITTLE INTEREST OR PLEASURE IN DOING THINGS: 1
3. TROUBLE FALLING OR STAYING ASLEEP: 0
SUM OF ALL RESPONSES TO PHQ9 QUESTIONS 1 & 2: 2
SUM OF ALL RESPONSES TO PHQ QUESTIONS 1-9: 6

## 2021-10-13 ASSESSMENT — SOCIAL DETERMINANTS OF HEALTH (SDOH): HOW HARD IS IT FOR YOU TO PAY FOR THE VERY BASICS LIKE FOOD, HOUSING, MEDICAL CARE, AND HEATING?: NOT HARD AT ALL

## 2021-10-13 ASSESSMENT — ENCOUNTER SYMPTOMS
ABDOMINAL PAIN: 0
SHORTNESS OF BREATH: 0

## 2021-10-13 ASSESSMENT — LIFESTYLE VARIABLES
AUDIT-C TOTAL SCORE: INCOMPLETE
AUDIT TOTAL SCORE: INCOMPLETE
HOW OFTEN DO YOU HAVE A DRINK CONTAINING ALCOHOL: 0
HOW OFTEN DO YOU HAVE A DRINK CONTAINING ALCOHOL: NEVER

## 2021-10-13 NOTE — PROGRESS NOTES
[place] [address] [address] [id number]  Dept: [phone]  Dept Fax: [phone]    Tae Oneill is a 68 y.o. male who presents today for his medical conditions/complaints as noted below. Tae Oneill is c/o of Medicare AWV    HPI:     HPI  Pt here for annual medicare wellness evaluation  HTN, hyperlipid    Pt taking current medications. Feeling more down. BP Readings from Last 3 Encounters:   10/13/21 102/74   21 126/84   20 120/86          (goal 120/80)    Past Medical History:   Diagnosis Date    Diverticulosis     Hyperlipidemia     Low HDL    Hyperuricemia     Obesity     Osteoarthrosis     not designated as generalized or localized    Overactive bladder     Prostate cancer ([address]     Third degree heart block ([address] 2017      Past Surgical History:   Procedure Laterality Date    [facility]      [facility]    COLONOSCOPY  2007    diverticulosis    CYSTOSCOPY  2019    with Botox    FIBUL[facility] FRACTURE [facility] Right [facility]    HIP ARTHROPLASTY Bilateral ,    Dr Syed Mendez  2017    PROSTATE BIOPSY  07/10/2008    PROSTATECTOMY  2008    w/lymph node dissection Dr Peter Khanna Right 10/2013    Dr Renee Borges,     Flexible       Family History   Problem Relation Age of Onset   [facility] Alzheimer's Disease Mother     Emphysema Father     Other Father         vascular disease- of aneurysm       Social History     Tobacco Use    Smoking status: Former Smoker     Packs/day: 2.00     Years: 15.00     Pack years: 30.00     Quit date: 10/18/1981     Years since quittin.0    Smokeless tobacco: Never Used   Substance Use Topics    Alcohol use: No      Current Outpatient Medications   Medication Sig Dispense Refill    memantine (NAMENDA) 10 MG tablet Take 1 tablet by mouth 2 times daily 60 tablet 5    donepezil (ARICEPT) 5 MG tablet take 1 tablet by mouth nightly 90 tablet 1    mirabegron (MYRBETRIQ) 25 MG TB24 take 1 tablet by mouth once daily 90 tablet 1    citalopram (CELEXA) 20 MG tablet take 1 tablet by mouth once daily 30 tablet 5    oxybutynin (DITROPAN-XL) 5 MG extended release tablet take 1 tablet by mouth once daily      Glucosamine-Chondroit-Vit C-Mn (GLUCOSAMINE 1500 COMPLEX PO) Take by mouth      CPAP Machine MISC by Does not apply route Pressure of 13      vitamin B-12 (CYANOCOBALAMIN) 1000 MCG tablet Take 1,000 mcg by mouth daily      ELIQUIS 5 MG TABS tablet Take 5 mg by mouth 2 times daily   0    Coenzyme Q10 10 MG CAPS Take 1 capsule by mouth daily       No current facility-administered medications for this visit. No Known Allergies    Health Maintenance   Topic Date Due    Annual Wellness Visit (AWV)  Never done    Potassium monitoring  04/07/2022    Creatinine monitoring  04/07/2022    PSA counseling  04/07/2022    DTaP/Tdap/Td vaccine (2 - Td or Tdap) 10/13/2031    Flu vaccine  Completed    Shingles Vaccine  Completed    Pneumococcal 65+ years Vaccine  Completed    COVID-19 Vaccine  Completed    Hepatitis C screen  Completed    Hepatitis A vaccine  Aged Out    Hepatitis B vaccine  Aged Out    Hib vaccine  Aged Out    Meningococcal (ACWY) vaccine  Aged Out       Subjective:      Review of Systems   Constitutional: Negative for fatigue. Pt wants to talk about mood being more down then usual   Eyes: Negative for visual disturbance. Respiratory: Negative for shortness of breath. Cardiovascular: Negative for chest pain, palpitations and leg swelling. Gastrointestinal: Negative for abdominal pain. Genitourinary: Negative for difficulty urinating. Neurological: Negative for dizziness and headaches. Psychiatric/Behavioral: Positive for dysphoric mood. Negative for sleep disturbance. The patient is nervous/anxious.       MINI-MENTAL STATUS EXAM range of preventive health benefits. Some of the tests and screenings are paid in full while other may be subject to a deductible, co-insurance, and/or copay. Some of these benefits include a comprehensive review of your medical history including lifestyle, illnesses that may run in your family, and various assessments and screenings as appropriate. After reviewing your medical record and screening and assessments performed today your provider may have ordered immunizations, labs, imaging, and/or referrals for you. A list of these orders (if applicable) as well as your Preventive Care list are included within your After Visit Summary for your review. Other Preventive Recommendations:    · A preventive eye exam performed by an eye specialist is recommended every 1-2 years to screen for glaucoma; cataracts, macular degeneration, and other eye disorders. · A preventive dental visit is recommended every 6 months. · Try to get at least 150 minutes of exercise per week or 10,000 steps per day on a pedometer . · Order or download the FREE \"Exercise & Physical Activity: Your Everyday Guide\" from [employer]. Call 2-377.719.4522 or search [employer] online. · You need 9624-1896 mg of calcium and 7635-0647 IU of vitamin D per day. It is possible to meet your calcium requirement with diet alone, but a vitamin D supplement is usually necessary to meet this goal.  · When exposed to the sun, use a sunscreen that protects against both UVA and UVB radiation with an SPF of 30 or greater. Reapply every 2 to 3 hours or after sweating, drying off with a towel, or swimming. · Always wear a seat belt when traveling in a car. Always wear a helmet when riding a bicycle or motorcycle.     Orders Placed This Encounter   Procedures    INFLUENZA, HIGH-DOSE, QUADV, 72 YRS +, IM, PF, 0.7ML (FLUZONE)    Tdap (age 6y and older) IM (Boostrix)     Orders Placed This Encounter   Medications  memantine (NAMENDA) 10 MG tablet     Sig: Take 1 tablet by mouth 2 times daily     Dispense:  60 tablet     Refill:  5        Return for Medicare Annual Wellness Visit in 1 year. Discussed use, benefit, and side effects of prescribed medications. All patient questions answered. Pt voiced understanding. Reviewed health maintenance. Instructed to continue current medications, diet and exercise. Patient agreed with treatment plan. Follow up as directed. Electronically signedby Yesy Holguin MD on 10/13/2021         Medicare Annual Wellness Visit  Name: Kimberlyn Brambila Date: 10/13/2021   MRN: [id number] Sex: Male   Age: 68 y.o. Ethnicity: Non- / Non    : 1945 Race: White (non-)      aSpna Rosenberg is here for Medicare AWV    Screenings for behavioral, psychosocial and functional/safety risks, and cognitive dysfunction are all negative except as indicated below. These results, as well as other patient data from the [address] form, are documented in Flowsheets linked to this Encounter. No Known Allergies      Prior to Visit Medications    Medication Sig Taking?  Authorizing Provider   memantine (NAMENDA) 10 MG tablet Take 1 tablet by mouth 2 times daily Yes Yesy Holguin MD   donepezil (ARICEPT) 5 MG tablet take 1 tablet by mouth nightly Yes Yesy Holguin MD   mirabegron (MYRBETRIQ) 25 MG TB24 take 1 tablet by mouth once daily Yes Yesy Holguin MD   citalopram (CELEXA) 20 MG tablet take 1 tablet by mouth once daily Yes Yesy Holguin MD   oxybutynin (DITROPAN-XL) 5 MG extended release tablet take 1 tablet by mouth once daily Yes Historical Provider, MD   Glucosamine-Chondroit-Vit C-Mn (GLUCOSAMINE 1500 COMPLEX PO) Take by mouth Yes Historical Provider, MD   CPAP Machine MISC by Does not apply route Pressure of 13 Yes Historical Provider, MD   vitamin B-12 (CYANOCOBALAMIN) 1000 MCG tablet Take 1,000 mcg by mouth daily Yes Historical Provider, MD   LVSHOYX 1 MG TABS tablet Take 5 mg by mouth 2 times daily  Yes Historical Provider, MD   Coenzyme Q10 10 MG CAPS Take 1 capsule by mouth daily Yes Historical Provider, MD         Past Medical History:   Diagnosis Date    Diverticulosis     Hyperlipidemia     Low HDL    Hyperuricemia     Obesity     Osteoarthrosis     not designated as generalized or localized    Overactive bladder     [facility]     Third degree heart block ([facility] 2017       Past Surgical History:   Procedure Laterality Date   [facility]rera; [facility]    COLONOSCOPY  2007    diverticulosis    CYSTOSCOPY  2019    with Botox    FIBULA FRACTURE SURGERY Right 2000    [facility]    HIP ARTHROPLASTY Bilateral ,    Dr Mccarthy Faster  2017    PROSTATE BIOPSY  07/10/2008    PROSTATECTOMY  2008    w/lymph node dissection Dr Ameya Willams Right 10/2013    Dr Kevin Armstrong,     Flexible         Family History   Problem Relation Age of Onset    Alzheimer's Disease Mother     Emphysema Father     Other Father         vascular disease- of aneurysm       CareTeam (Including outside providers/suppliers regularly involved in providing care):   Patient Care Team:  Jacqueline Norris MD as PCP - General (Family Medicine)  Jacqueline Norris MD as PCP - REHABILITATION [facility] Empaneled Provider    Wt Readings from Last 3 Encounters:   10/13/21 221 lb 9.6 oz (100.5 kg)   21 231 lb (104.8 kg)   20 233 lb (105.7 kg)     Vitals:    10/13/21 1433   BP: 102/74   Site: Right Upper Arm   Position: Sitting   Cuff Size: Medium Adult   Pulse: 58   SpO2: 97%   Weight: 221 lb 9.6 oz (100.5 kg)   Height: 5' 8.5\" (1.74 m)     Body mass index is 33.2 kg/m². Based upon direct observation of the patient, evaluation of cognition reveals recent and remote memory intact.     A&O x 3,   Heart RRR without murmur  Lungs CTAB  Abd good BS soft NT  Ext no edema  Neck supple, no thyromegaly, no adenopathy, no bruits      Patient's complete Health Risk Assessment and screening values have been reviewed and are found in Flowsheets. The following problems were reviewed today and where indicated follow up appointments were made and/or referrals ordered. Positive Risk Factor Screenings with Interventions:      Cognitive: Words recalled: 0 Words Recalled  Clock Drawing Test (CDT) Score: (!) Abnormal  Total Score Interpretation: Positive Mini-Cog  Did the patient refuse to take the cognition test?: No  Cognitive Impairment Interventions:  · Medication adjusted- added to [facility] and ACP:  General  In general, how would you say your health is?: Excellent  In the past 7 days, have you experienced any of the following?  New or Increased Pain, New or Increased Fatigue, Loneliness, Social Isolation, Stress or Anger?: None of These  Do you get the social and emotional support that you need?: Yes  Do you have a Living Will?: (!) No  Advance Directives     Power of [facility] Will ACP-Advance Directive ACP-Power of     Not on File Not on File Not on File Not on File      General Health Risk Interventions:  · copy of medical power of  to the office    Health Habits/Nutrition:  Health Habits/Nutrition  Do you exercise for at least 20 minutes 2-3 times per week?: Yes  Have you lost any weight without trying in the past 3 months?: No  Do you eat only one meal per day?: No  Have you seen the dentist within the past year?: Yes  Body mass index: (!) 33.2  Health Habits/Nutrition Interventions:  · continue good diet    Hearing/Vision:  No exam data present  Hearing/Vision  Do you or your family notice any trouble with your hearing that hasn't been managed with hearing aids?: No  Do you have difficulty driving, watching TV, or doing any of your daily activities because of your eyesight?: (!) Yes  Have you had an eye exam within the past year?: Appointment is scheduled  Hearing/Vision Interventions:  · Vision concerns:  patient encouraged to make appointment with his/her eye specialist      Personalized Preventive Plan   Current Health Maintenance Status  Immunization History   Administered Date(s) Administered    COVID-19, Muhammad Peter, PF, 30mcg/0.3mL 02/04/2021, 02/25/2021    Hepatitis A Adult (Havrix, Vaqta) 07/12/2018, 01/16/2019    Influenza A ([id number]) Vaccine PF IM 11/24/2009    Influenza Virus Vaccine 01/12/2014, 04/04/2017    Influenza Whole 09/08/2009, 10/18/2010, 09/29/2011    Influenza, High Dose (Fluzone 65 yrs and older) 10/07/2015, 09/21/2016, 09/26/2017, 09/24/2018, 10/26/2020    Influenza, High-dose, Carmita Crass, 65 yrs +, IM (Fluzone) 10/13/2021    Influenza, Quadv, adjuvanted, 65 yrs +, IM, PF (Fluad) 11/20/2020    Influenza, Triv, inactivated, subunit, adjuvanted, IM (Fluad 65 yrs and older) 10/25/2019    Pneumococcal Conjugate 13-valent ([id number]) 07/14/2015    Pneumococcal Polysaccharide ([id number]) 01/16/2017, 04/04/2017    Td vaccine (adult) 08/20/2008    Td, unspecified formulation 08/20/2008    Tdap (Boostrix, Adacel) 10/13/2021    Zoster Live (Zostavax) 07/22/2009, 11/23/2009    Zoster Recombinant (Shingrix) 09/01/2019, 10/01/2020        Health Maintenance   Topic Date Due    Annual Wellness Visit (AWV)  Never done    Potassium monitoring  04/07/2022    Creatinine monitoring  04/07/2022    PSA counseling  04/07/2022    DTaP/Tdap/Td vaccine (2 - Td or Tdap) 10/13/2031    Flu vaccine  Completed    Shingles Vaccine  Completed    Pneumococcal 65+ years Vaccine  Completed    COVID-19 Vaccine  Completed    Hepatitis C screen  Completed    Hepatitis A vaccine  Aged Out    Hepatitis B vaccine  Aged Out    Hib vaccine  Aged Out    Meningococcal (ACWY) vaccine  Aged Out     Recommendations for [employer] Due: see orders and patient instructions/AVS.  .   Recommended screening schedule for the next 5-10 years is provided to the patient in written form: see Patient Instructions/AVS.    Johnson Magana was seen today for medicare aw. Diagnoses and all orders for this visit:    Need for influenza vaccination  -     INFLUENZA, HIGH-DOSE, QUADV, 72 YRS +, IM, PF, 0.7ML (FLUZONE)    Essential hypertension    Paroxysmal atrial fibrillation (HCC)    Malignant neoplasm of [facility] ([facility]    Mixed hyperlipidemia    Late onset Alzheimer's dementia without behavioral disturbance (HCC)  -     memantine (NAMENDA) 10 MG tablet;  Take 1 tablet by mouth 2 times daily    Need for Tdap vaccination  -     Tdap (age 6y and older) IM (Boostrix)    Routine general medical examination at a health care facility

## 2021-11-03 ENCOUNTER — OFFICE VISIT (OUTPATIENT)
Dept: PULMONOLOGY | Age: 76
End: 2021-11-03
Payer: MEDICARE

## 2021-11-03 VITALS
HEART RATE: 59 BPM | OXYGEN SATURATION: 97 % | HEIGHT: 69 IN | WEIGHT: 219 LBS | BODY MASS INDEX: 32.44 KG/M2 | DIASTOLIC BLOOD PRESSURE: 70 MMHG | SYSTOLIC BLOOD PRESSURE: 124 MMHG | TEMPERATURE: 97.3 F

## 2021-11-03 DIAGNOSIS — G47.33 SEVERE OBSTRUCTIVE SLEEP APNEA: ICD-10-CM

## 2021-11-03 DIAGNOSIS — I10 ESSENTIAL HYPERTENSION: ICD-10-CM

## 2021-11-03 DIAGNOSIS — Z99.89 OSA ON CPAP: Primary | ICD-10-CM

## 2021-11-03 DIAGNOSIS — G47.33 OSA ON CPAP: Primary | ICD-10-CM

## 2021-11-03 PROCEDURE — G8417 CALC BMI ABV UP PARAM F/U: HCPCS | Performed by: INTERNAL MEDICINE

## 2021-11-03 PROCEDURE — G8427 DOCREV CUR MEDS BY ELIG CLIN: HCPCS | Performed by: INTERNAL MEDICINE

## 2021-11-03 PROCEDURE — 1036F TOBACCO NON-USER: CPT | Performed by: INTERNAL MEDICINE

## 2021-11-03 PROCEDURE — 99214 OFFICE O/P EST MOD 30 MIN: CPT | Performed by: INTERNAL MEDICINE

## 2021-11-03 PROCEDURE — 1123F ACP DISCUSS/DSCN MKR DOCD: CPT | Performed by: INTERNAL MEDICINE

## 2021-11-03 PROCEDURE — G8484 FLU IMMUNIZE NO ADMIN: HCPCS | Performed by: INTERNAL MEDICINE

## 2021-11-03 PROCEDURE — 99213 OFFICE O/P EST LOW 20 MIN: CPT | Performed by: INTERNAL MEDICINE

## 2021-11-03 PROCEDURE — 4040F PNEUMOC VAC/ADMIN/RCVD: CPT | Performed by: INTERNAL MEDICINE

## 2021-11-04 NOTE — PROGRESS NOTES
REASON FOR FOLLOW-UP  SHONDA  HISTORY OF PRESENT ILLNESS:           Haleigh Wesley is a 68 y.o. old male who comes in for follow up regarding his obstructive sleep apnea. He is currently doing well using his positive airway pressure device. He is sleeping better at night with his machine and says he has less daytime sleepiness but feels fatigued and sometimes feels that the CPAP pressure is not enough. . There have been no driving issues and concentration is better when awake. The mask is reasonably comfortable and he is using the humidity. There have been no ER visits, hospital visits, any new issues or medication changes since the last visit here in sleep clinic. Natoma sleepiness score is 6  Last visit   Patient had his sleep study. His initial sleep study was done 11/26/2018 which showed a sleep efficiency of 57%. Patient spent 7% time, and REM sleep, 82% of the time was   Stage I.  . His AHI 68 events per hour. Titration sleep study done 11/28/2018 patient's sleep efficiency improved to 76%. He had improvement in rem sleep to 22% and stage I decreased to 47% at the CPAP off 14-13 cm of water pressure. Patient had no respiratory events and lowest saturation was 97%. Patient was prescribed a CPAP of 13 cm of water pressure, but they have not rate picked up CPAP machine from [facility] provider because they wanted to review it with me first.  Sleep Medicine 10/30/2019   Sitting and reading 0   Watching TV 1   Sitting, inactive in a public place (e.g. a theatre or a meeting) 0   As a passenger in a car for an hour without a break 0   Lying down to rest in the afternoon when circumstances permit 2   Sitting and talking to someone 0   Sitting quietly after a lunch without alcohol 1   In a car, while stopped for a few minutes in traffic 0   Total score 4      Last visit     here for evaluation of snoring and hypersomnia.   He is accompanied by his wife who has noted that patient at night is snoring, snorting, choking, having periods of not breathing, tossing and turning, decreased memory, decreased concentration,  falling asleep while reading, watching television, no increase in weight  , no sinus problems, no congested nose ,  denies feeling completely or partially paralyzed while falling asleep or waking up. LUNG CANCER SCREENING     1. CRITERIA MET    []     CT ORDERED  []      2. CRITERIA NOT MET   [x]      3. REFUSED                    []        REASON CRITERIA NOT MET     1. SMOKING LESS THAN 30 PY  []      2. AGE LESS THAN 55 or GREATER 77 YEARS  [x]      3. QUIT SMOKING 15 YEARS OR GREATER   []      4. RECENT CT WITH IN 11 MONTHS    []      5. LIFE EXPECTANCY < 5 YEARS   []      6.  SIGNS  AND SYMPTOMS OF LUNG CANCER   []         Immunization   Immunization History   Administered Date(s) Administered    COVID-19, Pfizer, PF, 30mcg/0.3mL 02/04/2021, 02/25/2021, 10/28/2021    Hepatitis A Adult (Havrix, Vaqta) 07/12/2018, 01/16/2019    Influenza A ([id number]) Vaccine PF IM 11/24/2009    Influenza Virus Vaccine 01/12/2014, 04/04/2017    Influenza Whole 09/08/2009, 10/18/2010, 09/29/2011    Influenza, High Dose (Fluzone 65 yrs and older) 10/07/2015, 09/21/2016, 09/26/2017, 09/24/2018, 10/26/2020    Influenza, High-dose, Carmita Aden, 65 yrs +, IM (Fluzone) 10/13/2021    Influenza, Quadv, adjuvanted, 65 yrs +, IM, PF (Fluad) 11/20/2020    Influenza, Triv, inactivated, subunit, adjuvanted, IM (Fluad 65 yrs and older) 10/25/2019    Pneumococcal Conjugate 13-valent ([id number]) 07/14/2015    Pneumococcal Polysaccharide ([id number]) 01/16/2017, 04/04/2017    Td vaccine (adult) 08/20/2008    Td, unspecified formulation 08/20/2008    Tdap (Boostrix, Adacel) 10/13/2021    Zoster Live (Zostavax) 07/22/2009, 11/23/2009    Zoster Recombinant (Shingrix) 09/01/2019, 10/01/2020        Pneumococcal Vaccine     [x] Up to date    [] Indicated   [] Refused  [] Contraindicated       Influenza Vaccine   [] Up to date    [x] Indicated   [] Refused  [] Contraindicated          PAST MEDICAL HISTORY:       Diagnosis Date    Diverticulosis     Hyperlipidemia     Low HDL    Hyperuricemia     Obesity     Osteoarthrosis     not designated as generalized or localized    Overactive bladder     [facility] [address] [facility]     Third degree heart block ([facility] [address] 2017         Family History:       Problem Relation Age of Onset    Alzheimer's Disease Mother     Emphysema Father     Other Father         vascular disease- of aneurysm       SURGICAL HISTORY:   Past Surgical History:   Procedure Laterality Date    Hettie Dotter      Dr Ernie Herrera; [facility]    COLONOSCOPY  2007    diverticulosis    CYSTOSCOPY  2019    with Botox    FIBULA FRACTURE SURGERY Right 2000    [facility]    HIP ARTHROPLASTY Bilateral ,    Dr Mccarthy Faster  2017    PROSTATE BIOPSY  07/10/2008    PROSTATECTOMY      w/lymph node dissection Dr Ameya Willams Right 10/2013    Dr Kevin Armstrong,     [address]:      There  no history of TB or TB exposure. There  no asbestos or silica dust exposure. The patient reports  no coal, foundry, quarry or [employer] exposure. Travel history reveals no. There  no history of recreational or IV drug use. There  no hot tub exposure. Pets  no    Occupational history desk job , now retired now . TOBACCO:   reports that he quit smoking about 40 years ago. He has a 30.00 pack-year smoking history. He has never used smokeless tobacco.  ETOH:   reports no history of alcohol use. ALLERGIES:      No Known Allergies      Home Meds:   Prior to Admission medications    Medication Sig Start Date End Date Taking?  Authorizing Provider   memantine (NAMENDA) 10 MG tablet Take 1 tablet by mouth 2 times daily 10/13/21  Yes Jacqueline Norris MD   donepezil (ARICEPT) 5 MG

## 2021-11-08 DIAGNOSIS — F33.0 DEPRESSION, MAJOR, RECURRENT, MILD (HCC): ICD-10-CM

## 2021-11-08 RX ORDER — CITALOPRAM 20 MG/1
TABLET ORAL
Qty: 30 TABLET | Refills: 5 | Status: SHIPPED | OUTPATIENT
Start: 2021-11-08 | End: 2022-06-02 | Stop reason: SDUPTHER

## 2021-11-08 NOTE — TELEPHONE ENCOUNTER
Sapna Rosenberg is requesting a refill on the following medication(s):  Requested Prescriptions     Pending Prescriptions Disp Refills    citalopram (CELEXA) 20 MG tablet [Pharmacy Med Name: CITALOPRAM HBR 20 MG TABLET] 30 tablet 5     Sig: take 1 tablet by mouth once daily       Last Visit Date (If Applicable):  47/6/9315    Next Visit Date:    Visit date not found

## 2021-11-12 PROBLEM — Z00.00 ROUTINE GENERAL MEDICAL EXAMINATION AT A HEALTH CARE FACILITY: Status: RESOLVED | Noted: 2021-10-13 | Resolved: 2021-11-12

## 2022-01-19 ENCOUNTER — OFFICE VISIT (OUTPATIENT)
Dept: FAMILY MEDICINE CLINIC | Age: 77
End: 2022-01-19
Payer: MEDICARE

## 2022-01-19 VITALS
TEMPERATURE: 98.2 F | DIASTOLIC BLOOD PRESSURE: 76 MMHG | RESPIRATION RATE: 16 BRPM | SYSTOLIC BLOOD PRESSURE: 122 MMHG | OXYGEN SATURATION: 97 % | WEIGHT: 210 LBS | HEART RATE: 62 BPM | BODY MASS INDEX: 31.47 KG/M2

## 2022-01-19 DIAGNOSIS — E78.2 MIXED HYPERLIPIDEMIA: ICD-10-CM

## 2022-01-19 DIAGNOSIS — F03.91 DEMENTIA WITH BEHAVIORAL DISTURBANCE, UNSPECIFIED DEMENTIA TYPE: ICD-10-CM

## 2022-01-19 DIAGNOSIS — I10 ESSENTIAL HYPERTENSION: Primary | ICD-10-CM

## 2022-01-19 DIAGNOSIS — Z00.00 ANNUAL PHYSICAL EXAM: ICD-10-CM

## 2022-01-19 PROCEDURE — 99212 OFFICE O/P EST SF 10 MIN: CPT

## 2022-01-19 PROCEDURE — 99214 OFFICE O/P EST MOD 30 MIN: CPT | Performed by: FAMILY MEDICINE

## 2022-01-19 ASSESSMENT — ENCOUNTER SYMPTOMS
SHORTNESS OF BREATH: 0
ABDOMINAL PAIN: 0

## 2022-01-19 NOTE — PATIENT INSTRUCTIONS
Nutrition Health Education:    Keep hydrated, walk 30 minutes minimum 3 times weekly as tolerated. Diet should consist of low fat, low sodium and high fiber. Nutritious foods such as fruits (if you're not diabetic), vegetables, lean meats, lean dairy, whole grains such as brown rice, quinoa, and dry beans. [facility] [place] with small amounts of heart healthy extra virgin olive oil. Be watchful of any extra salt/sugar/seasoning to your food. You should eat no more than 2 grams or 2,000 mg of salt daily. Salt will raise your BP. Avoid regular/diet sodas, caffeine, energy drinks as these are full of artificial ingredients/sweeteners, sugar, salt and chemicals that spike insulin and are harmful to your health. Sugar intake increases metabolic disfunction, type 2 diabetes, insulin resistance, addictive food behavior and obesity. Avoid all processed foods, foods from boxes, cans, microwave meals as these contain high salt, sugar or fat content and not much nutrition. Get at least 8 hrs of sleep every night and turn off all electronics at least 1 hour before bedtime as these decreases melatonin production and increases wakefulness. If your cholesterol is high, no greasy, fried, fast or fatty foods. Decrease red meat intake. No butter, fisher, lard or creams, no milk as these things clog your arteries and leads to heart attacks and death. If you smoke, smoking increases risk of lung disease, cancers, high BP, heart attack, stroke and death. Take your daily medications as prescribed and inform your family doctor if you are having any side effects or issues taking medications.  I know you use a walker so ignore what you can not do  Elevated Cholesterol:   No greasy, fried, fast, fatty foods   No saturated fats   Decreased red meat intake to once every 2 months   No butter, fisher nor cream cheese   No egg yolk   NO milk   Decrease your cholesterol in diet

## 2022-01-19 NOTE — PROGRESS NOTES
Name: Naomi Shultz  DOS: 2022  MRN: [id number]      Subjective:  Naomi Shultz is a 68 y.o. male being seen for   Chief Complaint   Patient presents with   Orville Echeverria New Doctor     patient is here today to establish    Hypertension    Mood Swings     Jorge Alberto Jordan does take citalapram for his mood but it does not seem to help he is still having terrible outburts       Vitals:    22 1121   BP: 122/76   Pulse: 62   Resp: 16   Temp: 98.2 °F (36.8 °C)   SpO2: 97%     Allergies   Allergen Reactions    Pravastatin      Past Medical History:   Diagnosis Date    Diverticulosis     Hyperlipidemia     Low HDL    Hyperuricemia     Obesity     Osteoarthrosis     not designated as generalized or localized    Overactive bladder     [facility]     Third degree heart block ([facility] 2017     Past Surgical History:   Procedure Laterality Date   Genia Bergeron;  [facility]    COLONOSCOPY  2007    diverticulosis    CYSTOSCOPY  2019    with Botox    FIBULA FRACTURE SURGERY Right 2000    [facility]    HIP ARTHROPLASTY Bilateral ,    Dr Elissa Loco  2017    PROSTATE BIOPSY  07/10/2008    PROSTATECTOMY  2008    w/lymph node dissection Dr Adams Ripa Right 10/2013    Dr Shell Moreno, 2000    Flexible     Social History     Socioeconomic History    Marital status:      Spouse name: Not on file    Number of children: Not on file    Years of education: Not on file    Highest education level: Not on file   Occupational History    Not on file   Tobacco Use    Smoking status: Former Smoker     Packs/day: 2.00     Years: 15.00     Pack years: 30.00     Quit date: 10/18/1981     Years since quittin.2    Smokeless tobacco: Never Used   Substance and Sexual Activity    Alcohol use: No    Drug use: No    Sexual activity: Not on file   Other Topics Concern    Not on file Social History Narrative    Not on file     Social Determinants of Health     Financial Resource Strain: Low Risk     Difficulty of Paying Living Expenses: Not hard at all   Food Insecurity: No Food Insecurity    Worried About Running Out of Food in the Last Year: Never true    [address] in the Last Year: Never true   Transportation Needs:     Lack of Transportation (Medical): Not on file    Lack of Transportation (Non-Medical):  Not on file   Physical Activity:     Days of Exercise per Week: Not on file    Minutes of Exercise per Session: Not on file   Stress:     Feeling of Stress : Not on file   Social Connections:     Frequency of Communication with Friends and Family: Not on file    Frequency of Social Gatherings with Friends and Family: Not on file    Attends Temple Services: Not on file    Active Member of [address] [place] [address] or Organizations: Not on file    Attends Club or Organization Meetings: Not on file    Marital Status: Not on file   Intimate Partner Violence:     Fear of Current or Ex-Partner: Not on file    Emotionally Abused: Not on file    Physically Abused: Not on file    Sexually Abused: Not on file   Housing Stability:     Unable to Pay for Housing in the Last Year: Not on file    Number of [place] in the Last Year: Not on file    Unstable Housing in the Last Year: Not on file       Current Outpatient Medications   Medication Sig Dispense Refill    citalopram (CELEXA) 20 MG tablet take 1 tablet by mouth once daily 30 tablet 5    memantine (NAMENDA) 10 MG tablet Take 1 tablet by mouth 2 times daily 60 tablet 5    donepezil (ARICEPT) 5 MG tablet take 1 tablet by mouth nightly 90 tablet 1    mirabegron (MYRBETRIQ) 25 MG TB24 take 1 tablet by mouth once daily 90 tablet 1    oxybutynin (DITROPAN-XL) 5 MG extended release tablet take 1 tablet by mouth once daily      Glucosamine-Chondroit-Vit C-Mn (GLUCOSAMINE 1500 COMPLEX PO) Take by mouth      CPAP Machine MISC by Does not apply route Pressure of 13      vitamin B-12 (CYANOCOBALAMIN) 1000 MCG tablet Take 1,000 mcg by mouth daily      ELIQUIS 5 MG TABS tablet Take 5 mg by mouth 2 times daily   0    Coenzyme Q10 10 MG CAPS Take 1 capsule by mouth daily       No current facility-administered medications for this visit. Objective:  Patient is here with rey Maria at the [place] doesn't know what he specializes in. Had a pacemaker placed about 4 years ago then went into A-fib so he started on eliquis. Also had prostate cancer had his prostate removed then developed incontinence. Pt. Declines cholesterol medications and is aware that cholesterol clogs arteries which can lead to heart attack, stroke and death. Upon further questioning he uses a walker at home for balance but he left it in the car as he didn't want to come in to the office with his walker. He falls once in a while. Had fried hamburgers and fries for dinner last night, apple as well. Has a cardiologist Dr. Rito Lacey, pulmonologist Dr. Keke Biggs, Dr. Selin Stubbs urology. Hypertension  Pertinent negatives include no chest pain, palpitations or shortness of breath. Review of Systems   Constitutional: Negative for fatigue and unexpected weight change. Eyes: Negative for visual disturbance. Respiratory: Negative for shortness of breath. Cardiovascular: Negative for chest pain, palpitations and leg swelling. Gastrointestinal: Negative for abdominal pain. Genitourinary: Negative for difficulty urinating. Musculoskeletal: Positive for gait problem (is off balance so he uses a walker). Negative for arthralgias and myalgias. Skin: Negative for rash and wound. Neurological: Negative for dizziness and weakness. Psychiatric/Behavioral: Positive for behavioral problems (mood swings, gets angry per wife). Physical Exam  Constitutional:       Appearance: Normal appearance. HENT:      Head: Normocephalic and atraumatic.       Right Ear: Tympanic membrane and external ear normal.      Left Ear: Tympanic membrane and external ear normal.      Nose: Nose normal.      Mouth/Throat:      Mouth: Mucous membranes are moist.      Pharynx: Oropharynx is clear. Eyes:      Extraocular Movements: Extraocular movements intact. Conjunctiva/sclera: Conjunctivae normal.      Pupils: Pupils are equal, round, and reactive to light. Cardiovascular:      Rate and Rhythm: Normal rate and regular rhythm. Pulses: Normal pulses. Heart sounds: Normal heart sounds. No murmur heard. Comments: Patient is NOT in A-fib right now  Pulmonary:      Effort: Pulmonary effort is normal.      Breath sounds: Normal breath sounds. Abdominal:      General: Abdomen is flat. Bowel sounds are normal. There is no distension. Palpations: Abdomen is soft. There is no mass. Tenderness: There is no abdominal tenderness. There is no guarding. Musculoskeletal:         General: Normal range of motion. Cervical back: Normal range of motion and neck supple. Lymphadenopathy:      Cervical: No cervical adenopathy. Skin:     General: Skin is warm. Capillary Refill: Capillary refill takes less than 2 seconds. Neurological:      General: No focal deficit present. Mental Status: He is alert and oriented to person, place, and time. Psychiatric:         Mood and Affect: Mood normal.         Behavior: Behavior normal.         Thought Content: Thought content normal.          Assessment:   Diagnosis Orders   1. Essential hypertension  Comprehensive Metabolic Panel   2. Mixed hyperlipidemia  Lipid Panel   3. Annual physical exam  CBC Auto Differential    TSH without Reflex   4.  Dementia with behavioral disturbance, unspecified dementia type [facility])  Roel Lucero MD, Neurology, [place]         Plan:  Orders Placed This Encounter   Procedures    CBC Auto Differential     Standing Status:   Future     Standing Expiration Date:   4/18/2022   Jeet Donnelly Comprehensive Metabolic Panel     Standing Status:   Future     Standing Expiration Date:   4/18/2022    Lipid Panel     Standing Status:   Future     Standing Expiration Date:   4/18/2022     Order Specific Question:   Is Patient Fasting?/# of Hours     Answer: Yes    TSH without Reflex     Standing Status:   Future     Standing Expiration Date:   2/23/6029   Stephanie Hurley MD, Neurology, [place]     Referral Priority:   Routine     Referral Type:   Eval and Treat     Referral Reason:   Specialty Services Required     Referred to Provider:   Eden Slater MD     Requested Specialty:   Neurology     Number of Visits Requested:   1         Patient Instructions   Nutrition Health Education:    Keep hydrated, walk 30 minutes minimum 3 times weekly as tolerated. Diet should consist of low fat, low sodium and high fiber. Nutritious foods such as fruits (if you're not diabetic), vegetables, lean meats, lean dairy, whole grains such as brown rice, quinoa, and dry beans. Raynold Batters with small amounts of heart healthy extra virgin olive oil. Be watchful of any extra salt/sugar/seasoning to your food. You should eat no more than 2 grams or 2,000 mg of salt daily. Salt will raise your BP. Avoid regular/diet sodas, caffeine, energy drinks as these are full of artificial ingredients/sweeteners, sugar, salt and chemicals that spike insulin and are harmful to your health. Sugar intake increases metabolic disfunction, type 2 diabetes, insulin resistance, addictive food behavior and obesity. Avoid all processed foods, foods from boxes, cans, microwave meals as these contain high salt, sugar or fat content and not much nutrition. Get at least 8 hrs of sleep every night and turn off all electronics at least 1 hour before bedtime as these decreases melatonin production and increases wakefulness. If your cholesterol is high, no greasy, fried, fast or fatty foods. Decrease red meat intake.  No butter, fisher, lard or creams, no milk as these things clog your arteries and leads to heart attacks and death. If you smoke, smoking increases risk of lung disease, cancers, high BP, heart attack, stroke and death. Take your daily medications as prescribed and inform your family doctor if you are having any side effects or issues taking medications. I know you use a walker so ignore what you can not do  Elevated Cholesterol:   No greasy, fried, fast, fatty foods   No saturated fats   Decreased red meat intake to once every 2 months   No butter, fisher nor cream cheese   No egg yolk   NO milk   Decrease your cholesterol in diet             Return in about 2 weeks (around 2/2/2022).      Delcie Purple, DO

## 2022-01-20 DIAGNOSIS — R32 URINARY INCONTINENCE, UNSPECIFIED TYPE: ICD-10-CM

## 2022-01-28 LAB
ALBUMIN/GLOBULIN RATIO: 1.6 G/DL
ALBUMIN: 4 G/DL (ref 3.5–5)
ALP BLD-CCNC: 58 UNITS/L (ref 38–126)
ALT SERPL-CCNC: 12 UNITS/L (ref 4–50)
ANION GAP SERPL CALCULATED.3IONS-SCNC: 4.4 MMOL/L
AST SERPL-CCNC: 23 UNITS/L (ref 17–59)
BASOPHILS %: 1.34 (ref 0–3)
BASOPHILS ABSOLUTE: 0.1 (ref 0–0.3)
BILIRUB SERPL-MCNC: 0.7 MG/DL (ref 0.2–1.3)
BUN BLDV-MCNC: 16 MG/DL (ref 9–20)
CALCIUM SERPL-MCNC: 9.3 MG/DL (ref 8.4–10.2)
CHLORIDE BLD-SCNC: 106 MMOL/L (ref 98–120)
CHOLESTEROL/HDL RATIO: 4.2 RATIO (ref 0–4.5)
CHOLESTEROL: 185 MG/DL (ref 50–200)
CO2: 28 MMOL/L (ref 22–31)
CREAT SERPL-MCNC: 0.9 MG/DL (ref 0.7–1.3)
EOSINOPHILS %: 1.72 (ref 0–10)
EOSINOPHILS ABSOLUTE: 0.13 (ref 0–1.1)
GFR CALCULATED: > 60
GLOBULIN: 2.6 G/DL
GLUCOSE: 66 MG/DL (ref 75–110)
HCT VFR BLD CALC: 41.9 % (ref 42–52)
HDLC SERPL-MCNC: 44 MG/DL (ref 36–68)
HEMOGLOBIN: 14.7 (ref 13.8–17.8)
LDL CHOLESTEROL CALCULATED: 121.6 MG/DL (ref 0–160)
LYMPHOCYTE %: 24.11 (ref 20–51.1)
LYMPHOCYTES ABSOLUTE: 1.77 (ref 1–5.5)
MCH RBC QN AUTO: 30.1 PG (ref 28.5–32.5)
MCHC RBC AUTO-ENTMCNC: 35 G/DL (ref 32–37)
MCV RBC AUTO: 86 FL (ref 80–94)
MONOCYTES %: 5.29 (ref 1.7–9.3)
MONOCYTES ABSOLUTE: 0.39 (ref 0.1–1)
NEUTROPHILS %: 67.54 (ref 42.2–75.2)
NEUTROPHILS ABSOLUTE: 4.95 (ref 2–8.1)
PDW BLD-RTO: 10.5 % (ref 10–15.5)
PLATELET # BLD: 210.8 THOU/MM3 (ref 130–400)
POTASSIUM SERPL-SCNC: 4.3 MMOL/L (ref 3.6–5)
RBC: 4.88 M/UL (ref 4.7–6.1)
SODIUM BLD-SCNC: 139 MMOL/L (ref 135–145)
TOTAL PROTEIN, SERUM: 6.6 G/DL (ref 6.3–8.2)
TRIGL SERPL-MCNC: 97 MG/DL (ref 10–250)
TSH SERPL DL<=0.05 MIU/L-ACNC: 1.87 MIU/ML (ref 0.49–4.67)
VLDLC SERPL CALC-MCNC: 19 MG/DL (ref 0–50)
WBC: 7.3 THOU/ML3 (ref 4.8–10.8)

## 2022-02-07 ENCOUNTER — OFFICE VISIT (OUTPATIENT)
Dept: NEUROLOGY | Age: 77
End: 2022-02-07
Payer: MEDICARE

## 2022-02-07 VITALS
BODY MASS INDEX: 31.55 KG/M2 | DIASTOLIC BLOOD PRESSURE: 68 MMHG | SYSTOLIC BLOOD PRESSURE: 114 MMHG | WEIGHT: 213 LBS | OXYGEN SATURATION: 98 % | HEART RATE: 56 BPM | HEIGHT: 69 IN

## 2022-02-07 DIAGNOSIS — G93.89 BRAIN DYSFUNCTION: ICD-10-CM

## 2022-02-07 DIAGNOSIS — G47.33 OSA TREATED WITH BIPAP: ICD-10-CM

## 2022-02-07 DIAGNOSIS — F03.90 DEMENTIA WITHOUT BEHAVIORAL DISTURBANCE, UNSPECIFIED DEMENTIA TYPE: Primary | ICD-10-CM

## 2022-02-07 DIAGNOSIS — I49.5 SICK SINUS SYNDROME (HCC): ICD-10-CM

## 2022-02-07 DIAGNOSIS — R41.0 CONFUSION: ICD-10-CM

## 2022-02-07 DIAGNOSIS — R42 DIZZINESS: ICD-10-CM

## 2022-02-07 DIAGNOSIS — R26.89 BALANCE PROBLEM: ICD-10-CM

## 2022-02-07 DIAGNOSIS — F33.0 DEPRESSION, MAJOR, RECURRENT, MILD (HCC): ICD-10-CM

## 2022-02-07 DIAGNOSIS — I67.82 CHRONIC CEREBRAL ISCHEMIA: ICD-10-CM

## 2022-02-07 DIAGNOSIS — R26.9 GAIT DIFFICULTY: ICD-10-CM

## 2022-02-07 DIAGNOSIS — Z91.81 RISK FOR FALLS: ICD-10-CM

## 2022-02-07 DIAGNOSIS — I48.0 PAROXYSMAL ATRIAL FIBRILLATION (HCC): ICD-10-CM

## 2022-02-07 DIAGNOSIS — I10 ESSENTIAL HYPERTENSION: ICD-10-CM

## 2022-02-07 PROCEDURE — 99205 OFFICE O/P NEW HI 60 MIN: CPT | Performed by: PSYCHIATRY & NEUROLOGY

## 2022-02-07 PROCEDURE — G8417 CALC BMI ABV UP PARAM F/U: HCPCS | Performed by: PSYCHIATRY & NEUROLOGY

## 2022-02-07 PROCEDURE — 99214 OFFICE O/P EST MOD 30 MIN: CPT | Performed by: PSYCHIATRY & NEUROLOGY

## 2022-02-07 PROCEDURE — G8484 FLU IMMUNIZE NO ADMIN: HCPCS | Performed by: PSYCHIATRY & NEUROLOGY

## 2022-02-07 PROCEDURE — G8427 DOCREV CUR MEDS BY ELIG CLIN: HCPCS | Performed by: PSYCHIATRY & NEUROLOGY

## 2022-02-07 ASSESSMENT — ENCOUNTER SYMPTOMS
FACIAL SWELLING: 0
EYE DISCHARGE: 0
BOWEL INCONTINENCE: 0
SINUS PRESSURE: 0
ABDOMINAL PAIN: 0
PHOTOPHOBIA: 0
CHOKING: 0
DIARRHEA: 0
BACK PAIN: 0
COUGH: 0
VOICE CHANGE: 0
TROUBLE SWALLOWING: 0
SORE THROAT: 0
CHEST TIGHTNESS: 0
VISUAL CHANGE: 0
EYE REDNESS: 0
EYE PAIN: 0
NAUSEA: 0
EYE ITCHING: 0
VOMITING: 0
COLOR CHANGE: 0
ABDOMINAL DISTENTION: 0
BLOOD IN STOOL: 0
SHORTNESS OF BREATH: 0
CONSTIPATION: 0
APNEA: 0
WHEEZING: 0

## 2022-02-07 NOTE — PATIENT INSTRUCTIONS
* FALL   PRECAUTIONS. *  USE   WALKING  ASSISTANCE  DEVICES     QUAD  CANE  /   WALKER        *    SUPERVISED    CARE        *   ADEQUATE   FLUID  INTAKE   AND  ELECTROLYTE  BALANCE             * KEEP  DAIRY  OF   THE  NEUROLOGICAL  SYMPTOMS          *  TO  MAINTAIN  REGULAR  SLEEP  WAKE  CYCLES. *   TO  HAVE  ADEQUATE  REST  AND   SLEEP    HOURS.          *    AVOID  USAGE OF   TOBACCO,  EXCESSIVE  ALCOHOL                AND   ILLEGAL   SUBSTANCES,  IF  ANY          *  Maintain   Healthy  Life Style    With   Periodic  Monitoring  Of         Any  Medical  Conditions  Including   Elevated  Blood  Pressure,  Lipid  Profile,       Blood  Sugar levels  And   Heart  Disease. *   Period   Screening  For  Cancers  Involving  Breast,  Colon,         Lungs  And  Other  Organs  As  Applicable,           In consultation   With  Your  Primary Care Providers. *  If   There is  Any  Significant  Worsening   Of  Current  Symptoms  And             Or  If    Any additional  New  Neurological  Symptoms  and          Significant  Concerns   Should  Call  911 or  Go  To  Emergency  Department            For  Further  Immediate  Evaluation.

## 2022-02-07 NOTE — PROGRESS NOTES
[facility]  Neurology    [address] [place] [address]  XGKUU:[phone]   Fax: [phone]        SUBJECTIVE:       PATIENT ID:  Asher Urias is a  RIGHT     HANDED 68 y.o. male. Neurologic Problem  The patient's primary symptoms include clumsiness, focal sensory loss, focal weakness, a loss of balance, memory loss and weakness. The patient's pertinent negatives include no altered mental status, near-syncope, slurred speech, syncope or visual change. This is a chronic problem. Episode onset: WORSE  SINCE   3  YEARS. The problem has been gradually worsening since onset. There was no focality noted. Associated symptoms include bladder incontinence, confusion, dizziness and light-headedness. Pertinent negatives include no abdominal pain, auditory change, back pain, bowel incontinence, chest pain, diaphoresis, fatigue, fever, headaches, nausea, neck pain, palpitations, shortness of breath or vomiting. Past treatments include medication. The treatment provided mild relief. His past medical history is significant for dementia and mood changes. There is no history of a bleeding disorder, a clotting disorder, a CVA, head trauma, liver disease or seizures. History obtained from  The   PATIENT      AND   HIS  WIFE       and other  available   medical  records   were  Also  reviewed. The  Duration,  Quality,  Severity,  Location,  Timing,  Context,  Modifying  Factors   Of   The   Chief   Complaint       And  Present  Illness  Was   Reviewed   In   Chronological   Manner. PATIENT'S  MAIN  CONCERNS INCLUDE :                     1)     KNOWN     H/O    PROGRESSIVE    DEMENTIA   FOR   10   YEARS                                     AS  PER    PATIENT'S    WIFE.                            2)       WORSENING  OF   DEMENTIA ,   SPEECH  PROBLEMS                                      AND   H/O   BRIEF  CONFUSION        SINCE   2019 -    MODERATE     TO  SEVERE    DEMENTIA                          3)    PATIENT   HAS  BEEN  ON   ARICEPT   AND  NAMENDA      FOR                                     3   YEARS      AS   PER  HIS  WIFE                          4)     CHRONIC  ATRIAL  FIBRILLATION                                      -   ON  ELIQUIS                                 BEING  FOLLOWED  BY  HIS   CARDIOLOGIST                        5)     H/O   ANXIETY,   DEPRESSION                                    -   ON  CELEXA                             6)      H/O   BRIEF  DIZZINESS      INTERMITTENTLY                               WORSENED  BY  POSITIONAL  CHANGES                         7)     CHRONIC   GAIT  AND  BALANCE  PROBLEMS                              PATIENT   USES  WALKER                      8)    CO MORBID  MULTIPLE  MEDICAL  CONDITIONS                               BEING   FOLLOWED  BY    HIS  PCP      AND                                       ALSO     VA  PROVIDERS                          9)    PATIENT   HIGH  RISK  FOR     FALLS       AND    CEREBRAL ISCHEMIA                                             10)    IN  VIEW  OF  THE  PATIENT'S    MULTIPLE   NEUROLOGICAL                           SYMPTOMS  AND  CONCERNS    FOR  PROLONGED   DURATION,                           AND    MULTIPLE   CO MORBID  MEDICAL  CONDITIONS,                           PATIENT    NEEDS  NEURO  DIAGNOSTIC  EVALUATIONS                      FOR   ANY   NEUROLOGICAL  PATHOLOGIES    AND  OTHER                        CORRECTABLE   ETIOLOGIES;     AND                              PATIENT  REQUESTS   THE  SAME. 11)       VARIOUS  RISK   FACTORS   WERE  REVIEWED   AND   DISCUSSED. PATIENT   HAS  MULTIPLE   MEDICAL, NEUROLOGICAL                        AND   MENTAL HEALTH   PROBLEMS . PATIENT'S   MANAGEMENT  IS  CHALLENGING. PRECIPITATING  FACTORS: including  fever/infection, exertion/relaxation, position change, stress,                weather change,   medications/alcohol, time of day/darkness/light  Are  present                                                          MODIFYING  FACTORS:  fever/infection, exertion/relaxation, position change, stress, weather change,               medications/alcohol, time of day/darkness/light  Are  present                Patient   Indicates   The  Presence   And  The  Absence  Of  The  Following    Associated  And             Additional  Neurological    Symptoms:                                Balance  And coordination   problems  present           Gait problems     present            Headaches      absent              Migraines           absent           Memory problemspresent              Confusion        present            Paresthesia   numbness          present           Seizures  And  Starring  Episodes           absent           Syncope,  Near  syncopal episodes         absent           Speech   problems           absent             Swallowing   Problems      absent            Dizziness,  Light headedness           present              Vertigo        absent             Generalized   Weakness    present              focal  Weakness     absent             Tremors         absent              Sleep  Problems     absent             History  Of   Recent  Head  Injury     absent             History  Of   Recent  TIA     absent             History  Of   Recent    Stroke     absent             Neck  Pain   and   Neck muscle  Spasms  absent               Radiating  down   And   Weakness           absent            Lower back   Pain  And     Spasms  absent              Radiating    Down   And   Weakness          absent                H/OFALLS        absent               History  Of   Visual  Symptoms    absent                  Associated   Diplopia       absent Also   Additional   Symptoms   Present    As  Documented    In   The   detailed                  Review  Of  Systems   And    Please   Refer   To    Them for   Additional    Information. Any components  That are either  Unobtainable  Or  Limited  In   HPI, ROS  And/or PFSH   Are                   Due   ToPatient's  Medical  Problems,  Clinical  Condition   and/or lack of                                 other    Alternate   resources. RECORDS   REVIEWED:    historical medical records           INFORMATION   REVIEWED:     MEDICAL   HISTORY,SURGICAL   HISTORY,     MEDICATIONS   LIST,   ALLERGIES AND  DRUG  INTOLERANCES,       FAMILY   HISTORY,  SOCIAL  HISTORY,      PROBLEM  LIST   FOR  PATIENT  CARE   COORDINATION          Past Medical History:   Diagnosis Date    Diverticulosis     Hyperlipidemia     Low HDL    Hyperuricemia     Obesity     Osteoarthrosis     not designated as generalized or localized    Overactive bladder     Prostate cancer ([facility] [address] 2008    Third degree heart block ([facility] [address] 08/2017         Past Surgical History:   Procedure Laterality Date    A-V CARDIAC PACEMAKER INSERTION      Dr Humza Mccabe;  [facility]    COLONOSCOPY  01/2007    diverticulosis    CYSTOSCOPY  08/2019    with Botox    FIBULA FRACTURE SURGERY Right 08/2000    [facility] [place] [facility]    HIP ARTHROPLASTY Bilateral 04/12,09/12    Dr Brito Levels  08/27/2017    PROSTATE BIOPSY  07/10/2008    PROSTATECTOMY  2008    w/lymph node dissection Dr Angel Booth Right 10/2013    Dr Kodi Franco, 2000    Flexible         Current Outpatient Medications   Medication Sig Dispense Refill    mirabegron (MYRBETRIQ) 25 MG TB24 take 1 tablet by mouth once daily 90 tablet 0    citalopram (CELEXA) 20 MG tablet take 1 tablet by mouth once daily 30 tablet 5    memantine (NAMENDA) 10 MG tablet Take 1 tablet by mouth 2 times daily 60 tablet 5    donepezil (ARICEPT) 5 MG tablet take 1 tablet by mouth nightly 90 tablet 1    oxybutynin (DITROPAN-XL) 5 MG extended release tablet take 1 tablet by mouth once daily      Glucosamine-Chondroit-Vit C-Mn (GLUCOSAMINE 1500 COMPLEX PO) Take by mouth      CPAP Machine MISC by Does not apply route Pressure of 13      vitamin B-12 (CYANOCOBALAMIN) 1000 MCG tablet Take 1,000 mcg by mouth daily      ELIQUIS 5 MG TABS tablet Take 5 mg by mouth 2 times daily   0    Coenzyme Q10 10 MG CAPS Take 1 capsule by mouth daily       No current facility-administered medications for this visit. Allergies   Allergen Reactions    Pravastatin          Family History   Problem Relation Age of Onset    Alzheimer's Disease Mother     Emphysema Father     Other Father         vascular disease- of aneurysm         Social History     Socioeconomic History    Marital status:      Spouse name: Not on file    Number of children: Not on file    Years of education: Not on file    Highest education level: Not on file   Occupational History    Not on file   Tobacco Use    Smoking status: Former Smoker     Packs/day: 2.00     Years: 15.00     Pack years: 30.00     Quit date: 10/18/1981     Years since quittin.3    Smokeless tobacco: Never Used   Substance and Sexual Activity    Alcohol use: No    Drug use: No    Sexual activity: Not on file   Other Topics Concern    Not on file   Social History Narrative    Not on file     Social Determinants of Health     Financial Resource Strain: Low Risk     Difficulty of Paying Living Expenses: Not hard at all   Food Insecurity: No Food Insecurity    Worried About [address] in the Last Year: Never true    [facility] in the Last Year: Never true   Transportation Needs:     Lack of Transportation (Medical): Not on file    Lack of Transportation (Non-Medical):  Not on file   Physical Activity:     Days of Exercise per Week: Not on file    Minutes of Exercise per Session: Not on 0.0 04/16/2018    LABALBU 4.0 01/28/2022     Lab Results   Component Value Date    BUN 16 01/28/2022    CREATININE 0.9 01/28/2022     Lab Results   Component Value Date    CALCIUM 9.3 01/28/2022     Lab Results   Component Value Date    AST 23 01/28/2022    ALT 12 01/28/2022       Lab Results   Component Value Date    CKTOTAL 120 08/25/2017       Review of Systems   Constitutional: Negative for appetite change, chills, diaphoresis, fatigue, fever and unexpected weight change. HENT: Negative for congestion, dental problem, drooling, ear discharge, ear pain, facial swelling, hearing loss, mouth sores, nosebleeds, postnasal drip, sinus pressure, sore throat, tinnitus, trouble swallowing and voice change. Eyes: Negative for photophobia, pain, discharge, redness, itching and visual disturbance. Respiratory: Negative for apnea, cough, choking, chest tightness, shortness of breath and wheezing. Cardiovascular: Negative for chest pain, palpitations, leg swelling and near-syncope. Gastrointestinal: Negative for abdominal distention, abdominal pain, blood in stool, bowel incontinence, constipation, diarrhea, nausea and vomiting. Endocrine: Negative for cold intolerance, heat intolerance, polydipsia, polyphagia and polyuria. Genitourinary: Positive for bladder incontinence. Musculoskeletal: Positive for arthralgias and gait problem. Negative for back pain, joint swelling, myalgias, neck pain and neck stiffness. Skin: Negative for color change, pallor, rash and wound. Allergic/Immunologic: Negative for environmental allergies, food allergies and immunocompromised state. Neurological: Positive for dizziness, focal weakness, weakness, light-headedness, numbness and loss of balance. Negative for tremors, seizures, syncope, facial asymmetry, speech difficulty and headaches. Hematological: Negative for adenopathy. Does not bruise/bleed easily.    Psychiatric/Behavioral: Positive for confusion, decreased concentration and memory loss. Negative for agitation, behavioral problems, dysphoric mood, hallucinations, self-injury, sleep disturbance and suicidal ideas. The patient is nervous/anxious. The patient is not hyperactive. OBJECTIVE:      Physical Exam  Constitutional:       Appearance: He is well-developed. HENT:      Head: Normocephalic and atraumatic. No raccoon eyes or Cruz's sign. Right Ear: External ear normal.      Left Ear: External ear normal.      Nose: Nose normal.   Eyes:      Conjunctiva/sclera: Conjunctivae normal.      Pupils: Pupils are equal, round, and reactive to light. Neck:      Thyroid: No thyroid mass or thyromegaly. Vascular: No carotid bruit. Trachea: No tracheal deviation. Meningeal: Brudzinski's sign and Kernig's sign absent. Cardiovascular:      Rate and Rhythm: Normal rate and regular rhythm. Pulmonary:      Effort: Pulmonary effort is normal.   Musculoskeletal:         General: No tenderness. Cervical back: Normal range of motion and neck supple. No rigidity. No muscular tenderness. Normal range of motion. Skin:     General: Skin is warm. Coloration: Skin is not pale. Findings: No erythema or rash. Nails: There is no clubbing. Psychiatric:         Attention and Perception: He is attentive. Mood and Affect: Mood is anxious and depressed. Affect is not labile, blunt or inappropriate. Speech: He is communicative. Speech is delayed. Speech is not rapid and pressured, slurred or tangential.         Behavior: Behavior is slowed. Behavior is not agitated, aggressive, withdrawn, hyperactive or combative. Behavior is cooperative. Thought Content: Thought content is not paranoid or delusional. Thought content does not include homicidal or suicidal ideation. Thought content does not include homicidal or suicidal plan. Cognition and Memory: Cognition is impaired. Memory is impaired.  He exhibits impaired recent memory and impaired remote memory. NEUROLOGICALEXAMINATION :     A) MENTAL STATUS:                   Alert and  oriented  To    place  And  Person. No Aphasia. Able   To  Follow     SIMPLE    commands                     No right  To left confusion. SLOW   Speech  And language function. Insight and  Judgment ,Fund  Of  Knowledge    DECREASED                  Recent  And  Remote memory   DECREASED                  Attention &  Concentration are   DECREASED                         B) CRANIAL NERVES :        CN : Visual  Acuity  And  Visual fields    DECREASED                 Fundi  Could  Not  Be  Could  Not  Be  Evaluated. 3,4,6 CN : Both  Pupils are   Reactive and  Equal.  Movements  Are  Intact. No  Nystagmus. No  JASPER. No  Afferent  Pupillary  Defect noted. 5 CN :  Normal  Facial sensations and Corneal  Reflexes           7 CN:  Normal  Facial  Symmetry  And  Strength. No facial  Weakness. 8 CN :  Hearing  Appears   DECREASED            9, 10 CN: Normal   spontaneous, reflex   palate   movements         11 CN:   Normal  Shoulder  shrug and  strength         12 CN :   Normal  Tongue movements and  Tongue  In midline                        No tongue   Fasciculations or atrophy       C) MOTOR  EXAM:                 Strength  In upper  And  Lower   extremities   4 +/5    DECREASED                            Rapid   alternating  And  repetitions  Movements    DECREASED                   Muscle  Tone  In upper  And  Lower  Extremities  normal                No rigidity. No  Spasticity. Bradykinesia   absent                 No  Asterixis.               Sustention  Tremor , Resting   Tremor   absent                    No   other  Abnormal  Movements noted           D) SENSORY :               Light   touch, pinprick,   position  And  Vibration    DECREASED E) REFLEXES:                   Deep  Tendon  Reflexes   ABNORMAL                                     F) COORDINATION  AND  GAIT :                                Station and  Gait    SLOW  UNSTEADY                                Romberg 's test    CANNOT  BE PERFORMED                            Ataxia negative        ASSESSMENT:      Patient Active Problem List   Diagnosis    Hyperlipidemia    Essential hypertension    Malignant [facility] ([facility] [address]    Obesity    Diverticulosis of intestine without perforation or abscess without bleeding    Osteoarthritis of knee    Third degree heart block (HCC)    Sick sinus syndrome (HCC)    Paroxysmal atrial fibrillation (HCC)    Mild cognitive impairment with memory loss    SHONDA treated with BiPAP    Depression, major, recurrent, mild (HCC)    Dementia without behavioral disturbance (HCC)    BMI 32.0-32.9,adult    Gait difficulty    Balance problem    Chronic cerebral ischemia    Risk for falls    Confusion    Dizziness    Brain dysfunction       DATE OF EXAM:  08/25/2017        EXAM:  CT of the brain        CLINICAL HISTORY:  A 45-year-old male with mental status changes.        TECHNIQUE:  Axial images obtained along with multiplanar reconstructed images from the 3D data set that includes coronal and sagittal views.  Automatic exposure control techniques were used to obtain the study.  CT of the brain without contrast.       COMPARISON:  No relevant prior studies available.       FINDINGS:         Hemorrhage:  No evidence of acute hemorrhage. Brain:  Mild neurodegenerative effects of aging.  No mass, mass effect, or midline shift.     Ventricles:  No hydrocephalus.     Bones:  No fractures      Sinuses and Mastoids:  Clear.        IMPRESSION:     1.  No acute intracranial pathology.         DATE DICTATED:   08/25/2017          VISITING DIAGNOSIS:      ICD-10-CM    1.  Dementia without behavioral disturbance, unspecified dementia type ([facility] [address] F03.90 Vitamin B12 & Folate     EEG     CT HEAD WO CONTRAST     VL DUP CAROTID BILATERAL     XR CERVICAL SPINE (4-5 VIEWS)     XR CHEST STANDARD (2 VW)   2. Depression, major, recurrent, mild ([address]  F33.0    3. SHONDA treated with BiPAP  G47.33    4. Essential hypertension  I10 Vitamin B12 & Folate     EEG     CT HEAD WO CONTRAST     VL DUP CAROTID BILATERAL     XR CERVICAL SPINE (4-5 VIEWS)     XR CHEST STANDARD (2 VW)   5. Sick sinus syndrome (HCC)  I49.5    6. Paroxysmal atrial fibrillation (HCC)  I48.0    7. Gait difficulty  R26.9 Vitamin B12 & Folate     EEG     CT HEAD WO CONTRAST     VL DUP CAROTID BILATERAL     XR CERVICAL SPINE (4-5 VIEWS)     XR CHEST STANDARD (2 VW)   8. Balance problem  R26.89 Vitamin B12 & Folate     EEG     CT HEAD WO CONTRAST     VL DUP CAROTID BILATERAL     VL TRANSCRANIAL DOPPLER COMPLETE     XR CERVICAL SPINE (4-5 VIEWS)     XR CHEST STANDARD (2 VW)   9. Chronic cerebral ischemia  I67.82 Vitamin B12 & Folate     EEG     CT HEAD WO CONTRAST     VL DUP CAROTID BILATERAL     XR CERVICAL SPINE (4-5 VIEWS)     XR CHEST STANDARD (2 VW)   10. Risk for falls  Z91.81    11. Confusion  R41.0    12. Dizziness  R42 Vitamin B12 & Folate     EEG     CT HEAD WO CONTRAST     VL DUP CAROTID BILATERAL     VL TRANSCRANIAL DOPPLER COMPLETE     XR CERVICAL SPINE (4-5 VIEWS)     XR CHEST STANDARD (2 VW)   13. Brain dysfunction  G93.89 VL TRANSCRANIAL DOPPLER COMPLETE                CONCERNS   &   INCREASED   RISK   FOR         * TIA,  CEREBRO  VASCULAR  ISCHEMIA, STROKE     *   DIZZINESS,   VERTEBROBASILAR  INSUFFICIENCY ,       *   ORTHOSTATIC  INTOLERANCE,         *   COGNITIVE  &   MEMORY PROBLEMS  AND  DEMENTIAS         *  GAIT  DIFFICULTIES  &   BALANCE PROBLMES   AND  FALL                VARIOUS  RISK   FACTORS   WERE  REVIEWED   AND   DISCUSSED.           *  PATIENT   HAS  MULTIPLE   MEDICAL, NEUROLOGICAL                        AND   MENTAL HEALTH   PROBLEMS           PATIENT'S   MANAGEMENT  IS CHALLENGING. PLAN:                         Jim Godoy  Of  The  Diagnoses,  The  Management & Treatment  Options            AND    Care  plan  Were          Reviewed and   Discussed   With  patient. * Goals  And  Expectations  Of  The  Therapy  Discussed   And  Reviewed. *   Benefits   And   Side  Effect  Profile  Of  Medication/s   Were   Discussed                * Need   For  Further   Follow up For  The  Various  Problems Were  discussed. * Results  Of  The  Previous  Diagnostic tests were reviewed and  discussed                   Medical  Decision  Making  Was  Made  Based on the   Complexity  Of  Above  Mentioned  Diagnoses,    Data reviewed             And    Risk  Of  Significant   Co morbidities and   complicating   Factors. Medical  Decision  Was       High    Complexity   Due   To  The  Patient's  Multiple  Symptoms  &  Disease,            Complex  Treatment  Regimen,  Multiple medications           and   Risk  Of   Side  Effects,  Difficulty  In  Medication  Management  And  Diagnostic  Challenges           In  View  Of  The  Associated   Co  Morbid  Conditions   And  Problems. * FALL   PRECAUTIONS. THESE  REVIEWED   AND  DISCUSSED      *  USE   WALKING  ASSISTANCE  DEVICES     QUAD  CANE  /   WALKER          *    SUPERVISED   CARE    *   ABSOLUTELY   NO  DRIVING          *   BE  CAREFUL  WITH  ACTIVITIES            *   ADEQUATE   FLUID  INTAKE   AND  ELECTROLYTE  BALANCE           * KEEP  DAIRY  OF   THE  NEUROLOGICAL  SYMPTOMS        RECORDING THE    DURATION  AND  FREQUENCY. *  AVOID    CONDITIONS  AND  FACTORS   THAT  MAKE                  NEUROLOGICAL  SYMPTOMS  WORSE.         *TO  MAINTAIN  REGULAR  SLEEP  WAKE  CYCLES.      *   TO  HAVE  ADEQUATE  REST  AND   SLEEP    HOURS.        *    AVOID  ANY USAGE OF    TOBACCO,          AVOID  EXCESSIVE  ALCOHOL  AND   ILLEGAL   SUBSTANCES          *  CONTINUE MEDICATIONS    PRESCRIBED       AS    RECOMMENDED       *   Compliance   With  Medications   And  Instructions          * CURRENTLY    TOLERATING  THE  PRESCRIBED   MEDICATIONS. WITHOUT  ANY  SIGNIFICANT  SIDE  EFFECTS   &  GETTING BENEFIT. *  May   Use  Pill  Box,    If  Needed          *    Prophylactic  Use   Of     Vitamin   B   Complex,  Folic  Acid,    Vitamin  B12    Multivitamin,       Calcium  With  magnesium  And  Vit D    Supplementations   Over  The  Counter  Discussed               *  PATIENT  IS  ALSO   COUNSELED   TO  KEEP    ACTIVITIES:         A)   SIMPLE      B)  ORGANIZED      C)  WRITEDOWN                     *  EVALUATIONS  AND  FOLLOW UP:                   * PHYSICAL  THERAPY   / OCCUPATIONAL THERAPY   / SPEECH  THERAPY                               *CARDIOLOGY                *  ORTHO                                           *   IN  VIEW  OF  THE  PATIENT'S    MULTIPLE   NEUROLOGICAL                           SYMPTOMS  AND  CONCERNS    FOR  PROLONGED   DURATION,                           AND    MULTIPLE   CO MORBID  MEDICAL  CONDITIONS,                           PATIENT    NEEDS  NEURO  DIAGNOSTIC  EVALUATIONS                      FOR   ANY   NEUROLOGICAL  PATHOLOGIES    AND  OTHER                        CORRECTABLE   ETIOLOGIES;     AND                              PATIENT  REQUESTS   THE  SAME.                                            Orders Placed This Encounter   Procedures    CT HEAD WO CONTRAST    VL DUP CAROTID BILATERAL    VL TRANSCRANIAL DOPPLER COMPLETE    XR CERVICAL SPINE (4-5 VIEWS)    XR CHEST STANDARD (2 VW)    Vitamin B12 & Folate    EEG                             *  PATIENT  TO  RETURN  THE  CLINIC  AFTER   ABOVE,                       FOR   FURTHER    RE EVALUATIONS                               AND  ADDITIONAL  RECOMMENDATIONS           *PATIENT   TO  FOLLOW  UP  WITH   PRIMARY  CARE         OTHER  CONSULTANTS  AS  BEFORE.               *TO  FOLLOW WITH   MENTAL  HEALTH  PROFESSIONALS ,  INCLUDING            PSYCHOLOGICAL  COUNSELING   AND  PSYCHIATRIC  EVALUTIONS,                     *  Maintain   Healthy  Life Style    With   Periodic  Monitoring  Of          Any  Medical  Conditions  Including   Elevated  Blood  Pressure,  Lipid  Profile,        Blood  Sugar levels  AndHeart  Disease. *   Period   Screening  For  Cancers  Involving  Breast,  Colon,         Lungs  And  Other  Organs  As  Applicable,  In consultation   With  Your  Primary Care Providers. *Second  Neurological  Opinion  And  Evaluations  In  [facility]  Setting  If  Patient  Is  Interested. * Please   Contact   Neurology  Clinic   Early   If   Are  Any  New  Neurological   And  Any neurological  Concerns. *  If  The  Patient remains  Neurologically  Stable   Return   To  [facility] Neurology Department   IN      1-2       MONTHS  TIME   FOR  FURTHER              FOLLOW UP.                       *   The  Neurological   Findings,  Possible  Diagnosis,  Differential diagnoses                    And  Options  For    Further   Investigations                   And  management   Are  Discussed  Comprehensively. Medications   And  Prescription   Risks  And  Side effects  Are   Also  Discussed. *  If   There is  Any  Significant  Worsening   Of  Current  Symptoms  And  Or                  If patient  Develops   Any additional  New  NeurologicalSymptoms                  Or  Significant  Concerns   Should  Call  911 or  Go  To  Emergency  Department                  For  Further  Immediate  Evaluation and  management . The   Above  Were  Reviewed  With  PATIENT   and                         questions  Answered  In  Detail.                                               TOTAL   TIME     SPENT :               On this date 2/7/2022 I have spent  61 minutes     reviewing previous notes, test results               and face to face time with the patient including     discussing the diagnosis and importance of compliance               with the treatment plan  as well as documenting on the day of the visit. Electronically signed by   Jeana Gauthier M.D., Oracio Jean-Baptiste. Board Certified in  Neurology &  In  [facility] ([facility])      DISCLAIMER:   Although every effort was made to ensure the accuracy of this  electronictranscription, some errors in transcription may have occurred. GENERAL PATIENT INSTRUCTIONS:      A Healthy Lifestyle: Care Instructions   Your Care Instructions   A healthy lifestyle can help you feel good, stay at ahealthy weight, and have plenty of energy for both work and play. A healthy lifestyle is something you can share with your whole family.  A healthy lifestyle also can lower your risk for serious health problems, such ashigh blood pressure, heart disease, and diabetes.  You can follow a few steps listed below to improve your health and the health of your family.  Follow-up careis a key part of your treatment and safety. Be sure to make and go to all appointments, and call your doctor if you are having problems. Its also a good idea to know your test results and keep a list of the medicines you take.  How can you care for yourself at home?  Do not eat too much sugar, fat, or fast foods. You can still have dessert and treats nowand then. The goal is moderation.  Start small to improve your eating habits. Pay attention to portion sizes, drink less juice and soda pop, and eat more fruits and vegetables.  Eat a healthy amount of food. A 3-ounce serving of meat, for example, is about the size of a deck of cards. Fill the rest of your plate with vegetables and whole grains.    Limit theamount of soda and sports drinks you have every day. Drink more water when you are thirsty.  Eat at least 5 servings of fruits and vegetables every day. It may seem like a lot, but it is not hard to reach this goal. Aserving or helping is 1 piece of fruit, 1 cup of vegetables, or 2 cups of leafy, raw vegetables. Have an apple or some carrot sticks as an afternoon snack instead of a candy bar. Try to have fruits and/or vegetables at everymeal.   Make exercise part of your daily routine. You may want to start with simple activities, such as walking, bicycling, or slow swimming. Try dixon active 30 to 60 minutes every day. You do not need to do all 30 to 60 minutes all at once. For example, you can exercise 3 times a day for 10 or 20 minutes. Moderate exercise is safe for most people, but it is always agood idea to talk to your doctor before starting an exercise program.   Keep moving. Nya Never the lawn, work in the garden, or [employer]st. Take the stairs instead of the elevator at work.  If you smoke, quit. Peoplewho smoke have an increased risk for heart attack, stroke, cancer, and other lung illnesses. Quitting is hard, but there are ways to boost your chance of quitting tobacco for good.  Use nicotine gum, patches, or lozenges.  Ask your doctor about stop-smoking programs and medicines.  Keep trying.  In addition to reducing your risk of diseases in the future, you will notice some benefits soon after you stop using tobacco. If you have shortness of breath or asthma symptoms, they will likely getbetter within a few weeks after you quit.  Limit how much alcohol you drink. Moderate amounts of alcohol (up to 2 drinks a day for men, 1drink a day for women) are okay. But drinking too much can lead to liver problems, high blood pressure, and other health problems.  health   If you have a family, there are many things you can do together to improve your health.  Eat meals together as a family as often as possible.    Eat healthy foods. This includes fruits, vegetables, lean meats and dairy, and whole grains.  Include your family in your fitness plan. Most peoplethink of activities such as jogging or tennis as the way to fitness, but there are many ways you and your family can be more active. Anything that makes you breathe hard and gets your heart pumping is exercise. Here are sometips:   Walk to do errands or to take your child to school or the bus.  Go for a family bike ride after dinner instead of watching TV.  Where can you learn more?  Go toRock'n Rovertps://[employer]. org and sign in to your [employer] account. Enter C919 in the Search HealthInformation box to learn more about \"A Healthy Lifestyle: Care Instructions. \"     If you do not have anaccount, please click on the \"Sign Up Now\" link.  Current as of: July 26, 2016   Content Version: 11.2   © 2309-7006 [employer]. Care instructions adapted under license by [facility] ([facility]). If you have questions about a medical condition or this instruction, always ask your healthcare professional. [employer] disclaims any warranty or liability for your use of this information.

## 2022-02-07 NOTE — PROGRESS NOTES
This writer contacted scheduling to schedule the following testing: EEG, CT head wo contrast, carotid doppler and TCD - patient made aware, will contact office with further needs.

## 2022-02-11 ENCOUNTER — OFFICE VISIT (OUTPATIENT)
Dept: FAMILY MEDICINE CLINIC | Age: 77
End: 2022-02-11
Payer: MEDICARE

## 2022-02-11 VITALS
SYSTOLIC BLOOD PRESSURE: 112 MMHG | OXYGEN SATURATION: 97 % | HEART RATE: 46 BPM | RESPIRATION RATE: 16 BRPM | HEIGHT: 69 IN | BODY MASS INDEX: 30.81 KG/M2 | TEMPERATURE: 95.4 F | WEIGHT: 208 LBS | DIASTOLIC BLOOD PRESSURE: 60 MMHG

## 2022-02-11 DIAGNOSIS — I10 ESSENTIAL HYPERTENSION: Primary | ICD-10-CM

## 2022-02-11 DIAGNOSIS — C61 MALIGNANT NEOPLASM OF PROSTATE (HCC): ICD-10-CM

## 2022-02-11 DIAGNOSIS — I48.0 PAROXYSMAL ATRIAL FIBRILLATION (HCC): ICD-10-CM

## 2022-02-11 DIAGNOSIS — E78.2 MIXED HYPERLIPIDEMIA: ICD-10-CM

## 2022-02-11 PROCEDURE — G8417 CALC BMI ABV UP PARAM F/U: HCPCS | Performed by: FAMILY MEDICINE

## 2022-02-11 PROCEDURE — G8484 FLU IMMUNIZE NO ADMIN: HCPCS | Performed by: FAMILY MEDICINE

## 2022-02-11 PROCEDURE — 1036F TOBACCO NON-USER: CPT | Performed by: FAMILY MEDICINE

## 2022-02-11 PROCEDURE — 99213 OFFICE O/P EST LOW 20 MIN: CPT | Performed by: FAMILY MEDICINE

## 2022-02-11 PROCEDURE — 1123F ACP DISCUSS/DSCN MKR DOCD: CPT | Performed by: FAMILY MEDICINE

## 2022-02-11 PROCEDURE — G8427 DOCREV CUR MEDS BY ELIG CLIN: HCPCS | Performed by: FAMILY MEDICINE

## 2022-02-11 PROCEDURE — 99212 OFFICE O/P EST SF 10 MIN: CPT | Performed by: FAMILY MEDICINE

## 2022-02-11 PROCEDURE — 4040F PNEUMOC VAC/ADMIN/RCVD: CPT | Performed by: FAMILY MEDICINE

## 2022-02-11 ASSESSMENT — ENCOUNTER SYMPTOMS
COUGH: 0
SHORTNESS OF BREATH: 0
WHEEZING: 0
ABDOMINAL PAIN: 0
CHEST TIGHTNESS: 0
EYE PAIN: 0

## 2022-02-11 ASSESSMENT — PATIENT HEALTH QUESTIONNAIRE - PHQ9
2. FEELING DOWN, DEPRESSED OR HOPELESS: 1
SUM OF ALL RESPONSES TO PHQ QUESTIONS 1-9: 1
SUM OF ALL RESPONSES TO PHQ QUESTIONS 1-9: 1
SUM OF ALL RESPONSES TO PHQ9 QUESTIONS 1 & 2: 1
SUM OF ALL RESPONSES TO PHQ QUESTIONS 1-9: 1
SUM OF ALL RESPONSES TO PHQ QUESTIONS 1-9: 1
1. LITTLE INTEREST OR PLEASURE IN DOING THINGS: 0

## 2022-02-11 NOTE — PROGRESS NOTES
 Difficulty of Paying Living Expenses: Not hard at all   Food Insecurity: No Food Insecurity    Worried About Running Out of Food in the Last Year: Never true    Ran Out of Food in the Last Year: Never true   Transportation Needs:     Lack of Transportation (Medical): Not on file    Lack of Transportation (Non-Medical):  Not on file   Physical Activity:     Days of Exercise per Week: Not on file    Minutes of Exercise per Session: Not on file   Stress:     Feeling of Stress : Not on file   Social Connections:     Frequency of Communication with Friends and Family: Not on file    Frequency of Social Gatherings with Friends and Family: Not on file    Attends [employer] Services: Not on file    Active Member of [address] [place] [address] [employer] Services or Organizations: Not on file    Attends Club or Organization Meetings: Not on file    Marital Status: Not on file   Intimate Partner Violence:     Fear of Current or Ex-Partner: Not on file    Emotionally Abused: Not on file    Physically Abused: Not on file    Sexually Abused: Not on file   Housing Stability:     Unable to Pay for Housing in the Last Year: Not on file    Number of [place] in the Last Year: Not on file    Unstable Housing in the Last Year: Not on file       Current Outpatient Medications   Medication Sig Dispense Refill    mirabegron (MYRBETRIQ) 25 MG TB24 take 1 tablet by mouth once daily 90 tablet 0    citalopram (CELEXA) 20 MG tablet take 1 tablet by mouth once daily 30 tablet 5    memantine (NAMENDA) 10 MG tablet Take 1 tablet by mouth 2 times daily 60 tablet 5    donepezil (ARICEPT) 5 MG tablet take 1 tablet by mouth nightly 90 tablet 1    oxybutynin (DITROPAN-XL) 5 MG extended release tablet take 1 tablet by mouth once daily      Glucosamine-Chondroit-Vit C-Mn (GLUCOSAMINE 1500 COMPLEX PO) Take by mouth      CPAP Machine MISC by Does not apply route Pressure of 13      vitamin B-12 (CYANOCOBALAMIN) 1000 MCG tablet Take 1,000 mcg by mouth daily  ELIQUIS 5 MG TABS tablet Take 5 mg by mouth 2 times daily   0    Coenzyme Q10 10 MG CAPS Take 1 capsule by mouth daily       No current facility-administered medications for this visit. Objective:  Patient is with wife, he feels ood today. I asked him about his pulse he says it is usually low and that he has a pacemaker and is under care of Dr. Ravi Alford cardiologist. He is under care of Dr. Krishna Quinteros who is doing his PSA's     Review of Systems   Constitutional: Negative for fatigue and unexpected weight change. Eyes: Negative for pain and visual disturbance. Respiratory: Negative for cough, chest tightness, shortness of breath and wheezing. Cardiovascular: Negative for chest pain, palpitations and leg swelling. Gastrointestinal: Negative for abdominal pain. Genitourinary: Negative for difficulty urinating and hematuria. Musculoskeletal: Negative for arthralgias and myalgias. Skin: Negative for rash and wound. Neurological: Negative for dizziness, weakness, numbness and headaches. Hematological: Does not bruise/bleed easily. Psychiatric/Behavioral: Positive for confusion (has appt with neurologist). Negative for decreased concentration, sleep disturbance and suicidal ideas. The patient is not nervous/anxious. Physical Exam  Constitutional:       Appearance: Normal appearance. HENT:      Head: Normocephalic and atraumatic. Right Ear: Tympanic membrane and external ear normal.      Left Ear: Tympanic membrane and external ear normal.      Nose: Nose normal.      Mouth/Throat:      Mouth: Mucous membranes are moist.      Pharynx: Oropharynx is clear. Eyes:      Extraocular Movements: Extraocular movements intact. Conjunctiva/sclera: Conjunctivae normal.      Pupils: Pupils are equal, round, and reactive to light. Cardiovascular:      Rate and Rhythm: Normal rate and regular rhythm. Pulses: Normal pulses. Heart sounds: Normal heart sounds.  No murmur addictive food behavior and obesity. Avoid all processed foods, foods from boxes, cans, microwave meals as these contain high salt, sugar or fat content and not much nutrition. Get at least 8 hrs of sleep every night and turn off all electronics at least 1 hour before bedtime as these decreases melatonin production and increases wakefulness. If your cholesterol is high, no greasy, fried, fast or fatty foods. Decrease red meat intake. No butter, fisher, lard or creams, no milk as these things clog your arteries and leads to heart attacks and death. If you smoke, smoking increases risk of lung disease, cancers, high BP, heart attack, stroke and death. Take your daily medications as prescribed and inform your family doctor if you are having any side effects or issues taking medications.            Return in about 8 months (around 10/11/2022), or Andrés [address], DO

## 2022-02-14 ENCOUNTER — HOSPITAL ENCOUNTER (OUTPATIENT)
Dept: INTERVENTIONAL RADIOLOGY/VASCULAR | Age: 77
Discharge: HOME | End: 2022-02-16
Payer: MEDICARE

## 2022-02-14 ENCOUNTER — HOSPITAL ENCOUNTER (OUTPATIENT)
Dept: CT IMAGING | Age: 77
Discharge: HOME | End: 2022-02-16
Payer: MEDICARE

## 2022-02-14 DIAGNOSIS — R26.89 BALANCE PROBLEM: ICD-10-CM

## 2022-02-14 DIAGNOSIS — I67.82 CHRONIC CEREBRAL ISCHEMIA: ICD-10-CM

## 2022-02-14 DIAGNOSIS — R42 DIZZINESS: ICD-10-CM

## 2022-02-14 DIAGNOSIS — R26.9 GAIT DIFFICULTY: ICD-10-CM

## 2022-02-14 DIAGNOSIS — I10 ESSENTIAL HYPERTENSION: ICD-10-CM

## 2022-02-14 DIAGNOSIS — F03.90 DEMENTIA WITHOUT BEHAVIORAL DISTURBANCE, UNSPECIFIED DEMENTIA TYPE: ICD-10-CM

## 2022-02-14 PROCEDURE — 93880 EXTRACRANIAL BILAT STUDY: CPT

## 2022-02-14 PROCEDURE — 70450 CT HEAD/BRAIN W/O DYE: CPT

## 2022-02-16 ENCOUNTER — HOSPITAL ENCOUNTER (OUTPATIENT)
Dept: LAB | Age: 77
Discharge: HOME | End: 2022-02-16
Payer: MEDICARE

## 2022-02-16 ENCOUNTER — OFFICE VISIT (OUTPATIENT)
Dept: NEUROLOGY | Age: 77
End: 2022-02-16
Payer: MEDICARE

## 2022-02-16 VITALS
OXYGEN SATURATION: 97 % | HEART RATE: 52 BPM | SYSTOLIC BLOOD PRESSURE: 132 MMHG | WEIGHT: 208 LBS | DIASTOLIC BLOOD PRESSURE: 70 MMHG | HEIGHT: 69 IN | BODY MASS INDEX: 30.81 KG/M2

## 2022-02-16 DIAGNOSIS — I10 ESSENTIAL HYPERTENSION: ICD-10-CM

## 2022-02-16 DIAGNOSIS — I48.0 PAROXYSMAL ATRIAL FIBRILLATION (HCC): ICD-10-CM

## 2022-02-16 DIAGNOSIS — G31.9 ACQUIRED CEREBRAL ATROPHY (HCC): ICD-10-CM

## 2022-02-16 DIAGNOSIS — R26.89 BALANCE PROBLEM: ICD-10-CM

## 2022-02-16 DIAGNOSIS — F03.90 DEMENTIA WITHOUT BEHAVIORAL DISTURBANCE, UNSPECIFIED DEMENTIA TYPE: ICD-10-CM

## 2022-02-16 DIAGNOSIS — R41.0 CONFUSION: ICD-10-CM

## 2022-02-16 DIAGNOSIS — R42 DIZZINESS: ICD-10-CM

## 2022-02-16 DIAGNOSIS — R26.9 GAIT DIFFICULTY: ICD-10-CM

## 2022-02-16 DIAGNOSIS — F03.90 DEMENTIA WITHOUT BEHAVIORAL DISTURBANCE, UNSPECIFIED DEMENTIA TYPE: Primary | ICD-10-CM

## 2022-02-16 DIAGNOSIS — G96.08 NONTRAUMATIC SUBDURAL HYGROMA: ICD-10-CM

## 2022-02-16 DIAGNOSIS — I67.82 CHRONIC CEREBRAL ISCHEMIA: ICD-10-CM

## 2022-02-16 DIAGNOSIS — E78.2 MIXED HYPERLIPIDEMIA: ICD-10-CM

## 2022-02-16 PROBLEM — E66.9 OBESITY: Status: RESOLVED | Noted: 2017-07-24 | Resolved: 2022-02-16

## 2022-02-16 PROBLEM — G31.84 MILD COGNITIVE IMPAIRMENT WITH MEMORY LOSS: Status: RESOLVED | Noted: 2018-11-12 | Resolved: 2022-02-16

## 2022-02-16 PROBLEM — G93.89 BRAIN DYSFUNCTION: Status: RESOLVED | Noted: 2022-02-07 | Resolved: 2022-02-16

## 2022-02-16 PROCEDURE — 82746 ASSAY OF FOLIC ACID SERUM: CPT

## 2022-02-16 PROCEDURE — 82607 VITAMIN B-12: CPT

## 2022-02-16 PROCEDURE — 1123F ACP DISCUSS/DSCN MKR DOCD: CPT | Performed by: PSYCHIATRY & NEUROLOGY

## 2022-02-16 PROCEDURE — G8417 CALC BMI ABV UP PARAM F/U: HCPCS | Performed by: PSYCHIATRY & NEUROLOGY

## 2022-02-16 PROCEDURE — 1036F TOBACCO NON-USER: CPT | Performed by: PSYCHIATRY & NEUROLOGY

## 2022-02-16 PROCEDURE — G8427 DOCREV CUR MEDS BY ELIG CLIN: HCPCS | Performed by: PSYCHIATRY & NEUROLOGY

## 2022-02-16 PROCEDURE — 99213 OFFICE O/P EST LOW 20 MIN: CPT | Performed by: PSYCHIATRY & NEUROLOGY

## 2022-02-16 PROCEDURE — G8484 FLU IMMUNIZE NO ADMIN: HCPCS | Performed by: PSYCHIATRY & NEUROLOGY

## 2022-02-16 PROCEDURE — 4040F PNEUMOC VAC/ADMIN/RCVD: CPT | Performed by: PSYCHIATRY & NEUROLOGY

## 2022-02-16 PROCEDURE — 36415 COLL VENOUS BLD VENIPUNCTURE: CPT

## 2022-02-16 PROCEDURE — 99214 OFFICE O/P EST MOD 30 MIN: CPT | Performed by: PSYCHIATRY & NEUROLOGY

## 2022-02-16 ASSESSMENT — ENCOUNTER SYMPTOMS
SHORTNESS OF BREATH: 0
VOMITING: 0
NAUSEA: 0
VOICE CHANGE: 0
COUGH: 0
EYE ITCHING: 0
SINUS PRESSURE: 0
EYE DISCHARGE: 0
ABDOMINAL DISTENTION: 0
CONSTIPATION: 0
VISUAL CHANGE: 0
BOWEL INCONTINENCE: 0
BACK PAIN: 0
EYE REDNESS: 0
BLOOD IN STOOL: 0
CHEST TIGHTNESS: 0
FACIAL SWELLING: 0
EYE PAIN: 0
ABDOMINAL PAIN: 0
DIARRHEA: 0
TROUBLE SWALLOWING: 0
WHEEZING: 0
APNEA: 0
PHOTOPHOBIA: 0
CHOKING: 0
COLOR CHANGE: 0
SORE THROAT: 0

## 2022-02-16 NOTE — PROGRESS NOTES
[facility]  Neurology    [address]:[phone]   Fax: [phone]        SUBJECTIVE:       PATIENT ID:  Charmayne Coffin is a  RIGHT     HANDED 68 y.o. male. Neurologic Problem  The patient's primary symptoms include clumsiness, focal sensory loss, focal weakness, a loss of balance, memory loss and weakness. The patient's pertinent negatives include no altered mental status, near-syncope, slurred speech, syncope or visual change. This is a chronic problem. Episode onset: WORSE  SINCE   3  YEARS. The problem has been gradually worsening since onset. There was no focality noted. Associated symptoms include bladder incontinence, confusion, dizziness and light-headedness. Pertinent negatives include no abdominal pain, auditory change, back pain, bowel incontinence, chest pain, diaphoresis, fatigue, fever, headaches, nausea, neck pain, palpitations, shortness of breath or vomiting. Past treatments include medication. The treatment provided mild relief. His past medical history is significant for dementia and mood changes. There is no history of a bleeding disorder, a clotting disorder, a CVA, head trauma, liver disease or seizures. History obtained from  The   PATIENT      AND   HIS  WIFE       and other  available   medical  records   were  Also  reviewed. The  Duration,  Quality,  Severity,  Location,  Timing,  Context,  Modifying  Factors   Of   The   Chief   Complaint       And  Present  Illness  Was   Reviewed   In   Chronological   [place]. PATIENT'S  MAIN  CONCERNS INCLUDE :                         1)     KNOWN     H/O    PROGRESSIVE    DEMENTIA   FOR   10   YEARS                                     AS  PER    PATIENT'S    WIFE.                                     FAMILY      H/O   DEMENTIA      ON   HIS  MATERNAL  SIDE                                         IN  MULTIPLE MEMBERS                             2)       WORSENING  OF   DEMENTIA ,   SPEECH  PROBLEMS                                      AND   H/O   BRIEF  CONFUSION        SINCE   2019                                     -    MODERATELY    SEVERE    DEMENTIA                            3)    PATIENT   HAS  BEEN  ON   ARICEPT   AND  NAMENDA      FOR                                     3   YEARS      AS   PER  HIS  WIFE                            4)     CHRONIC  ATRIAL  FIBRILLATION                                      -   ON  ELIQUIS                                 BEING  FOLLOWED  BY  HIS   CARDIOLOGIST                          5)     H/O     CHRONIC      ANXIETY,   DEPRESSION                                        -   ON  CELEXA                             6)      H/O   BRIEF  DIZZINESS      INTERMITTENTLY                               WORSENED  BY  POSITIONAL  CHANGES                         7)     CHRONIC   GAIT  AND  BALANCE  PROBLEMS                              PATIENT   USES  WALKER                      8)    CO MORBID  MULTIPLE  MEDICAL  CONDITIONS                               BEING   FOLLOWED  BY    HIS  PCP      AND                                       ALSO     VA  PROVIDERS                          9)    PATIENT   HIGH  RISK  FOR     FALLS                                   AND    CEREBRAL ISCHEMIA                                             10)    PATIENT    HAD    NEURO  DIAGNOSTIC  EVALUATIONS  IN  FEB. 2022                               A)    CT   HEAD      SHOWED      4  MM    LEFT   HEMISPHERIC                                       SUBDURAL   HYGROMA  /   CHRONIC  SUBDURAL  HEMATOMA                                            WITH  OUT  ANY  PRESSURE  EFFECTS                                        CHRONIC   CEREBRAL  ISCHEMIA       AND  ATROPHY                             B)     CAROTID  DOPPLER      SHOWED                                         NO  SIGNIFICANT  ABNORMALITY                             C) EEG      AND    VIT  B 12   LEVELS      ARE  PENDING                               -ABOVE   REVIEWED      WITH    PATIENT    AND    HIS  WIFE                         11)       RECOMMENDED :                                    A)    SUPERVISED    CARE                               B)    FALL  PRECAUTIONS                                   C)    NEURO  SURGICAL    OPINION                                             -   THEY   REFUSED    THE  SAME                                  D)    TO  CONTINUE   ARICEPT  AND  NAMENDA     AS  BEFORE                                  E)    COMPLETE   THE   REMAINING  WORK  UP                                            AS   RECOMMENDED                       12)       VARIOUS  RISK   FACTORS   WERE  REVIEWED   AND   DISCUSSED. PATIENT   HAS  MULTIPLE   MEDICAL, NEUROLOGICAL                        AND   MENTAL HEALTH   PROBLEMS . PATIENT'S   MANAGEMENT  IS  CHALLENGING.                                         PRECIPITATING  FACTORS: including  fever/infection, exertion/relaxation, position change, stress,                weather change,   medications/alcohol, time of day/darkness/light  Are  present                                                          MODIFYING  FACTORS:  fever/infection, exertion/relaxation, position change, stress, weather change,               medications/alcohol, time of day/darkness/light  Are  present                Patient   Indicates   The  Presence   And  The  Absence  Of  The  Following    Associated  And             Additional  Neurological    Symptoms:                                Balance  And coordination   problems  present           Gait problems     present            Headaches      absent              Migraines           absent           Memory problemspresent              Confusion        present            Paresthesia   numbness          present           Seizures  And  Starring  Episodes           absent Syncope,  Near  syncopal episodes         absent           Speech   problems           absent             Swallowing   Problems      absent            Dizziness,  Light headedness           present              Vertigo        absent             Generalized   Weakness    present              focal  Weakness     absent             Tremors         absent              Sleep  Problems     absent             History  Of   Recent  Head  Injury     absent             History  Of   Recent  TIA     absent             History  Of   Recent    Stroke     absent             Neck  Pain   and   Neck muscle  Spasms  absent               Radiating  down   And   Weakness           absent            Lower back   Pain  And     Spasms  absent              Radiating    Down   And   Weakness          absent                H/OFALLS        absent               History  Of   Visual  Symptoms    absent                  Associated   Diplopia       absent                                               Also   Additional   Symptoms   Present    As  Documented    In   The   detailed                  Review  Of  Systems   And    Please   Refer   To    Them for   Additional    Information. Any components  That are either  Unobtainable  Or  Limited  In   HPI, ROS  And/or PFSH   Are                   Due   ToPatient's  Medical  Problems,  Clinical  Condition   and/or lack of                                 other    Alternate   resources.             RECORDS   REVIEWED:    historical medical records           INFORMATION   REVIEWED:     MEDICAL   HISTORY,SURGICAL   HISTORY,     MEDICATIONS   LIST,   ALLERGIES AND  DRUG  INTOLERANCES,       FAMILY   HISTORY,  SOCIAL  HISTORY,      PROBLEM  LIST   FOR  PATIENT  CARE   COORDINATION          Past Medical History:   Diagnosis Date    Diverticulosis     Hyperlipidemia     Low HDL    Hyperuricemia     Obesity     Osteoarthrosis     not designated as generalized or localized    Overactive bladder     Prostate cancer [facility])     Third degree heart block ([address] 2017         Past Surgical History:   Procedure Laterality Date    A-V CARDIAC PACEMAKER INSERTION      Dr Ivan Cochran; [facility]    COLONOSCOPY  2007    diverticulosis    CYSTOSCOPY  2019    with Botox    FIBULA FRACTURE SURGERY Right 2000    [facility]    HIP ARTHROPLASTY Bilateral ,    Dr Lina Collins  2017    PROSTATE BIOPSY  07/10/2008    PROSTATECTOMY  2008    w/lymph node dissection Dr Pradeep Anguiano Right 10/2013    Dr Samantha Hung,     [facility]ible         Current Outpatient Medications   Medication Sig Dispense Refill    mirabegron (MYRBETRIQ) 25 MG TB24 take 1 tablet by mouth once daily 90 tablet 0    citalopram (CELEXA) 20 MG tablet take 1 tablet by mouth once daily 30 tablet 5    memantine (NAMENDA) 10 MG tablet Take 1 tablet by mouth 2 times daily 60 tablet 5    donepezil (ARICEPT) 5 MG tablet take 1 tablet by mouth nightly 90 tablet 1    oxybutynin (DITROPAN-XL) 5 MG extended release tablet take 1 tablet by mouth once daily      Glucosamine-Chondroit-Vit C-Mn (GLUCOSAMINE 1500 COMPLEX PO) Take by mouth      CPAP Machine MISC by Does not apply route Pressure of 13      vitamin B-12 (CYANOCOBALAMIN) 1000 MCG tablet Take 1,000 mcg by mouth daily      ELIQUIS 5 MG TABS tablet Take 5 mg by mouth 2 times daily   0    Coenzyme Q10 10 MG CAPS Take 1 capsule by mouth daily       No current facility-administered medications for this visit.          Allergies   Allergen Reactions    Pravastatin          Family History   Problem Relation Age of Onset    Alzheimer's Disease Mother     Emphysema Father     Other Father         vascular disease- of aneurysm         Social History     Socioeconomic History    Marital status:      Spouse name: Not on file    Number of children: Not on file    Years of education: Not on file    Highest education level: Not on file   Occupational History    Not on file   Tobacco Use    Smoking status: Former Smoker     Packs/day: 2.00     Years: 15.00     Pack years: 30.00     Quit date: 10/18/1981     Years since quittin.3    Smokeless tobacco: Never Used   Substance and Sexual Activity    Alcohol use: No    Drug use: No    Sexual activity: Not on file   Other Topics Concern    Not on file   Social History Narrative    Not on file     Social Determinants of Health     Financial Resource Strain: Low Risk     Difficulty of Paying Living Expenses: Not hard at all   Food Insecurity: No Food Insecurity    Worried About [address] in the Last Year: Never true    [facility] in the Last Year: Never true   Transportation Needs:     Lack of Transportation (Medical): Not on file    Lack of Transportation (Non-Medical):  Not on file   Physical Activity:     Days of Exercise per Week: Not on file    Minutes of Exercise per Session: Not on file   Stress:     Feeling of Stress : Not on file   Social Connections:     Frequency of Communication with Friends and Family: Not on file    Frequency of Social Gatherings with Friends and Family: Not on file    Attends Confucianism Services: Not on file    Active Member of [address] [place] [address] or Organizations: Not on file    Attends Club or Organization Meetings: Not on file    Marital Status: Not on file   Intimate Partner Violence:     Fear of Current or Ex-Partner: Not on file    Emotionally Abused: Not on file    Physically Abused: Not on file    Sexually Abused: Not on file   Housing Stability:     Unable to Pay for Housing in the Last Year: Not on file    Number of [place] in the Last Year: Not on file    Unstable Housing in the Last Year: Not on file       Vitals:    22 1439   BP: 132/70   Pulse: 52   SpO2: 97%         Wt Readings from Last 3 Encounters:   22 208 lb (94.3 kg)   22 208 lb (94.3 kg)   22 213 lb (96.6 kg)         BP Readings from Last 3 Encounters:   02/16/22 132/70   02/11/22 112/60   02/07/22 114/68           Hematology and Coagulation    Lab Results   Component Value Date    WBC 7.3 01/28/2022    WBC 6.8 12/30/2019    RBC 4.88 01/28/2022    HGB 14.7 01/28/2022    HCT 41.9 01/28/2022    MCV 86.0 01/28/2022    MCH 30.1 01/28/2022    MCHC 35.0 01/28/2022    RDW 10.5 01/28/2022    .8 01/28/2022    MPV 13.1 12/30/2019       No results found for: ESR    Chemistries    Lab Results   Component Value Date     01/28/2022    K 4.3 01/28/2022     01/28/2022    CO2 28 01/28/2022    BUN 16 01/28/2022    CREATININE 0.9 01/28/2022    CALCIUM 9.3 01/28/2022    PROT 7.0 12/30/2019    LABALBU 4.0 01/28/2022    BILITOT 0.7 01/28/2022    ALKPHOS 58 01/28/2022    AST 23 01/28/2022    ALT 12 01/28/2022     Lab Results   Component Value Date    ALKPHOS 58 01/28/2022    ALT 12 01/28/2022    AST 23 01/28/2022    PROT 7.0 12/30/2019    BILITOT 0.7 01/28/2022    BILIDIR 0.0 04/16/2018    LABALBU 4.0 01/28/2022     Lab Results   Component Value Date    BUN 16 01/28/2022    CREATININE 0.9 01/28/2022     Lab Results   Component Value Date    CALCIUM 9.3 01/28/2022     Lab Results   Component Value Date    AST 23 01/28/2022    ALT 12 01/28/2022       Lab Results   Component Value Date    CKTOTAL 120 08/25/2017       Review of Systems   Constitutional: Negative for appetite change, chills, diaphoresis, fatigue, fever and unexpected weight change. HENT: Negative for congestion, dental problem, drooling, ear discharge, ear pain, facial swelling, hearing loss, mouth sores, nosebleeds, postnasal drip, sinus pressure, sore throat, tinnitus, trouble swallowing and voice change. Eyes: Negative for photophobia, pain, discharge, redness, itching and visual disturbance. Respiratory: Negative for apnea, cough, choking, chest tightness, shortness of breath and wheezing.     Cardiovascular: Negative for chest pain, palpitations, leg swelling and near-syncope. Gastrointestinal: Negative for abdominal distention, abdominal pain, blood in stool, bowel incontinence, constipation, diarrhea, nausea and vomiting. Endocrine: Negative for cold intolerance, heat intolerance, polydipsia, polyphagia and polyuria. Genitourinary: Positive for bladder incontinence. Musculoskeletal: Positive for arthralgias and gait problem. Negative for back pain, joint swelling, myalgias, neck pain and neck stiffness. Skin: Negative for color change, pallor, rash and wound. Allergic/Immunologic: Negative for environmental allergies, food allergies and immunocompromised state. Neurological: Positive for dizziness, focal weakness, weakness, light-headedness, numbness and loss of balance. Negative for tremors, seizures, syncope, facial asymmetry, speech difficulty and headaches. Hematological: Negative for adenopathy. Does not bruise/bleed easily. Psychiatric/Behavioral: Positive for confusion, decreased concentration and memory loss. Negative for agitation, behavioral problems, dysphoric mood, hallucinations, self-injury, sleep disturbance and suicidal ideas. The patient is nervous/anxious. The patient is not hyperactive. OBJECTIVE:      Physical Exam  Constitutional:       Appearance: He is well-developed. HENT:      Head: Normocephalic and atraumatic. No raccoon eyes or Cruz's sign. Right Ear: External ear normal.      Left Ear: External ear normal.      Nose: Nose normal.   Eyes:      Conjunctiva/sclera: Conjunctivae normal.      Pupils: Pupils are equal, round, and reactive to light. Neck:      Thyroid: No thyroid mass or thyromegaly. Vascular: No carotid bruit. Trachea: No tracheal deviation. Meningeal: Brudzinski's sign and Kernig's sign absent. Cardiovascular:      Rate and Rhythm: Normal rate and regular rhythm.    Pulmonary:      Effort: Pulmonary effort is normal.   Musculoskeletal: General: No tenderness. Cervical back: Normal range of motion and neck supple. No rigidity. No muscular tenderness. Normal range of motion. Skin:     General: Skin is warm. Coloration: Skin is not pale. Findings: No erythema or rash. Nails: There is no clubbing. Psychiatric:         Attention and Perception: He is attentive. Mood and Affect: Mood is anxious and depressed. Affect is not labile, blunt or inappropriate. Speech: He is communicative. Speech is delayed. Speech is not rapid and pressured, slurred or tangential.         Behavior: Behavior is slowed. Behavior is not agitated, aggressive, withdrawn, hyperactive or combative. Behavior is cooperative. Thought Content: Thought content is not paranoid or delusional. Thought content does not include homicidal or suicidal ideation. Thought content does not include homicidal or suicidal plan. Cognition and Memory: Cognition is impaired. Memory is impaired. He exhibits impaired recent memory and impaired remote memory. NEUROLOGICALEXAMINATION :     A) MENTAL STATUS:                   Alert and  oriented  To    place  And  Person. No Aphasia. Able   To  Follow     SIMPLE    commands                     No right  To left confusion. SLOW   Speech  And language function. Insight and  Judgment ,Fund  Of  Knowledge    DECREASED                  Recent  And  Remote memory   DECREASED                  Attention &  Concentration are   DECREASED                         B) CRANIAL NERVES :        CN : Visual  Acuity  And  Visual fields    DECREASED                 Fundi  Could  Not  Be  Could  Not  Be  Evaluated. 3,4,6 CN : Both  Pupils are   Reactive and  Equal.  Movements  Are  Intact. No  Nystagmus. No  JASPER. No  Afferent  Pupillary  Defect noted.           5 CN :  Normal  Facial sensations and Corneal  Reflexes           7 CN:  Normal  Facial  Symmetry  And  Strength. No facial  Weakness. 8 CN :  Hearing  Appears   DECREASED            9, 10 CN: Normal   spontaneous, reflex   palate   movements         11 CN:   Normal  Shoulder  shrug and  strength         12 CN :   Normal  Tongue movements and  Tongue  In midline                        No tongue   Fasciculations or atrophy       C) MOTOR  EXAM:                 Strength  In upper  And  Lower   extremities   4 +/5    DECREASED                            Rapid   alternating  And  repetitions  Movements    DECREASED                   Muscle  Tone  In upper  And  Lower  Extremities  normal                No rigidity. No  Spasticity. Bradykinesia   absent                 No  Asterixis.               Sustention  Tremor , Resting   Tremor   absent                    No   other  Abnormal  Movements noted           D) SENSORY :               Light   touch, pinprick,   position  And  Vibration    DECREASED        E) REFLEXES:                   Deep  Tendon  Reflexes   ABNORMAL                                     F) COORDINATION  AND  GAIT :                                Station and  Gait    SLOW  UNSTEADY                                Romberg 's test    CANNOT  BE PERFORMED                            Ataxia negative        ASSESSMENT:      Patient Active Problem List   Diagnosis    Hyperlipidemia    Essential hypertension    Malignant [facility] of [facility]    Diverticulosis of intestine without perforation or abscess without bleeding    Osteoarthritis of knee    Third degree heart block (HCC)    Sick sinus syndrome (HCC)    Paroxysmal atrial fibrillation (HCC)    SHONDA treated with BiPAP    Depression, major, recurrent, mild (HCC)    Dementia without behavioral disturbance (HCC)    BMI 32.0-32.9,adult    Gait difficulty    Balance problem    Chronic cerebral ischemia    Risk for falls    Confusion    Dizziness    Acquired cerebral [facility]    Nontraumatic subdural hygroma           CT OF THE HEAD WITHOUT CONTRAST  2/14/2022 1:30 pm       TECHNIQUE:   CT of the head was performed without the administration of intravenous   contrast. Dose modulation, iterative reconstruction, and/or weight based   adjustment of the mA/kV was utilized to reduce the radiation dose to as low   as reasonably achievable.       COMPARISON:   None.       HISTORY:   ORDERING SYSTEM PROVIDED HISTORY: Dementia without behavioral disturbance,   unspecified dementia type ([facility]   TECHNOLOGIST PROVIDED HISTORY:   Reason for Exam: Dementia; gait difficulty; dizziness       FINDINGS:   There is 4 mm extra-axial hypodense collection of left cerebral hemisphere   mostly consistent with subdural hygroma or chronic subdural hematoma.  No   significant mass effect on adjacent brain parenchyma.       There is no acute infarction, intracranial hemorrhage or intracranial mass   lesion. No mass effect, midline shift. .       There are mild nonspecific foci of periventricular and subcortical cerebral   white matter hypodensity, most likely representing chronic microangiopathic   disease in this age group. The brain parenchyma is otherwise normal. The   cerebellar tonsils are in normal position.       The ventricles, sulci, and cisterns are mildly prominent suggestive of mild   generalized volume loss.       The globes and orbits are within normal limits. The visualized extracranial   structures including paranasal sinuses and mastoid air cells are   unremarkable. No fracture is identified.           Impression       4 mm extra-axial hypodense left hemispheric collection mostly consistent with   subdural hygroma or chronic subdural hematoma.  No significant mass effect on   adjacent brain parenchyma.       No evidence for acute intraparenchymal hemorrhage, territorial infarction or   intracranial mass lesion.       Mild chronic microangiopathic ischemic disease. Mild generalized volume loss.       The findings were sent to the Radiology Results [address] at 6:11   pm on 2/14/2022to be communicated to a licensed caregiver.                 DATE OF EXAM:  08/25/2017        EXAM:  CT of the brain        CLINICAL HISTORY:  A 71-year-old male with mental status changes.        TECHNIQUE:  Axial images obtained along with multiplanar reconstructed images from the 3D data set that includes coronal and sagittal views.  Automatic exposure control techniques were used to obtain the study.  CT of the brain without contrast.       COMPARISON:  No relevant prior studies available.       FINDINGS:         Hemorrhage:  No evidence of acute hemorrhage. Brain:  Mild neurodegenerative effects of aging.  No mass, mass effect, or midline shift.     Ventricles:  No hydrocephalus.     Bones:  No fractures      Sinuses and Mastoids:  Clear.        IMPRESSION:     1.  No acute intracranial pathology.         DATE DICTATED:   08/25/2017          VISITING DIAGNOSIS:      ICD-10-CM    1. Dementia without behavioral disturbance, unspecified dementia type ([facility] [address]  F03.90    2. Dizziness  R42    3. Gait difficulty  R26.9    4. Confusion  R41.0    5. Balance problem  R26.89    6. Mixed hyperlipidemia  E78.2    7. Essential hypertension  I10    8. Paroxysmal atrial fibrillation (HCC)  I48.0    9. Chronic cerebral ischemia  I67.82    10. Acquired cerebral atrophy ([facility] [address]  G31.9    11. Nontraumatic subdural hygroma  G96.08                 CONCERNS   &   INCREASED   RISK   FOR         * TIA,  CEREBRO  VASCULAR  ISCHEMIA, STROKE     *   DIZZINESS,   VERTEBROBASILAR  INSUFFICIENCY ,       *   ORTHOSTATIC  INTOLERANCE,         *   COGNITIVE  &   MEMORY PROBLEMS  AND  DEMENTIAS         *  GAIT  DIFFICULTIES  &   BALANCE PROBLMES   AND  FALL                VARIOUS  RISK   FACTORS   WERE  REVIEWED   AND   DISCUSSED.           *  PATIENT   HAS  MULTIPLE   MEDICAL, NEUROLOGICAL                        AND MENTAL HEALTH   PROBLEMS           PATIENT'S   MANAGEMENT  IS  CHALLENGING. PLAN:                         Jules Kimball  Of  The  Diagnoses,  The  Management & Treatment  Options            AND    Care  plan  Were          Reviewed and   Discussed   With  patient. * Goals  And  Expectations  Of  The  Therapy  Discussed   And  Reviewed. *   Benefits   And   Side  Effect  Profile  Of  Medication/s   Were   Discussed                * Need   For  Further   Follow up For  The  Various  Problems Were  discussed. * Results  Of  The  Previous  Diagnostic tests were reviewed and  discussed                   Medical  Decision  Making  Was  Made  Based on the   Complexity  Of  Above  Mentioned  Diagnoses,    Data reviewed             And    Risk  Of  Significant   Co morbidities and   complicating   Factors. Medical  Decision  Was       High    Complexity   Due   To  The  Patient's  Multiple  Symptoms  &  Disease,            Complex  Treatment  Regimen,  Multiple medications           and   Risk  Of   Side  Effects,  Difficulty  In  Medication  Management  And  Diagnostic  Challenges           In  View  Of  The  Associated   Co  Morbid  Conditions   And  Problems. * FALL   PRECAUTIONS. THESE  REVIEWED   AND  DISCUSSED      *  USE   WALKING  ASSISTANCE  DEVICES     QUAD  CANE  /   WALKER          *    SUPERVISED   CARE    *   ABSOLUTELY   NO  DRIVING          *   BE  CAREFUL  WITH  ACTIVITIES            *   ADEQUATE   FLUID  INTAKE   AND  ELECTROLYTE  BALANCE           * KEEP  DAIRY  OF   THE  NEUROLOGICAL  SYMPTOMS        RECORDING THE    DURATION  AND  FREQUENCY. *  AVOID    CONDITIONS  AND  FACTORS   THAT  MAKE                  NEUROLOGICAL  SYMPTOMS  WORSE.         *TO  MAINTAIN  REGULAR  SLEEP  WAKE  CYCLES.      *   TO  HAVE  ADEQUATE  REST  AND   SLEEP    HOURS.        *    AVOID  ANY USAGE OF    TOBACCO,          AVOID  EXCESSIVE ALCOHOL  AND   ILLEGAL   SUBSTANCES          *  CONTINUE   MEDICATIONS    PRESCRIBED       AS    RECOMMENDED       *   Compliance   With  Medications   And  Instructions          * CURRENTLY    TOLERATING  THE  PRESCRIBED   MEDICATIONS. WITHOUT  ANY  SIGNIFICANT  SIDE  EFFECTS   &  GETTING BENEFIT.           *  May   Use  Pill  Box,    If  Needed          *    Prophylactic  Use   Of     Vitamin   B   Complex,  Folic  Acid,    Vitamin  B12    Multivitamin,       Calcium  With  magnesium  And  Vit D    Supplementations   Over  The  Counter  Discussed               *  PATIENT  IS  ALSO   COUNSELED   TO  KEEP    ACTIVITIES:         A)   SIMPLE      B)  ORGANIZED      C)  WRITEDOWN                     *  EVALUATIONS  AND  FOLLOW UP:                   * PHYSICAL  THERAPY                                 *CARDIOLOGY              *  ORTHO                       *     PATIENT    HAD    NEURO  DIAGNOSTIC  EVALUATIONS  IN  FEB. 2022                               A)    CT   HEAD      SHOWED      4  MM    LEFT   HEMISPHERIC                                       SUBDURAL   HYGROMA  /   CHRONIC  SUBDURAL  HEMATOMA                                            WITH  OUT  ANY  PRESSURE  EFFECTS                                        CHRONIC   CEREBRAL  ISCHEMIA       AND  ATROPHY                             B)     CAROTID  DOPPLER      SHOWED                                         NO  SIGNIFICANT  ABNORMALITY                             C)   EEG      AND    VIT  B 12   LEVELS      ARE  PENDING                               -ABOVE   REVIEWED      WITH    PATIENT    AND    HIS  WIFE                         *      RECOMMENDED :                                    A)    SUPERVISED    CARE                               B)    FALL  PRECAUTIONS                                   C)    NEURO  SURGICAL    OPINION                                             -   THEY   REFUSED    THE  SAME                                  D)    TO  CONTINUE ARICEPT  AND  NAMENDA     AS  BEFORE                                  E)    COMPLETE   THE   REMAINING  WORK  UP                                            AS   RECOMMENDED                    *PATIENT   TO  FOLLOW  UP  WITH   PRIMARY  CARE         OTHER  CONSULTANTS  AS  BEFORE.               *TO  FOLLOW  WITH   MENTAL  HEALTH  PROFESSIONALS ,  INCLUDING            PSYCHOLOGICAL  COUNSELING   AND  PSYCHIATRIC  EVALUTIONS,                     *  Maintain   Healthy  Life Style    With   Periodic  Monitoring  Of          Any  Medical  Conditions  Including   Elevated  Blood  Pressure,  Lipid  Profile,        Blood  Sugar levels  AndHeart  Disease. *   Period   Screening  For  Cancers  Involving  Breast,  Colon,         Lungs  And  Other  Organs  As  Applicable,  In consultation   With  Your  Primary Care Providers. *Second  Neurological  Opinion  And  Evaluations  In  [facility] AND [facility]  Setting  If  Patient  Is  Interested. * Please   Contact   Neurology  Clinic   Early   If   Are  Any  New  Neurological   And  Any neurological  Concerns. *  If  The  Patient remains  Neurologically  Stable   Return   To  [facility] Neurology Department   IN     3       MONTHS  TIME   FOR  FURTHER              FOLLOW UP.                       *   The  Neurological   Findings,  Possible  Diagnosis,  Differential diagnoses                    And  Options  For    Further   Investigations                   And  management   Are  Discussed  Comprehensively. Medications   And  Prescription   Risks  And  Side effects  Are   Also  Discussed.                      *  If   There is  Any  Significant  Worsening   Of  Current  Symptoms  And  Or                  If patient  Develops   Any additional  New  NeurologicalSymptoms                  Or  Significant  Concerns   Should  Call  911 or  Go  To  Emergency  Department                  For Further  Immediate  Evaluation and  management . The   Above  Were  Reviewed  With  PATIENT   and                         questions  Answered  In  Detail. Electronically signed by   Davion Connor M.D., [address]. Board Certified in  Neurology &  In  [id number] [place] [address] of Psychiatry and Neurology ([facility])      DISCLAIMER:   Although every effort was made to ensure the accuracy of this  electronictranscription, some errors in transcription may have occurred. GENERAL PATIENT INSTRUCTIONS:      A Healthy Lifestyle: Care Instructions   Your Care Instructions   A healthy lifestyle can help you feel good, stay at ahealthy weight, and have plenty of energy for both work and play. A healthy lifestyle is something you can share with your whole family.  A healthy lifestyle also can lower your risk for serious health problems, such ashigh blood pressure, heart disease, and diabetes.  You can follow a few steps listed below to improve your health and the health of your family.  Follow-up careis a key part of your treatment and safety. Be sure to make and go to all appointments, and call your doctor if you are having problems. Its also a good idea to know your test results and keep a list of the medicines you take.  How can you care for yourself at home?  Do not eat too much sugar, fat, or fast foods. You can still have dessert and treats nowand then. The goal is moderation.  Start small to improve your eating habits. Pay attention to portion sizes, drink less juice and soda pop, and eat more fruits and vegetables.  Eat a healthy amount of food. A 3-ounce serving of meat, for example, is about the size of a deck of cards. Fill the rest of your plate with vegetables and whole grains.  Limit theamount of soda and sports drinks you have every day. Drink more water when you are thirsty.    Eat at least 5 servings of fruits and vegetables every day. It may seem like a lot, but it is not hard to reach this goal. Aserving or helping is 1 piece of fruit, 1 cup of vegetables, or 2 cups of leafy, raw vegetables. Have an apple or some carrot sticks as an afternoon snack instead of a candy bar. Try to have fruits and/or vegetables at everymeal.   Make exercise part of your daily routine. You may want to start with simple activities, such as walking, bicycling, or slow swimming. Try dixon active 30 to 60 minutes every day. You do not need to do all 30 to 60 minutes all at once. For example, you can exercise 3 times a day for 10 or 20 minutes. Moderate exercise is safe for most people, but it is always agood idea to talk to your doctor before starting an exercise program.   Keep moving. Lencho Handy the lawn, work in the garden, or [employer]. Take the stairs instead of the elevator at work.  If you smoke, quit. Peoplewho smoke have an increased risk for heart attack, stroke, cancer, and other lung illnesses. Quitting is hard, but there are ways to boost your chance of quitting tobacco for good.  Use nicotine gum, patches, or lozenges.  Ask your doctor about stop-smoking programs and medicines.  Keep trying.  In addition to reducing your risk of diseases in the future, you will notice some benefits soon after you stop using tobacco. If you have shortness of breath or asthma symptoms, they will likely getbetter within a few weeks after you quit.  Limit how much alcohol you drink. Moderate amounts of alcohol (up to 2 drinks a day for men, 1drink a day for women) are okay. But drinking too much can lead to liver problems, high blood pressure, and other health problems.  health   If you have a family, there are many things you can do together to improve your health.  Eat meals together as a family as often as possible.  Eat healthy foods. This includes fruits, vegetables, lean meats and dairy, and whole grains.    Include your family in your fitness plan. Most peoplethink of activities such as jogging or tennis as the way to fitness, but there are many ways you and your family can be more active. Anything that makes you breathe hard and gets your heart pumping is exercise. Here are sometips:   Walk to do errands or to take your child to school or the bus.  Go for a family bike ride after dinner instead of watching TV.  Where can you learn more?  Go totps://algranoshreyas.eegoes. org and sign in to your [employer] account. Enter U041 in the Search HealthInformation box to learn more about \"A Healthy Lifestyle: Care Instructions. \"     If you do not have anaccount, please click on the \"Sign Up Now\" link.  Current as of: July 26, 2016   Content Version: 11.2   © 2772-6775 [employer]. Care instructions adapted under license by [facility] ([facility]). If you have questions about a medical condition or this instruction, always ask your healthcare professional. [employer] disclaims any warranty or liability for your use of this information.

## 2022-02-17 LAB
FOLATE: 8.1 NG/ML
VITAMIN B-12: 1344 PG/ML (ref 232–1245)

## 2022-03-04 ENCOUNTER — HOSPITAL ENCOUNTER (OUTPATIENT)
Dept: NEUROLOGY | Age: 77
Discharge: HOME | End: 2022-03-04
Payer: MEDICARE

## 2022-03-04 DIAGNOSIS — I10 ESSENTIAL HYPERTENSION: ICD-10-CM

## 2022-03-04 DIAGNOSIS — R26.89 BALANCE PROBLEM: ICD-10-CM

## 2022-03-04 DIAGNOSIS — F03.90 DEMENTIA WITHOUT BEHAVIORAL DISTURBANCE, UNSPECIFIED DEMENTIA TYPE: ICD-10-CM

## 2022-03-04 DIAGNOSIS — I67.82 CHRONIC CEREBRAL ISCHEMIA: ICD-10-CM

## 2022-03-04 DIAGNOSIS — R42 DIZZINESS: ICD-10-CM

## 2022-03-04 DIAGNOSIS — R26.9 GAIT DIFFICULTY: ICD-10-CM

## 2022-03-04 DIAGNOSIS — G93.89 BRAIN DYSFUNCTION: ICD-10-CM

## 2022-03-04 PROCEDURE — 95813 EEG EXTND MNTR 61-119 MIN: CPT

## 2022-03-04 PROCEDURE — 93886 INTRACRANIAL COMPLETE STUDY: CPT

## 2022-03-04 PROCEDURE — 93892 TCD EMBOLI DETECT W/O INJ: CPT

## 2022-03-04 NOTE — PROGRESS NOTES
EXTENDED EEG Completed with Jamin Analysis. PCP: Corrin Severe, DO    Ordering: Jb Champion.  Robert Neurologist    Interpreting Physician: Sepideh Azul Neurologist    Technician: Eleazar Becker RN

## 2022-03-04 NOTE — PROGRESS NOTES
TCD Completed with Emboli Detection. PCP: Nora Lim DO    Ordering: Ralph Colorado. Robert Neurologist    Interpreting Physician: Ralph Colorado.  Karla Jones    Electronically signed by Shania Rothman RN on 3/4/2022 at 1:23 PM

## 2022-03-08 NOTE — PROCEDURES
[address] [id number]                             Transcranial Doppler (TCD)      PATIENT NAME: Fannie Macdonald                       :        1945  MED REC NO:   [id number]                             ROOM:  ACCOUNT NO:   [de-identified]                           ADMIT DATE: 2022    PROVIDER:     Italo Kovacs MD    DATE OF STUDY:  2022      TECHNIQUE:  Transcranial Doppler study of intracranial arteries was  performed using [employer] equipment with digital Doppler technology, with  high resolution [address] M-mode display and Multigate Spectral Windows  and 2 MHz Doppler probes via temporal, suboccipital and orbital  approaches. Transcranial Doppler study of the intracranial arteries was also  performed for emboli detection without intravenous microbubble injection  using continuous soundtrack, M-Mode with Multigate Spectral displays and  soundtrack displays with continuous bilateral monitoring. CLINICAL DATA:      The patient is 68years old with a history of  hypertension, hyperlipidemia, cardiac arrhythmia, dementia, gait  difficulty, balance problem, dizziness, confusion, subdural hygroma. The purpose of the study is to evaluate for stroke, intracranial focal  stenosis, flow abnormalities, vertebrobasilar insufficiency evaluations. SUMMARY:      The mean flow velocities in the right and left anterior,  middle, and posterior cerebral arteries are low with elevated PI values  diffusely. Mean flow velocities in the right and left vertebral arteries, basilar  artery are low with borderline PI values. TCD OF INTRACRANIAL ARTERIES FOR EMBOLI DETECTION:      Review and analysis of the waveforms and continuous soundtrack systems  during the current monitoring show no evidence of HITS (high intensity  transient signals) and no embolic shower events were detected. IMPRESSION:      1.   There is moderate diffuse intracranial small vessel disease process. 2.  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 3.  TCD of intracranial arteries for emboli detection is negative. Further clinical correlation and followup recommended. Minoo Vickers MD, [address] [place] [address]     Board Certified in Neurology  & in  [id number] of Psychiatry and Neurology ([facility]).               D: 03/08/2022 11:12:19       T: 03/08/2022 12:04:20     [email or web address]  Job#: [id number]     Doc#: [id number]    CC:  Elliot Aguirre

## 2022-03-08 NOTE — PROCEDURES
[address] [place] [id number]                         ELECTROENCEPHALOGRAM (EEG) REPORT      PATIENT NAME: Fannie Macdonald                       :        1945  MED REC NO:   [id number]                             ROOM:  ACCOUNT NO:   [de-identified]                           ADMIT DATE: 2022    PROVIDER:     Italo Kovacs MD      DATE OF STUDY:  2022      TECHNIQUE:  23 channels of EEG, 2 channels of EOG, 2 channel of EKG and  1 channel ground and 1 channel reference were recorded with [employer]  Software 32 Channel System utilizing the International 10/20 System  Protocol. Jamin detection and seizure analysis protocols were utilized and the  study was reviewed using the comprehensive EEG monitoring. This is an extended EEG recorded for 1 hour and 2 minutes. CLINICAL DATA:      The patient is 68years old with a history of dementia,  gait difficulties, balance problems, confusion, dizziness, cerebral  atrophy, subdural hygroma. The purpose of the study is to evaluate for associated seizure activity. MEDICATIONS:  Celexa, Namenda, Aricept. BACKGROUND ACTIVITY:      While the patient was awake, the background  activity consisted of fairly-regulated low-amplitude 11 Hz waveform seen  over both cerebral hemispheres. There are intermittent eye and frontal muscle artifacts noted. ACTIVATION PROCEDURES:    HYPERVENTILATION:  Could not be performed. PHOTIC STIMULATION:  Photic stimulation was performed at the following  frequencies: 1 Hz, 3 Hz, 6 Hz, 9 Hz, 12 Hz, 15 Hz, 18 Hz, 21 Hz, 25 Hz,  30 Hz and patient tolerated well. Photic stimulation:  Very mild driving response noted. SLEEP:  Stage I and stage II sleep were noted. EKG:  EKG showed normal sinus rhythm without any significant  abnormality.         IMPRESSION:        No significant focal, lateralized or epileptiform features noted during the current recording. Further clinical correlation and followup recommended. Can Gilliam MD, [address] [place] [address]     Board Certified in Neurology  & in  [id number] of Psychiatry and Neurology ([facility]).            D: 03/08/2022 10:26:51       T: 03/08/2022 11:33:32     [email or web address]  Job#: [id number]     Doc#: [id number]    CC:  Reji Fox

## 2022-03-11 ENCOUNTER — OFFICE VISIT (OUTPATIENT)
Dept: NEUROLOGY | Age: 77
End: 2022-03-11
Payer: MEDICARE

## 2022-03-11 VITALS
SYSTOLIC BLOOD PRESSURE: 112 MMHG | HEIGHT: 69 IN | BODY MASS INDEX: 31.7 KG/M2 | WEIGHT: 214 LBS | DIASTOLIC BLOOD PRESSURE: 80 MMHG

## 2022-03-11 DIAGNOSIS — I48.0 PAROXYSMAL ATRIAL FIBRILLATION (HCC): ICD-10-CM

## 2022-03-11 DIAGNOSIS — F03.90 DEMENTIA WITHOUT BEHAVIORAL DISTURBANCE, UNSPECIFIED DEMENTIA TYPE: Primary | ICD-10-CM

## 2022-03-11 DIAGNOSIS — R26.89 BALANCE PROBLEM: ICD-10-CM

## 2022-03-11 DIAGNOSIS — R42 DIZZINESS: ICD-10-CM

## 2022-03-11 DIAGNOSIS — G96.08 NONTRAUMATIC SUBDURAL HYGROMA: ICD-10-CM

## 2022-03-11 DIAGNOSIS — R26.9 GAIT DIFFICULTY: ICD-10-CM

## 2022-03-11 DIAGNOSIS — I10 ESSENTIAL HYPERTENSION: ICD-10-CM

## 2022-03-11 DIAGNOSIS — G31.9 ACQUIRED CEREBRAL ATROPHY (HCC): ICD-10-CM

## 2022-03-11 PROCEDURE — G8427 DOCREV CUR MEDS BY ELIG CLIN: HCPCS | Performed by: PSYCHIATRY & NEUROLOGY

## 2022-03-11 PROCEDURE — G8484 FLU IMMUNIZE NO ADMIN: HCPCS | Performed by: PSYCHIATRY & NEUROLOGY

## 2022-03-11 PROCEDURE — 4040F PNEUMOC VAC/ADMIN/RCVD: CPT | Performed by: PSYCHIATRY & NEUROLOGY

## 2022-03-11 PROCEDURE — 99213 OFFICE O/P EST LOW 20 MIN: CPT | Performed by: PSYCHIATRY & NEUROLOGY

## 2022-03-11 PROCEDURE — 1036F TOBACCO NON-USER: CPT | Performed by: PSYCHIATRY & NEUROLOGY

## 2022-03-11 PROCEDURE — G8417 CALC BMI ABV UP PARAM F/U: HCPCS | Performed by: PSYCHIATRY & NEUROLOGY

## 2022-03-11 PROCEDURE — 1123F ACP DISCUSS/DSCN MKR DOCD: CPT | Performed by: PSYCHIATRY & NEUROLOGY

## 2022-03-11 ASSESSMENT — ENCOUNTER SYMPTOMS
SHORTNESS OF BREATH: 0
CHEST TIGHTNESS: 0
PHOTOPHOBIA: 0
TROUBLE SWALLOWING: 0
CHOKING: 0
VISUAL CHANGE: 0
BLOOD IN STOOL: 0
CONSTIPATION: 0
VOICE CHANGE: 0
SINUS PRESSURE: 0
SORE THROAT: 0
DIARRHEA: 0
FACIAL SWELLING: 0
ABDOMINAL PAIN: 0
EYE PAIN: 0
BOWEL INCONTINENCE: 0
BACK PAIN: 0
EYE REDNESS: 0
ABDOMINAL DISTENTION: 0
COUGH: 0
APNEA: 0
EYE ITCHING: 0
VOMITING: 0
NAUSEA: 0
COLOR CHANGE: 0
WHEEZING: 0
EYE DISCHARGE: 0

## 2022-03-11 NOTE — PROGRESS NOTES
[facility]  Neurology    [address] [address]  OLEKW:[phone]   Fax: [phone]        SUBJECTIVE:       PATIENT ID:  Tho Adams is a  RIGHT     HANDED 68 y.o. male. Neurologic Problem  The patient's primary symptoms include clumsiness, focal sensory loss, focal weakness, a loss of balance, memory loss and weakness. The patient's pertinent negatives include no altered mental status, near-syncope, slurred speech, syncope or visual change. This is a chronic problem. Episode onset: WORSE  SINCE   3  YEARS. The problem has been gradually worsening since onset. There was no focality noted. Associated symptoms include bladder incontinence, confusion, dizziness and light-headedness. Pertinent negatives include no abdominal pain, auditory change, back pain, bowel incontinence, chest pain, diaphoresis, fatigue, fever, headaches, nausea, neck pain, palpitations, shortness of breath or vomiting. Past treatments include medication. The treatment provided mild relief. His past medical history is significant for dementia and mood changes. There is no history of a bleeding disorder, a clotting disorder, a CVA, head trauma, liver disease or seizures. History obtained from  The   PATIENT      AND   HIS  WIFE       and other  available   medical  records   were  Also  reviewed. The  Duration,  Quality,  Severity,  Location,  Timing,  Context,  Modifying  Factors   Of   The   Chief   Complaint       And  Present  Illness  Was   Reviewed   In   Chronological   Manner. PATIENT'S  MAIN  CONCERNS INCLUDE :                         1)     KNOWN     H/O    PROGRESSIVE    DEMENTIA   FOR   10   YEARS                                     AS  PER    PATIENT'S    WIFE.                                     FAMILY      H/O   DEMENTIA      ON   HIS  MATERNAL  SIDE                                         IN  MULTIPLE EEG      AND    VIT  B 12   LEVELS      ARE                                      SHOWED  NO SIGNIFICANT  ABNORMALITIES                               -ABOVE   REVIEWED      WITH    PATIENT    AND    HIS  WIFE                         11)       RECOMMENDED :                                    A)    SUPERVISED    CARE                               B)    FALL  PRECAUTIONS                                   C)    NEURO  SURGICAL    OPINION                                             -   THEY   REFUSED    THE  SAME                                  D)    TO  CONTINUE   ARICEPT  AND  NAMENDA     AS  BEFORE                                            12)       VARIOUS  RISK   FACTORS   WERE  REVIEWED   AND   DISCUSSED. PATIENT   HAS  MULTIPLE   MEDICAL, NEUROLOGICAL                        AND   MENTAL HEALTH   PROBLEMS . PATIENT'S   MANAGEMENT  IS  CHALLENGING.                                         PRECIPITATING  FACTORS: including  fever/infection, exertion/relaxation, position change, stress,                weather change,   medications/alcohol, time of day/darkness/light  Are  present                                                          MODIFYING  FACTORS:  fever/infection, exertion/relaxation, position change, stress, weather change,               medications/alcohol, time of day/darkness/light  Are  present                Patient   Indicates   The  Presence   And  The  Absence  Of  The  Following    Associated  And             Additional  Neurological    Symptoms:                                Balance  And coordination   problems  present           Gait problems     present            Headaches      absent              Migraines           absent           Memory problemspresent              Confusion        present            Paresthesia   numbness          present           Seizures  And  Starring  Episodes           absent           Syncope,  Near  syncopal episodes absent           Speech   problems           absent             Swallowing   Problems      absent            Dizziness,  Light headedness           present              Vertigo        absent             Generalized   Weakness    present              focal  Weakness     absent             Tremors         absent              Sleep  Problems     absent             History  Of   Recent  Head  Injury     absent             History  Of   Recent  TIA     absent             History  Of   Recent    Stroke     absent             Neck  Pain   and   Neck muscle  Spasms  absent               Radiating  down   And   Weakness           absent            Lower back   Pain  And     Spasms  absent              Radiating    Down   And   Weakness          absent                H/OFALLS        absent               History  Of   Visual  Symptoms    absent                  Associated   Diplopia       absent                                               Also   Additional   Symptoms   Present    As  Documented    In   The   detailed                  Review  Of  Systems   And    Please   Refer   To    Them for   Additional    Information. Any components  That are either  Unobtainable  Or  Limited  In   HPI, ROS  And/or PFSH   Are                   Due   ToPatient's  Medical  Problems,  Clinical  Condition   and/or lack of                                 other    Alternate   resources.             RECORDS   REVIEWED:    historical medical records           INFORMATION   REVIEWED:     MEDICAL   HISTORY,SURGICAL   HISTORY,     MEDICATIONS   LIST,   ALLERGIES AND  DRUG  INTOLERANCES,       FAMILY   HISTORY,  SOCIAL  HISTORY,      PROBLEM  LIST   FOR  PATIENT  CARE   COORDINATION          Past Medical History:   Diagnosis Date    Diverticulosis     Hyperlipidemia     Low HDL    Hyperuricemia     Obesity     Osteoarthrosis     not designated as generalized or localized    Overactive bladder     Prostate cancer ([facility] [address] 2008  Third degree heart block ([facility] [address] 2017         Past Surgical History:   Procedure Laterality Date    A-V CARDIAC PACEMAKER INSERTION      Dr Adina Joel; [facility]    COLONOSCOPY  2007    diverticulosis    CYSTOSCOPY  2019    with Botox    FIBULA FRACTURE SURGERY Right 2000    [facility]    HIP ARTHROPLASTY Bilateral ,    Dr Feng Aguilar  2017    PROSTATE BIOPSY  07/10/2008    PROSTATECTOMY  2008    w/lymph node dissection Dr Evin Neil Right 10/2013    Dr Christian Amor,     Flexible         Current Outpatient Medications   Medication Sig Dispense Refill    mirabegron (MYRBETRIQ) 25 MG TB24 take 1 tablet by mouth once daily 90 tablet 0    citalopram (CELEXA) 20 MG tablet take 1 tablet by mouth once daily 30 tablet 5    memantine (NAMENDA) 10 MG tablet Take 1 tablet by mouth 2 times daily 60 tablet 5    donepezil (ARICEPT) 5 MG tablet take 1 tablet by mouth nightly 90 tablet 1    oxybutynin (DITROPAN-XL) 5 MG extended release tablet take 1 tablet by mouth once daily      Glucosamine-Chondroit-Vit C-Mn (GLUCOSAMINE 1500 COMPLEX PO) Take by mouth      CPAP Machine MISC by Does not apply route Pressure of 13      vitamin B-12 (CYANOCOBALAMIN) 1000 MCG tablet Take 1,000 mcg by mouth daily      ELIQUIS 5 MG TABS tablet Take 5 mg by mouth 2 times daily   0    Coenzyme Q10 10 MG CAPS Take 1 capsule by mouth daily       No current facility-administered medications for this visit.          Allergies   Allergen Reactions    Pravastatin          Family History   Problem Relation Age of Onset    Alzheimer's Disease Mother     Emphysema Father     Other Father         vascular disease- of aneurysm         Social History     Socioeconomic History    Marital status:      Spouse name: Not on file    Number of children: Not on file    Years of education: Not on file    Highest education level: Not on file Occupational History    Not on file   Tobacco Use    Smoking status: Former Smoker     Packs/day: 2.00     Years: 15.00     Pack years: 30.00     Quit date: 10/18/1981     Years since quittin.4    Smokeless tobacco: Never Used   Substance and Sexual Activity    Alcohol use: No    Drug use: No    Sexual activity: Not on file   Other Topics Concern    Not on file   Social History Narrative    Not on file     Social Determinants of Health     Financial Resource Strain: Low Risk     Difficulty of Paying Living Expenses: Not hard at all   Food Insecurity: No Food Insecurity    Worried About [facility] in the Last Year: Never true    [facility] in the Last Year: Never true   Transportation Needs:     Lack of Transportation (Medical): Not on file    Lack of Transportation (Non-Medical):  Not on file   Physical Activity:     Days of Exercise per Week: Not on file    Minutes of Exercise per Session: Not on file   Stress:     Feeling of Stress : Not on file   Social Connections:     Frequency of Communication with Friends and Family: Not on file    Frequency of Social Gatherings with Friends and Family: Not on file    Attends [employer] Services: Not on file    Active Member of [address] [place] [address] or Organizations: Not on file    Attends Club or Organization Meetings: Not on file    Marital Status: Not on file   Intimate Partner Violence:     Fear of Current or Ex-Partner: Not on file    Emotionally Abused: Not on file    Physically Abused: Not on file    Sexually Abused: Not on file   Housing Stability:     Unable to Pay for Housing in the Last Year: Not on file    Number of [place] in the Last Year: Not on file    Unstable Housing in the Last Year: Not on file       Vitals:    22 1449   BP: 112/80         Wt Readings from Last 3 Encounters:   22 214 lb (97.1 kg)   22 208 lb (94.3 kg)   22 208 lb (94.3 kg)         BP Readings from Last 3 Encounters:   22 112/80   02/16/22 132/70   02/11/22 112/60           Hematology and Coagulation    Lab Results   Component Value Date    WBC 7.3 01/28/2022    WBC 6.8 12/30/2019    RBC 4.88 01/28/2022    HGB 14.7 01/28/2022    HCT 41.9 01/28/2022    MCV 86.0 01/28/2022    MCH 30.1 01/28/2022    MCHC 35.0 01/28/2022    RDW 10.5 01/28/2022    .8 01/28/2022    MPV 13.1 12/30/2019       No results found for: ESR    Chemistries    Lab Results   Component Value Date     01/28/2022    K 4.3 01/28/2022     01/28/2022    CO2 28 01/28/2022    BUN 16 01/28/2022    CREATININE 0.9 01/28/2022    CALCIUM 9.3 01/28/2022    PROT 7.0 12/30/2019    LABALBU 4.0 01/28/2022    BILITOT 0.7 01/28/2022    ALKPHOS 58 01/28/2022    AST 23 01/28/2022    ALT 12 01/28/2022     Lab Results   Component Value Date    ALKPHOS 58 01/28/2022    ALT 12 01/28/2022    AST 23 01/28/2022    PROT 7.0 12/30/2019    BILITOT 0.7 01/28/2022    BILIDIR 0.0 04/16/2018    LABALBU 4.0 01/28/2022     Lab Results   Component Value Date    BUN 16 01/28/2022    CREATININE 0.9 01/28/2022     Lab Results   Component Value Date    CALCIUM 9.3 01/28/2022     Lab Results   Component Value Date    AST 23 01/28/2022    ALT 12 01/28/2022       Lab Results   Component Value Date    CKTOTAL 120 08/25/2017       Review of Systems   Constitutional: Negative for appetite change, chills, diaphoresis, fatigue, fever and unexpected weight change. HENT: Negative for congestion, dental problem, drooling, ear discharge, ear pain, facial swelling, hearing loss, mouth sores, nosebleeds, postnasal drip, sinus pressure, sore throat, tinnitus, trouble swallowing and voice change. Eyes: Negative for photophobia, pain, discharge, redness, itching and visual disturbance. Respiratory: Negative for apnea, cough, choking, chest tightness, shortness of breath and wheezing. Cardiovascular: Negative for chest pain, palpitations, leg swelling and near-syncope.    Gastrointestinal: Negative for abdominal distention, abdominal pain, blood in stool, bowel incontinence, constipation, diarrhea, nausea and vomiting. Endocrine: Negative for cold intolerance, heat intolerance, polydipsia, polyphagia and polyuria. Genitourinary: Positive for bladder incontinence. Musculoskeletal: Positive for arthralgias and gait problem. Negative for back pain, joint swelling, myalgias, neck pain and neck stiffness. Skin: Negative for color change, pallor, rash and wound. Allergic/Immunologic: Negative for environmental allergies, food allergies and immunocompromised state. Neurological: Positive for dizziness, focal weakness, weakness, light-headedness, numbness and loss of balance. Negative for tremors, seizures, syncope, facial asymmetry, speech difficulty and headaches. Hematological: Negative for adenopathy. Does not bruise/bleed easily. Psychiatric/Behavioral: Positive for confusion, decreased concentration and memory loss. Negative for agitation, behavioral problems, dysphoric mood, hallucinations, self-injury, sleep disturbance and suicidal ideas. The patient is nervous/anxious. The patient is not hyperactive. OBJECTIVE:      Physical Exam  Constitutional:       Appearance: He is well-developed. HENT:      Head: Normocephalic and atraumatic. No raccoon eyes or Cruz's sign. Right Ear: External ear normal.      Left Ear: External ear normal.      Nose: Nose normal.   Eyes:      Conjunctiva/sclera: Conjunctivae normal.      Pupils: Pupils are equal, round, and reactive to light. Neck:      Thyroid: No thyroid mass or thyromegaly. Vascular: No carotid bruit. Trachea: No tracheal deviation. Meningeal: Brudzinski's sign and Kernig's sign absent. Cardiovascular:      Rate and Rhythm: Normal rate and regular rhythm. Pulmonary:      Effort: Pulmonary effort is normal.   Musculoskeletal:         General: No tenderness.       Cervical back: Normal range of motion and neck supple. No rigidity. No muscular tenderness. Normal range of motion. Skin:     General: Skin is warm. Coloration: Skin is not pale. Findings: No erythema or rash. Nails: There is no clubbing. Psychiatric:         Attention and Perception: He is attentive. Mood and Affect: Mood is anxious and depressed. Affect is not labile, blunt or inappropriate. Speech: He is communicative. Speech is delayed. Speech is not rapid and pressured, slurred or tangential.         Behavior: Behavior is slowed. Behavior is not agitated, aggressive, withdrawn, hyperactive or combative. Behavior is cooperative. Thought Content: Thought content is not paranoid or delusional. Thought content does not include homicidal or suicidal ideation. Thought content does not include homicidal or suicidal plan. Cognition and Memory: Cognition is impaired. Memory is impaired. He exhibits impaired recent memory and impaired remote memory. NEUROLOGICALEXAMINATION :     A) MENTAL STATUS:                   Alert and  oriented  To    place  And  Person. No Aphasia. Able   To  Follow     SIMPLE    commands                     No right  To left confusion. SLOW   Speech  And language function. Insight and  Judgment ,Fund  Of  Knowledge    DECREASED                  Recent  And  Remote memory   DECREASED                  Attention &  Concentration are   DECREASED                         B) CRANIAL NERVES :        CN : Visual  Acuity  And  Visual fields    DECREASED                 Fundi  Could  Not  Be  Could  Not  Be  Evaluated. 3,4,6 CN : Both  Pupils are   Reactive and  Equal.  Movements  Are  Intact. No  Nystagmus. No  JASPER. No  Afferent  Pupillary  Defect noted. 5 CN :  Normal  Facial sensations and Corneal  Reflexes           7 CN:  Normal  Facial  Symmetry  And  Strength.   No facial  Weakness. 8 CN :  Hearing  Appears   DECREASED            9, 10 CN: Normal   spontaneous, reflex   palate   movements         11 CN:   Normal  Shoulder  shrug and  strength         12 CN :   Normal  Tongue movements and  Tongue  In midline                        No tongue   Fasciculations or atrophy       C) MOTOR  EXAM:                 Strength  In upper  And  Lower   extremities   4 +/5    DECREASED                            Rapid   alternating  And  repetitions  Movements    DECREASED                   Muscle  Tone  In upper  And  Lower  Extremities  normal                No rigidity. No  Spasticity. Bradykinesia   absent                 No  Asterixis.               Sustention  Tremor , Resting   Tremor   absent                    No   other  Abnormal  Movements noted           D) SENSORY :               Light   touch, pinprick,   position  And  Vibration    DECREASED        E) REFLEXES:                   Deep  Tendon  Reflexes   ABNORMAL                                     F) COORDINATION  AND  GAIT :                                Station and  Gait    SLOW  UNSTEADY                                Romberg 's test    CANNOT  BE PERFORMED                            Ataxia negative        ASSESSMENT:      Patient Active Problem List   Diagnosis    Hyperlipidemia    Essential hypertension    Malignant [facility]oplasm of prostate ([facility] [address]    Diverticulosis of intestine without perforation or abscess without bleeding    Osteoarthritis of knee    Third degree heart block (HCC)    Sick sinus syndrome (HCC)    Paroxysmal atrial fibrillation (HCC)    SHONDA treated with BiPAP    Depression, major, recurrent, mild (HCC)    Dementia without behavioral disturbance (HCC)    BMI 32.0-32.9,adult    Gait difficulty    Balance problem    Chronic cerebral ischemia    Risk for falls    Confusion    Dizziness    Acquired cerebral atrophy (HCC)    Nontraumatic subdural hygroma           [facility] at 6:11   pm on 2/14/2022to be communicated to a licensed caregiver.                 DATE OF EXAM:  08/25/2017        EXAM:  CT of the brain        CLINICAL HISTORY:  A 42-year-old male with mental status changes.        TECHNIQUE:  Axial images obtained along with multiplanar reconstructed images from the 3D data set that includes coronal and sagittal views.  Automatic exposure control techniques were used to obtain the study.  CT of the brain without contrast.       COMPARISON:  No relevant prior studies available.       FINDINGS:         Hemorrhage:  No evidence of acute hemorrhage. Brain:  Mild neurodegenerative effects of aging.  No mass, mass effect, or midline shift.     Ventricles:  No hydrocephalus.     Bones:  No fractures      Sinuses and Mastoids:  Clear.        IMPRESSION:     1.  No acute intracranial pathology.         DATE DICTATED:   08/25/2017          VISITING DIAGNOSIS:      ICD-10-CM    1. Dementia without behavioral disturbance, unspecified dementia type ([facility] [address]  F03.90    2. Paroxysmal atrial fibrillation (HCC)  I48.0    3. Acquired cerebral atrophy ([address]  G31.9    4. Gait difficulty  R26.9    5. Dizziness  R42    6. Balance problem  R26.89    7. Nontraumatic subdural hygroma  G96.08    8. Essential hypertension  I10                 CONCERNS   &   INCREASED   RISK   FOR         * TIA,  CEREBRO  VASCULAR  ISCHEMIA, STROKE     *   DIZZINESS,   VERTEBROBASILAR  INSUFFICIENCY ,       *   ORTHOSTATIC  INTOLERANCE,         *   COGNITIVE  &   MEMORY PROBLEMS  AND  DEMENTIAS         *  GAIT  DIFFICULTIES  &   BALANCE PROBLMES   AND  FALL                VARIOUS  RISK   FACTORS   WERE  REVIEWED   AND   DISCUSSED. *  PATIENT   HAS  MULTIPLE   MEDICAL, NEUROLOGICAL                        AND   MENTAL HEALTH   PROBLEMS           PATIENT'S   MANAGEMENT  IS  CHALLENGING.             PLAN:                         Delmy López  Of  The  Diagnoses,  The  Management & Treatment  Options AND    Care  plan  Were          Reviewed and   Discussed   With  patient. * Goals  And  Expectations  Of  The  Therapy  Discussed   And  Reviewed. *   Benefits   And   Side  Effect  Profile  Of  Medication/s   Were   Discussed                * Need   For  Further   Follow up For  The  Various  Problems Were  discussed. * Results  Of  The  Previous  Diagnostic tests were reviewed and  discussed                   Medical  Decision  Making  Was  Made  Based on the   Complexity  Of  Above  Mentioned  Diagnoses,    Data reviewed             And    Risk  Of  Significant   Co morbidities and   complicating   Factors. Medical  Decision  Was       High    Complexity   Due   To  The  Patient's  Multiple  Symptoms  &  Disease,            Complex  Treatment  Regimen,  Multiple medications           and   Risk  Of   Side  Effects,  Difficulty  In  Medication  Management  And  Diagnostic  Challenges           In  View  Of  The  Associated   Co  Morbid  Conditions   And  Problems. * FALL   PRECAUTIONS. THESE  REVIEWED   AND  DISCUSSED      *  USE   WALKING  ASSISTANCE  DEVICES     QUAD  CANE  /   WALKER          *    SUPERVISED   CARE    *   ABSOLUTELY   NO  DRIVING          *   BE  CAREFUL  WITH  ACTIVITIES            *   ADEQUATE   FLUID  INTAKE   AND  ELECTROLYTE  BALANCE           * KEEP  DAIRY  OF   THE  NEUROLOGICAL  SYMPTOMS        RECORDING THE    DURATION  AND  FREQUENCY. *  AVOID    CONDITIONS  AND  FACTORS   THAT  MAKE                  NEUROLOGICAL  SYMPTOMS  WORSE.         *TO  MAINTAIN  REGULAR  SLEEP  WAKE  CYCLES.      *   TO  HAVE  ADEQUATE  REST  AND   SLEEP    HOURS.        *    AVOID  ANY USAGE OF    TOBACCO,          AVOID  EXCESSIVE  ALCOHOL  AND   ILLEGAL   SUBSTANCES          *  CONTINUE   MEDICATIONS    PRESCRIBED       AS    RECOMMENDED       *   Compliance   With  Medications   And  Instructions          * CURRENTLY    TOLERATING  THE  PRESCRIBED   MEDICATIONS. WITHOUT  ANY  SIGNIFICANT  SIDE  EFFECTS   &  GETTING BENEFIT.           *  May   Use  Pill  Box,    If  Needed          *    Prophylactic  Use   Of     Vitamin   B   Complex,  Folic  Acid,    Vitamin  B12    Multivitamin,       Calcium  With  magnesium  And  Vit D    Supplementations   Over  The  Counter  Discussed               *  PATIENT  IS  ALSO   COUNSELED   TO  KEEP    ACTIVITIES:         A)   SIMPLE      B)  ORGANIZED      C)  WRITEDOWN                     *  EVALUATIONS  AND  FOLLOW UP:                   * PHYSICAL  THERAPY                                 *CARDIOLOGY              *  ORTHO                       *     PATIENT    HAD    NEURO  DIAGNOSTIC  EVALUATIONS  IN  FEB. 2022                               A)    CT   HEAD      SHOWED      4  MM    LEFT   HEMISPHERIC                                       SUBDURAL   HYGROMA  /   CHRONIC  SUBDURAL  HEMATOMA                                            WITH  OUT  ANY  PRESSURE  EFFECTS                                        CHRONIC   CEREBRAL  ISCHEMIA       AND  ATROPHY                             B)     CAROTID  DOPPLER      SHOWED                                         NO  SIGNIFICANT  ABNORMALITY                             C)   EEG      AND    VIT  B 12   LEVELS       SHOWED                                     NO  SIGNIFICANT  ABNORMALITIES                               -ABOVE   REVIEWED      WITH    PATIENT    AND    HIS  WIFE                         *      RECOMMENDED :                                    A)    SUPERVISED    CARE                               B)    FALL  PRECAUTIONS                                   C)    NEURO  SURGICAL    OPINION                                             -   THEY   REFUSED    THE  SAME                                  D)    TO  CONTINUE   ARICEPT  AND  NAMENDA     AS  BEFORE                                            *PATIENT   TO  FOLLOW  UP  WITH PRIMARY  CARE         OTHER  CONSULTANTS  AS  BEFORE.               *TO  FOLLOW  WITH   MENTAL  HEALTH  PROFESSIONALS ,  INCLUDING            PSYCHOLOGICAL  COUNSELING   AND  PSYCHIATRIC  EVALUTIONS,                     *  Maintain   Healthy  Life Style    With   Periodic  Monitoring  Of          Any  Medical  Conditions  Including   Elevated  Blood  Pressure,  Lipid  Profile,        Blood  Sugar levels  AndHeart  Disease. *   Period   Screening  For  Cancers  Involving  Breast,  Colon,         Lungs  And  Other  Organs  As  Applicable,  In consultation   With  Your  Primary Care Providers. *Second  Neurological  Opinion  And  Evaluations  In  [facility]  Setting  If  Patient  Is  Interested. * Please   Contact   Neurology  Clinic   Early   If   Are  Any  New  Neurological   And  Any neurological  Concerns. *  If  The  Patient remains  Neurologically  Stable   Return   To  [facility] Neurology Department   IN     3 -  6        MONTHS  TIME   FOR  FURTHER              FOLLOW UP.                       *   The  Neurological   Findings,  Possible  Diagnosis,  Differential diagnoses                    And  Options  For    Further   Investigations                   And  management   Are  Discussed  Comprehensively. Medications   And  Prescription   Risks  And  Side effects  Are   Also  Discussed. *  If   There is  Any  Significant  Worsening   Of  Current  Symptoms  And  Or                  If patient  Develops   Any additional  New  NeurologicalSymptoms                  Or  Significant  Concerns   Should  Call  911 or  Go  To  Emergency  Department                  For  Further  Immediate  Evaluation and  management . The   Above  Were  Reviewed  With  PATIENT   and                         questions  Answered  In  Detail. Electronically signed by   Jeana Gauthier M.D., [facility]. Board Certified in  Neurology &  In  [facility] ([facility])      DISCLAIMER:   Although every effort was made to ensure the accuracy of this  electronictranscription, some errors in transcription may have occurred. GENERAL PATIENT INSTRUCTIONS:      A Healthy Lifestyle: Care Instructions   Your Care Instructions   A healthy lifestyle can help you feel good, stay at ahealthy weight, and have plenty of energy for both work and play. A healthy lifestyle is something you can share with your whole family.  A healthy lifestyle also can lower your risk for serious health problems, such ashigh blood pressure, heart disease, and diabetes.  You can follow a few steps listed below to improve your health and the health of your family.  Follow-up careis a key part of your treatment and safety. Be sure to make and go to all appointments, and call your doctor if you are having problems. Its also a good idea to know your test results and keep a list of the medicines you take.  How can you care for yourself at home?  Do not eat too much sugar, fat, or fast foods. You can still have dessert and treats nowand then. The goal is moderation.  Start small to improve your eating habits. Pay attention to portion sizes, drink less juice and soda pop, and eat more fruits and vegetables.  Eat a healthy amount of food. A 3-ounce serving of meat, for example, is about the size of a deck of cards. Fill the rest of your plate with vegetables and whole grains.  Limit theamount of soda and sports drinks you have every day. Drink more water when you are thirsty.  Eat at least 5 servings of fruits and vegetables every day. It may seem like a lot, but it is not hard to reach this goal. Aserving or helping is 1 piece of fruit, 1 cup of vegetables, or 2 cups of leafy, raw vegetables.  Have an apple or some carrot sticks as an afternoon snack instead of a candy bar. Try to have fruits and/or vegetables at everymeal.   Make exercise part of your daily routine. You may want to start with simple activities, such as walking, bicycling, or slow swimming. Try dixon active 30 to 60 minutes every day. You do not need to do all 30 to 60 minutes all at once. For example, you can exercise 3 times a day for 10 or 20 minutes. Moderate exercise is safe for most people, but it is always agood idea to talk to your doctor before starting an exercise program.   Keep moving. [place] the lawn, work in the garden, or [employer]. Take the stairs instead of the elevator at work.  If you smoke, quit. Peoplewho smoke have an increased risk for heart attack, stroke, cancer, and other lung illnesses. Quitting is hard, but there are ways to boost your chance of quitting tobacco for good.  Use nicotine gum, patches, or lozenges.  Ask your doctor about stop-smoking programs and medicines.  Keep trying.  In addition to reducing your risk of diseases in the future, you will notice some benefits soon after you stop using tobacco. If you have shortness of breath or asthma symptoms, they will likely getbetter within a few weeks after you quit.  Limit how much alcohol you drink. Moderate amounts of alcohol (up to 2 drinks a day for men, 1drink a day for women) are okay. But drinking too much can lead to liver problems, high blood pressure, and other health problems.  health   If you have a family, there are many things you can do together to improve your health.  Eat meals together as a family as often as possible.  Eat healthy foods. This includes fruits, vegetables, lean meats and dairy, and whole grains.  Include your family in your fitness plan. Most peoplethink of activities such as jogging or tennis as the way to fitness, but there are many ways you and your family can be more active.  Anything that makes you breathe hard and gets your heart pumping is exercise. Here are sometips:   Walk to do errands or to take your child to school or the bus.  Go for a family bike ride after dinner instead of watching TV.  Where can you learn more?  Go totps://genesis.[employer]. org and sign in to your [employer] account. Enter Y006 in the Search HealthInformation box to learn more about \"A Healthy Lifestyle: Care Instructions. \"     If you do not have anaccount, please click on the \"Sign Up Now\" link.  Current as of: July 26, 2016   Content Version: 11.2   © 0686-5913 [employer]. Care instructions adapted under license by [facility] ([facility]). If you have questions about a medical condition or this instruction, always ask your healthcare professional. [employer] disclaims any warranty or liability for your use of this information.

## 2022-03-11 NOTE — PATIENT INSTRUCTIONS
* FALL   PRECAUTIONS. *  USE   WALKING  ASSISTANCE  DEVICES     /   WALKER        *   ADEQUATE   FLUID  INTAKE   AND  ELECTROLYTE  BALANCE             * KEEP  DAIRY  OF   THE  NEUROLOGICAL  SYMPTOMS          *  TO  MAINTAIN  REGULAR  SLEEP  WAKE  CYCLES. *   TO  HAVE  ADEQUATE  REST  AND   SLEEP    HOURS.          *    AVOID  USAGE OF   TOBACCO,  EXCESSIVE  ALCOHOL                AND   ILLEGAL   SUBSTANCES,  IF  ANY          *  Maintain   Healthy  Life Style    With   Periodic  Monitoring  Of         Any  Medical  Conditions  Including   Elevated  Blood  Pressure,  Lipid  Profile,       Blood  Sugar levels  And   Heart  Disease. *   Period   Screening  For  Cancers  Involving  Breast,  Colon,         Lungs  And  Other  Organs  As  Applicable,           In consultation   With  Your  Primary Care Providers. *  If   There is  Any  Significant  Worsening   Of  Current  Symptoms  And             Or  If    Any additional  New  Neurological  Symptoms  and          Significant  Concerns   Should  Call  911 or  Go  To  Emergency  Department            For  Further  Immediate  Evaluation.

## 2022-03-16 ENCOUNTER — PROCEDURE VISIT (OUTPATIENT)
Dept: CARDIOLOGY | Age: 77
End: 2022-03-16
Payer: MEDICARE

## 2022-03-16 DIAGNOSIS — Z95.0 CARDIAC PACEMAKER IN SITU: ICD-10-CM

## 2022-03-16 DIAGNOSIS — I44.2 THIRD DEGREE HEART BLOCK (HCC): ICD-10-CM

## 2022-03-16 DIAGNOSIS — I49.5 SICK SINUS SYNDROME (HCC): ICD-10-CM

## 2022-03-16 PROCEDURE — 93288 INTERROG EVL PM/LDLS PM IP: CPT | Performed by: INTERNAL MEDICINE

## 2022-03-18 DIAGNOSIS — R32 URINARY INCONTINENCE, UNSPECIFIED TYPE: ICD-10-CM

## 2022-03-19 NOTE — TELEPHONE ENCOUNTER
Tahir Garza is requesting a refill on the following medication(s):  Requested Prescriptions     Pending Prescriptions Disp Refills    mirabegron (MYRBETRIQ) 25 MG TB24 [Pharmacy Med Name: [facility] 25 MG TABLET] 30 tablet 1     Sig: take 1 tablet by mouth once daily       Last Visit Date (If Applicable):  5/64/7579    Next Visit Date:    8/11/2022

## 2022-03-22 ENCOUNTER — OFFICE VISIT (OUTPATIENT)
Dept: CARDIOLOGY | Age: 77
End: 2022-03-22
Payer: MEDICARE

## 2022-03-22 VITALS
DIASTOLIC BLOOD PRESSURE: 66 MMHG | HEIGHT: 69 IN | BODY MASS INDEX: 30.81 KG/M2 | SYSTOLIC BLOOD PRESSURE: 129 MMHG | HEART RATE: 65 BPM | WEIGHT: 208 LBS

## 2022-03-22 DIAGNOSIS — I49.5 SSS (SICK SINUS SYNDROME) (HCC): ICD-10-CM

## 2022-03-22 DIAGNOSIS — G31.84 MILD COGNITIVE IMPAIRMENT WITH MEMORY LOSS: ICD-10-CM

## 2022-03-22 DIAGNOSIS — I44.2 THIRD DEGREE HEART BLOCK (HCC): Primary | ICD-10-CM

## 2022-03-22 DIAGNOSIS — I10 HYPERTENSION, ESSENTIAL: ICD-10-CM

## 2022-03-22 DIAGNOSIS — E66.09 CLASS 1 OBESITY DUE TO EXCESS CALORIES WITH SERIOUS COMORBIDITY IN ADULT, UNSPECIFIED BMI: ICD-10-CM

## 2022-03-22 DIAGNOSIS — I48.0 PAROXYSMAL A-FIB (HCC): ICD-10-CM

## 2022-03-22 DIAGNOSIS — E78.2 MIXED HYPERLIPIDEMIA: ICD-10-CM

## 2022-03-22 DIAGNOSIS — Z95.0 S/P PLACEMENT OF CARDIAC PACEMAKER: ICD-10-CM

## 2022-03-22 DIAGNOSIS — Z87.891 FORMER TOBACCO USE: ICD-10-CM

## 2022-03-22 PROCEDURE — G8427 DOCREV CUR MEDS BY ELIG CLIN: HCPCS | Performed by: INTERNAL MEDICINE

## 2022-03-22 PROCEDURE — 99213 OFFICE O/P EST LOW 20 MIN: CPT | Performed by: INTERNAL MEDICINE

## 2022-03-22 PROCEDURE — 4040F PNEUMOC VAC/ADMIN/RCVD: CPT | Performed by: INTERNAL MEDICINE

## 2022-03-22 PROCEDURE — 93010 ELECTROCARDIOGRAM REPORT: CPT | Performed by: INTERNAL MEDICINE

## 2022-03-22 PROCEDURE — G8484 FLU IMMUNIZE NO ADMIN: HCPCS | Performed by: INTERNAL MEDICINE

## 2022-03-22 PROCEDURE — 1123F ACP DISCUSS/DSCN MKR DOCD: CPT | Performed by: INTERNAL MEDICINE

## 2022-03-22 PROCEDURE — 99214 OFFICE O/P EST MOD 30 MIN: CPT | Performed by: INTERNAL MEDICINE

## 2022-03-22 PROCEDURE — 93005 ELECTROCARDIOGRAM TRACING: CPT | Performed by: INTERNAL MEDICINE

## 2022-03-22 PROCEDURE — G8417 CALC BMI ABV UP PARAM F/U: HCPCS | Performed by: INTERNAL MEDICINE

## 2022-03-22 PROCEDURE — 1036F TOBACCO NON-USER: CPT | Performed by: INTERNAL MEDICINE

## 2022-03-22 RX ORDER — DONEPEZIL HYDROCHLORIDE 5 MG/1
5 TABLET, FILM COATED ORAL NIGHTLY
Qty: 90 TABLET | Refills: 1 | Status: SHIPPED | OUTPATIENT
Start: 2022-03-22 | End: 2023-03-17

## 2022-03-22 NOTE — TELEPHONE ENCOUNTER
Requested Prescriptions     Pending Prescriptions Disp Refills    donepezil (ARICEPT) 5 MG tablet 90 tablet 1     Sig: Take 1 tablet by mouth nightly

## 2022-03-22 NOTE — PROGRESS NOTES
Cardiology Consultation  [facility] [place], [place], Noel Bergeron)    22    Patient is here for follow up Afib and abn ECG, 3rd degree avb, SSS. Used to see [facility] cardio. HPI and Chief Complaint:  Mohsen Phan  is doing well from a cardiac standpoint. Good functional capacity with no significant change in functional capacity. No chest pain, no dyspnea, no PND, no syncope or pre-syncope, no orthopnea. No symptoms of CHF or angina/chest pain. Patient referred from pcp for afib, SSS, and possible heart block. Currently has A-paced rhythm. Was feeling fatigued, tired, sleepy. Feels 200% better since stopping BP meds. Past Medical History:   Diagnosis Date    Diverticulosis     Hyperlipidemia     Low HDL    Hyperuricemia     Obesity     Osteoarthrosis     not designated as generalized or localized    Overactive bladder     [facility]     Third degree heart block ([facility] 2017       Past Surgical History:   Procedure Laterality Date    A-V CARDIAC PACEMAKER INSERTION      Dr Kathy Perez;  [facility]    COLONOSCOPY  2007    diverticulosis    CYSTOSCOPY  2019    with Botox    FIBULA FRACTURE SURGERY Right 2000    [facility] [place] [facility]    HIP ARTHROPLASTY Bilateral ,    Dr Gross  2017    PROSTATE BIOPSY  07/10/2008    PROSTATECTOMY      w/lymph node dissection Dr Jennifer Tovar Right 10/2013    Dr Cal Donis,     Flexible       Family History   Problem Relation Age of Onset    Alzheimer's Disease Mother     Emphysema Father     Other Father         vascular disease- of aneurysm       Social History     Socioeconomic History    Marital status:      Spouse name: None    Number of children: None    Years of education: None    Highest education level: None   Occupational History    None   Tobacco Use    Smoking status: Former Smoker     Packs/day: 2.00     Years: 15.00     Pack years: 30.00     Quit date: 10/18/1981     Years since quittin.4    Smokeless tobacco: Never Used   Substance and Sexual Activity    Alcohol use: No    Drug use: No    Sexual activity: None   Other Topics Concern    None   Social History Narrative    None     Social Determinants of Health     Financial Resource Strain: Low Risk     Difficulty of Paying Living Expenses: Not hard at all   Food Insecurity: No Food Insecurity    Worried About Running Out of Food in the Last Year: Never true    Caitlyn of Food in the Last Year: Never true   Transportation Needs:     Lack of Transportation (Medical): Not on file    Lack of Transportation (Non-Medical):  Not on file   Physical Activity:     Days of Exercise per Week: Not on file    Minutes of Exercise per Session: Not on file   Stress:     Feeling of Stress : Not on file   Social Connections:     Frequency of Communication with Friends and Family: Not on file    Frequency of Social Gatherings with Friends and Family: Not on file    Attends Orthodoxy Services: Not on file    Active Member of [address] [place] [address] or Organizations: Not on file    Attends Club or Organization Meetings: Not on file    Marital Status: Not on file   Intimate Partner Violence:     Fear of Current or Ex-Partner: Not on file    Emotionally Abused: Not on file    Physically Abused: Not on file    Sexually Abused: Not on file   Housing Stability:     Unable to Pay for Housing in the Last Year: Not on file    Number of [place] in the Last Year: Not on file    Unstable Housing in the Last Year: Not on file        Allergies   Allergen Reactions    Pravastatin        Current Outpatient Medications   Medication Sig Dispense Refill    mirabegron (MYRBETRIQ) 25 MG TB24 take 1 tablet by mouth once daily 90 tablet 0    citalopram (CELEXA) 20 MG tablet take 1 tablet by mouth once daily 30 tablet 5    memantine (NAMENDA) 10 MG tablet Take 1 tablet by mouth 2 times daily 60 tablet 5    donepezil (ARICEPT) 5 MG tablet take 1 tablet by mouth nightly 90 tablet 1    oxybutynin (DITROPAN-XL) 5 MG extended release tablet take 1 tablet by mouth once daily      Glucosamine-Chondroit-Vit C-Mn (GLUCOSAMINE 1500 COMPLEX PO) Take by mouth      CPAP Machine MISC by Does not apply route Pressure of 13      vitamin B-12 (CYANOCOBALAMIN) 1000 MCG tablet Take 1,000 mcg by mouth daily      ELIQUIS 5 MG TABS tablet Take 5 mg by mouth 2 times daily   0    Coenzyme Q10 10 MG CAPS Take 1 capsule by mouth daily       No current facility-administered medications for this visit. Patient Active Problem List   Diagnosis    Hyperlipidemia    Essential hypertension    Malignant neoplasm of prostate ([address]    Diverticulosis of intestine without perforation or abscess without bleeding    Osteoarthritis of knee    Third degree heart block ([address]    Sick sinus syndrome (HCC)    Paroxysmal atrial fibrillation (HCC)    SHONDA treated with BiPAP    Depression, major, recurrent, mild (HCC)    Dementia without behavioral disturbance ([address]    BMI 32.0-32.9,adult    Gait difficulty    Balance problem    Chronic cerebral ischemia    Risk for falls    Confusion    Dizziness    Acquired cerebral atrophy ([address]    Nontraumatic subdural hygroma           REVIEW OF SYSTEMS:    · Constitutional: there has been no unanticipated weight loss. There's been No change in energy level, No change in activity level. · Eyes: No visual changes or diplopia. No scleral icterus. · ENT: No Headaches, hearing loss or vertigo. No mouth sores or sore throat. · Cardiovascular: No chest pain, no dyspnea, no chf like symptoms  · Respiratory: No previous pulmonary problems  · Gastrointestinal: No abdominal pain, appetite loss, blood in stools. No change in bowel or bladder habits. · Genitourinary: No dysuria, trouble voiding, or hematuria.   · Musculoskeletal:  No gait disturbance, No weakness or joint complaints. · Integumentary: No rash or pruritis. · Neurological: No headache, diplopia, change in muscle strength, numbness or tingling. No change in gait, balance, coordination, mood, affect, memory, mentation, behavior. · Psychiatric: No new anxiety or depression. · Endocrine: No temperature intolerance. No excessive thirst, fluid intake, or urination. No tremor. · Hematologic/Lymphatic: No abnormal bruising or bleeding, blood clots or swollen lymph nodes. · Allergic/Immunologic: No nasal congestion or hives. Physical Exam:   Vitals: /66   Pulse 65   Ht 5' 8.5\" (1.74 m)   Wt 208 lb (94.3 kg)   BMI 31.17 kg/m²   General appearance: alert and cooperative with exam  HEENT: Head: Normocephalic, no lesions, without obvious abnormality. Eyes: PERRL, EOMI  Ears: Not obvious deformations or lack of hearing  Neck: no carotid bruit, no JVD  Lungs: clear to auscultation bilaterally  Heart: regular rate and rhythm, S1, S2 normal, no murmur, click, rub or gallop  Abdomen: soft, non-tender; bowel sounds normal; no masses,  no organomegaly  Extremities: extremities normal, atraumatic, no cyanosis or edema  Neurologic: Mental status: Alert, oriented, thought content appropriate  Skin: WNL for age and condition, no obvious rashes or leasions      EKG: A-paced Rhythm with no ischemic changes. Reviewed today's ECG along serial ECGs    LAST ECHO:    LAST STRESS:    LAST CATH:      Past Medical and Surgical History, Problem List, Allergies, Medications, Labs, Imaging, all reviewed extensively in EMR and with the patient. Assessment and Plan:    1. Fatigued and tired with low BP. We had stopped lisinopril 10/12.5, he was on half a pill. Feels 200% better since stopping BP meds. 2. Dementia- is on aricept- may not need this if BP goes higher. 3. Afib- has been on Eliquis, currently in A paced rhythm, some afib on pacer check  4. SSS- pacer in place [employer]  5.  Reviewed pacer check and programming and compared to prior  6. Hx of 3rd degree heart block- pacer in place. Follow with pacer clinic. 7. HTN- has been low, see above. 8. Obesity - Chronic. Encouraged diet, exercise, and discussed weight loss extensively. 5. Former Tobacco abuse. Chronic. Discussed extensively and gave options to help with quitting. 10. Hyperlipidemia- Chronic. As above. LDL goal < 70. Optimize therapy. 11. Reviewed [facility] records. Has EF 55% on last echo with them. Thank you for allowing me to participate in the care of this patient, please do not hesitate to call if you have any questions. Grady Spence, [id number] [facility]  [facility]. [employer]  [phone]

## 2022-03-25 DIAGNOSIS — G30.1 LATE ONSET ALZHEIMER'S DEMENTIA WITHOUT BEHAVIORAL DISTURBANCE (HCC): ICD-10-CM

## 2022-03-25 DIAGNOSIS — F02.80 LATE ONSET ALZHEIMER'S DEMENTIA WITHOUT BEHAVIORAL DISTURBANCE (HCC): ICD-10-CM

## 2022-03-25 RX ORDER — MEMANTINE HYDROCHLORIDE 10 MG/1
10 TABLET ORAL 2 TIMES DAILY
Qty: 60 TABLET | Refills: 5 | Status: SHIPPED | OUTPATIENT
Start: 2022-03-25

## 2022-03-25 NOTE — TELEPHONE ENCOUNTER
Requested Prescriptions     Pending Prescriptions Disp Refills    memantine (NAMENDA) 10 MG tablet 60 tablet 5     Sig: Take 1 tablet by mouth 2 times daily

## 2022-04-19 ENCOUNTER — OFFICE VISIT (OUTPATIENT)
Dept: PULMONOLOGY | Age: 77
End: 2022-04-19
Payer: MEDICARE

## 2022-04-19 VITALS
BODY MASS INDEX: 30.36 KG/M2 | TEMPERATURE: 97.2 F | HEART RATE: 49 BPM | OXYGEN SATURATION: 98 % | HEIGHT: 69 IN | WEIGHT: 205 LBS | DIASTOLIC BLOOD PRESSURE: 72 MMHG | SYSTOLIC BLOOD PRESSURE: 128 MMHG

## 2022-04-19 DIAGNOSIS — G47.33 SEVERE OBSTRUCTIVE SLEEP APNEA: ICD-10-CM

## 2022-04-19 DIAGNOSIS — Z99.89 OSA ON CPAP: Primary | ICD-10-CM

## 2022-04-19 DIAGNOSIS — G47.33 OSA TREATED WITH BIPAP: Primary | ICD-10-CM

## 2022-04-19 DIAGNOSIS — I10 ESSENTIAL HYPERTENSION: ICD-10-CM

## 2022-04-19 DIAGNOSIS — G47.33 OSA ON CPAP: Primary | ICD-10-CM

## 2022-04-19 PROCEDURE — 1123F ACP DISCUSS/DSCN MKR DOCD: CPT | Performed by: INTERNAL MEDICINE

## 2022-04-19 PROCEDURE — G8417 CALC BMI ABV UP PARAM F/U: HCPCS | Performed by: INTERNAL MEDICINE

## 2022-04-19 PROCEDURE — 4040F PNEUMOC VAC/ADMIN/RCVD: CPT | Performed by: INTERNAL MEDICINE

## 2022-04-19 PROCEDURE — 99214 OFFICE O/P EST MOD 30 MIN: CPT | Performed by: INTERNAL MEDICINE

## 2022-04-19 PROCEDURE — 1036F TOBACCO NON-USER: CPT | Performed by: INTERNAL MEDICINE

## 2022-04-19 PROCEDURE — G8427 DOCREV CUR MEDS BY ELIG CLIN: HCPCS | Performed by: INTERNAL MEDICINE

## 2022-04-19 NOTE — PROGRESS NOTES
REASON FOR FOLLOW-UP  SHONDA  HISTORY OF PRESENT ILLNESS:           Grace Onofre is a 68 y.o. old male who comes in for follow up regarding his obstructive sleep apnea. Last visit his CPAP was changed to auto titrating CPAP, average pressure is 15 cm water, which is higher than his original CPAP which is 13 cm of water pressure. But his AHI has worsened. Patient is not wearing hearing aids today but according to his wife he is sleeping soundly and continues to have fatigue during the day. Last visit   Patient had his sleep study. His initial sleep study was done 11/26/2018 which showed a sleep efficiency of 57%. Patient spent 7% time, and REM sleep, 82% of the time was   Stage I.  . His AHI 68 events per hour. Titration sleep study done 11/28/2018 patient's sleep efficiency improved to 76%. He had improvement in rem sleep to 22% and stage I decreased to 47% at the CPAP off 14-13 cm of water pressure. Patient had no respiratory events and lowest saturation was 97%. Patient was prescribed a CPAP of 13 cm of water pressure, but they have not rate picked up CPAP machine from [facility] provider because they wanted to review it with me first.  Sleep Medicine 10/30/2019   Sitting and reading 0   Watching TV 1   Sitting, inactive in a public place (e.g. a theatre or a meeting) 0   As a passenger in a car for an hour without a break 0   Lying down to rest in the afternoon when circumstances permit 2   Sitting and talking to someone 0   Sitting quietly after a lunch without alcohol 1   In a car, while stopped for a few minutes in traffic 0   Total score 4      Last visit     here for evaluation of snoring and hypersomnia.   He is accompanied by his wife who has noted that patient at night is snoring, snorting, choking, having periods of not breathing, tossing and turning, decreased memory, decreased concentration,  falling asleep while reading, watching television, no increase in weight  , no sinus problems, no congested nose ,  denies feeling completely or partially paralyzed while falling asleep or waking up. LUNG CANCER SCREENING     1. CRITERIA MET    []     CT ORDERED  []      2. CRITERIA NOT MET   [x]      3. REFUSED                    []        REASON CRITERIA NOT MET     1. SMOKING LESS THAN 30 PY  []      2. AGE LESS THAN 55 or GREATER 77 YEARS  [x]      3. QUIT SMOKING 15 YEARS OR GREATER   []      4. RECENT CT WITH IN 11 MONTHS    []      5. LIFE EXPECTANCY < 5 YEARS   []      6.  SIGNS  AND SYMPTOMS OF LUNG CANCER   []         Immunization   Immunization History   Administered Date(s) Administered    COVID-19, Pfizer Purple top, DILUTE for use, 12+ yrs, 30mcg/0.3mL dose 02/04/2021, 02/25/2021, 10/28/2021    Hepatitis A Adult (Havrix, Vaqta) 07/12/2018, 01/16/2019    Influenza A ([id number]) Vaccine PF IM 11/24/2009    Influenza Virus Vaccine 01/12/2014, 04/04/2017    Influenza Whole 09/08/2009, 10/18/2010, 09/29/2011    Influenza, High Dose (Fluzone 65 yrs and older) 10/07/2015, 09/21/2016, 09/26/2017, 09/24/2018, 10/26/2020    Influenza, High-dose, Harlashay Kilts, 65 yrs +, IM (Fluzone) 10/13/2021    Influenza, Quadv, adjuvanted, 65 yrs +, IM, PF (Fluad) 11/20/2020    Influenza, Triv, inactivated, subunit, adjuvanted, IM (Fluad 65 yrs and older) 10/25/2019    Pneumococcal Conjugate 13-valent ([id number]) 07/14/2015    Pneumococcal Polysaccharide ([id number]) 01/16/2017, 04/04/2017    Td vaccine (adult) 08/20/2008    Td, unspecified formulation 08/20/2008    Tdap (Boostrix, Adacel) 10/13/2021    Zoster Live (Zostavax) 07/22/2009, 11/23/2009    Zoster Recombinant (Shingrix) 09/01/2019, 10/01/2020        Pneumococcal Vaccine     [x] Up to date    [] Indicated   [] Refused  [] Contraindicated       Influenza Vaccine   [] Up to date    [x] Indicated   [] Refused  [] Contraindicated          PAST MEDICAL HISTORY:       Diagnosis Date    Diverticulosis     Hyperlipidemia     Low HDL    Hyperuricemia  Obesity     Osteoarthrosis     not designated as generalized or localized    Overactive bladder     Pro[facility] [address]     Third degree heart block ([facility] [address] 2017         Family History:       Problem Relation Age of Onset    Alzheimer's Disease Mother     Emphysema Father     Other Father         vascular disease- of aneurysm       SURGICAL HISTORY:   Past Surgical History:   Procedure Laterality Date   [facility]enhall; [facility]    COLONOSCOPY  2007    diverticulosis    CYSTOSCOPY  2019    with Botox    FIBULA FRACTURE SURGERY Right 2000    [facility] [facility]    HIP ARTHROPLASTY Bilateral ,    Dr Henri Cedillo  2017    PROSTATE BIOPSY  07/10/2008    PROSTATECTOMY  2008    w/lymph node dissection Dr Ambreen Abreu Right 10/2013    Dr Michael Horner,     [address]:      There  no history of TB or TB exposure. There  no asbestos or silica dust exposure. The patient reports  no coal, foundry, quarry or [employer] exposure. Travel history reveals no. There  no history of recreational or IV drug use. There  no hot tub exposure. Pets  no    Occupational history desk job , now retired now . TOBACCO:   reports that he quit smoking about 40 years ago. He has a 30.00 pack-year smoking history. He has never used smokeless tobacco.  ETOH:   reports no history of alcohol use. ALLERGIES:      Allergies   Allergen Reactions    Pravastatin          Home Meds:   Prior to Admission medications    Medication Sig Start Date End Date Taking?  Authorizing Provider   memantine (NAMENDA) 10 MG tablet Take 1 tablet by mouth 2 times daily 3/25/22   Isaac Velazquez,    donepezil (ARICEPT) 5 MG tablet Take 1 tablet by mouth nightly 3/22/22 3/17/23  Velvet Saravia,    mirabegron (MYRBETRIQ) 25 MG TB24 take 1 tablet by mouth once daily 3/18/22 Juancho Young DO   citalopram (CELEXA) 20 MG tablet take 1 tablet by mouth once daily 11/8/21   Dhara Crowell MD   oxybutynin (DITROPAN-XL) 5 MG extended release tablet take 1 tablet by mouth once daily 11/13/20   Historical Provider, MD   Glucosamine-Chondroit-Vit C-Mn (GLUCOSAMINE 1500 COMPLEX PO) Take by mouth    Historical Provider, MD   CPAP Machine MISC by Does not apply route Pressure of 13    Historical Provider, MD   vitamin B-12 (CYANOCOBALAMIN) 1000 MCG tablet Take 1,000 mcg by mouth daily    Historical Provider, MD   ELIQUIS 5 MG TABS tablet Take 5 mg by mouth 2 times daily  12/22/17   Historical Provider, MD   Coenzyme Q10 10 MG CAPS Take 1 capsule by mouth daily    Historical Provider, MD            Review of Systems -  General ROS: negative for - chills, fever or weight loss  ENT ROS: negative for - headaches, oral lesions or sore throat  Cardiovascular ROS: no chest pain , orthopnea or pnd   Gastrointestinal ROS: no abdominal pain, change in bowel habits, or black or bloody stools  Skin - no rash   Neuro - no blurry vision , no loc . No focal weakness         Vitals:  /72   Pulse (!) 49   Temp 97.2 °F (36.2 °C)   Ht 5' 8.5\" (1.74 m)   Wt 205 lb (93 kg)   SpO2 98%   BMI 30.72 kg/m²     PHYSICAL EXAM:  Head and neck atraumatic, normocephalic    Lymph nodes-no cervical, supraclavicular lymphadenopathy    Neck-no JVP elevation    Lungs - Ventilating all lobes without any rales, rhonchi, wheezes or dullness. No bronchial breath sounds. Chest expansion equal bilaterally    CVS- S1, S2 regular. No S3 no S4, no murmurs    Abdomen-nontender, nondistended. Bowel sounds are present. No organomegaly    Lower extremity-no edema    Upper extremity-no edema    Neurological-grossly normal cranial nerves. No overt motor deficit                         IMPRESSION:     Diagnosis Orders   1. SHONDA on CPAP  Sleep Study with PAP Titration    Baseline Diagnostic Sleep Study   2.  Severe obstructive sleep apnea  Sleep Study with PAP Titration    Baseline Diagnostic Sleep Study   3. Essential hypertension          :                PLAN:        Compliance report reviewed on auto adjusting CPAP machine 8-16 cm of water pressure. AHI has worsened. Patient has severe sleep apnea and last sleep study evaluation was done in 2018. Will proceed with repeat diagnostic and titration sleep study and if possible split-night study. Follow-up in 3 to 4 months      Requested Prescriptions      No prescriptions requested or ordered in this encounter       There are no discontinued medications. Brodie Arrieta received counseling on the following healthy behaviors: nutrition, exercise and medication adherence    Patient given educational materials : see patient instruction       Discussed use, benefit, and side effects of prescribed medications. Barriers to medication compliance addressed. All patient questions answered. Pt voiced understanding. I hope this updates you on my evaluation and clinical thinking. Thank you for allowing me to participate in his care. Sincerely,      Electronically signed by Ivis Pearl MD on 4/19/2022 at 2:25 PM       Please note that this chart was generated using voice recognition Dragon dictation software. Although every effort was made to ensure the accuracy of this automated transcription, some errors in transcription may have occurred.

## 2022-04-21 DIAGNOSIS — R32 URINARY INCONTINENCE, UNSPECIFIED TYPE: ICD-10-CM

## 2022-04-22 NOTE — TELEPHONE ENCOUNTER
Bud Zabala is requesting a refill on the following medication(s):  Requested Prescriptions     Pending Prescriptions Disp Refills    mirabegron (MYRBETRIQ) 25 MG TB24 [Pharmacy Med Name: Jammie Popper ER 25 MG TABLET] 90 tablet 0     Sig: take 1 tablet by mouth once daily       Last Visit Date (If Applicable):  1/23/9704    Next Visit Date:    8/11/2022

## 2022-05-10 ENCOUNTER — HOSPITAL ENCOUNTER (OUTPATIENT)
Dept: SLEEP CENTER | Age: 77
Discharge: HOME | End: 2022-05-12
Payer: MEDICARE

## 2022-05-10 DIAGNOSIS — Z99.89 OSA ON CPAP: ICD-10-CM

## 2022-05-10 DIAGNOSIS — G47.33 OSA ON CPAP: ICD-10-CM

## 2022-05-10 DIAGNOSIS — G47.33 SEVERE OBSTRUCTIVE SLEEP APNEA: ICD-10-CM

## 2022-05-10 PROCEDURE — 95810 POLYSOM 6/> YRS 4/> PARAM: CPT

## 2022-05-17 LAB — STATUS: NORMAL

## 2022-06-01 ENCOUNTER — OUTSIDE SERVICES (OUTPATIENT)
Dept: INTERNAL MEDICINE | Age: 77
End: 2022-06-01
Payer: MEDICARE

## 2022-06-01 DIAGNOSIS — G47.33 OSA TREATED WITH BIPAP: ICD-10-CM

## 2022-06-01 DIAGNOSIS — E78.2 MIXED HYPERLIPIDEMIA: ICD-10-CM

## 2022-06-01 DIAGNOSIS — I10 ESSENTIAL HYPERTENSION: Primary | ICD-10-CM

## 2022-06-01 DIAGNOSIS — M17.9 OSTEOARTHRITIS OF KNEE, UNSPECIFIED LATERALITY, UNSPECIFIED OSTEOARTHRITIS TYPE: ICD-10-CM

## 2022-06-01 DIAGNOSIS — I48.0 PAROXYSMAL ATRIAL FIBRILLATION (HCC): ICD-10-CM

## 2022-06-01 DIAGNOSIS — F03.90 DEMENTIA WITHOUT BEHAVIORAL DISTURBANCE, UNSPECIFIED DEMENTIA TYPE: ICD-10-CM

## 2022-06-01 PROCEDURE — 99305 1ST NF CARE MODERATE MDM 35: CPT | Performed by: INTERNAL MEDICINE

## 2022-06-02 DIAGNOSIS — F33.0 DEPRESSION, MAJOR, RECURRENT, MILD (HCC): ICD-10-CM

## 2022-06-02 RX ORDER — CITALOPRAM 20 MG/1
TABLET ORAL
Qty: 30 TABLET | Refills: 5 | Status: SHIPPED | OUTPATIENT
Start: 2022-06-02

## 2022-06-02 NOTE — TELEPHONE ENCOUNTER
Requested Prescriptions     Pending Prescriptions Disp Refills    citalopram (CELEXA) 20 mg tablet 30 tablet 5     Sig: take 1 tablet by mouth once daily

## 2022-06-04 NOTE — PROGRESS NOTES
Date of Admission: 6/1/2022  Date of Encounter: 6/1/2022  Patient:  Rony Neff  YOB: 1945      Chief Complaint: Admission to [facility] after treatment the hospital for a fall    History of Present Illness:  He was treated in the hospital after a fall and was found to have rhabdomyolysis, non-STEMI. He has a history of frequent falls, and balance issues. Has a history of Alzheimer's disease as well. Has a history of osteoarthritis. He says that he is doing okay right now. Denies any pain in his chest, denies shortness of breath denies fever or chills. Says that he does not feel like he is hurt at this time. But it seems that he has a laceration to his left thumb. Past Medical History: Hypertension, A. fib, dementia, SHONDA  Past Family History: Noncontributory  Social History: Non-smoker, nondrinker at this time    Review of Systems:  Constitutional: Denies fever, chills  Cardiac: Denies chest pain, palpitations  Except as dictated above, all other systems reviewed and are negative     Physical Exam  Vitals: Blood pressure 127/69. Pulse 66. Respirations 20. Temperature 98.4  Constitutional: No acute distress  Eyes: No lid lag or proptosis. PERRLA  HENT: External ears normal. No lesions on the lips  Respiratory: Good air entry bilaterally. No wheezing or crackles   Cardiovascular: Normal S1-S2. No murmurs   Gastrointestinal: No visible veins or masses. Good bowel sounds  Skin: No abnormal rashes. No digital cyanosis  Neurologic: Cranial nerves grossly intact. Sensation grossly intact  Psychiatric: Alert. Normal Affect. Assessments   Diagnosis Orders   1. Essential hypertension     2. Paroxysmal atrial fibrillation (HCC)     3. Dementia without behavioral disturbance, unspecified dementia type ([facility] [address]     4. SHONDA treated with BiPAP     5. Osteoarthritis of knee, unspecified laterality, unspecified osteoarthritis type     6.  Mixed hyperlipidemia         Plan  Will consult wound care for laceration. No evidence of infection at this time  We will continue current medications unless otherwise noted. I will be notified of any changes to the patient's condition. We will continue physical therapy and Occupational Therapy for strengthening as well. This note has been created using the [employer] Â® health record, and dictated in part by [email or web address] dictation system. Despite the documenting physician's best efforts, there may be errors in spelling, grammar or syntax.

## 2022-07-06 ENCOUNTER — OUTSIDE SERVICES (OUTPATIENT)
Dept: INTERNAL MEDICINE | Age: 77
End: 2022-07-06
Payer: MEDICARE

## 2022-07-06 DIAGNOSIS — M17.9 OSTEOARTHRITIS OF KNEE, UNSPECIFIED LATERALITY, UNSPECIFIED OSTEOARTHRITIS TYPE: ICD-10-CM

## 2022-07-06 DIAGNOSIS — E78.2 MIXED HYPERLIPIDEMIA: ICD-10-CM

## 2022-07-06 DIAGNOSIS — I10 ESSENTIAL HYPERTENSION: Primary | ICD-10-CM

## 2022-07-06 DIAGNOSIS — I48.0 PAROXYSMAL ATRIAL FIBRILLATION (HCC): ICD-10-CM

## 2022-07-06 DIAGNOSIS — F03.90 DEMENTIA WITHOUT BEHAVIORAL DISTURBANCE, UNSPECIFIED DEMENTIA TYPE: ICD-10-CM

## 2022-07-06 PROCEDURE — 99309 SBSQ NF CARE MODERATE MDM 30: CPT | Performed by: INTERNAL MEDICINE

## 2022-07-08 NOTE — PROGRESS NOTES
Date of Encounter: 7/6/2022  Patient:  Vega Aguilar  YOB: 1945      Chief Complaint: Follow-up after a fall    History of Present Illness:  He was admitted over a month ago for a fall and a non-STEMI, has been undergoing physical therapy and Occupational Therapy. Seems to be doing okay otherwise. No issues reported at this time. Past Medical/Surgical/Social History: Per admission note    Physical Exam  Vitals: Blood pressure 94/62. Pulse 65. Respirations 16. Temperature 97.3  Constitutional: No acute distress  Eyes: Sclerae nonicteric. No lid lag or proptosis  HENT: External ears normal. No external lesions on the nose. No lesions on the lips  Neck: No gross masses. Trachea visibly midline  Respiratory: Normal S1-S2. No murmurs  Cardiovascular: Good air entry bilaterally. No wheezing or crackles  Gastrointestinal: No visible masses. Skin: No abnormal rashes. No digital cyanosis  Neurologic: Cranial nerves grossly intact    Assessments   Diagnosis Orders   1. Essential hypertension     2. Paroxysmal atrial fibrillation (HCC)     3. Dementia without behavioral disturbance, unspecified dementia type ([facility] [address]     4. Osteoarthritis of knee, unspecified laterality, unspecified osteoarthritis type     5. Mixed hyperlipidemia         Plan  We will continue current medications unless otherwise noted. I will be notified of any changes to the patient's condition. This note has been created using the [email or web address] health record, and dictated in part by [email or web address] dictation system. Despite the documenting physician's best efforts, there may be errors in spelling, grammar or syntax.

## 2022-08-22 ENCOUNTER — TELEPHONE (OUTPATIENT)
Dept: FAMILY MEDICINE CLINIC | Age: 77
End: 2022-08-22

## 2022-08-22 NOTE — TELEPHONE ENCOUNTER
Patient's wife is wondering if she should allow  to get 2nd Covid booster being offered at [facility]. She is reqesting your opinion. She is unsure if shot they are offering is the newest version or not.

## 2022-09-28 ENCOUNTER — OUTSIDE SERVICES (OUTPATIENT)
Dept: INTERNAL MEDICINE | Age: 77
End: 2022-09-28
Payer: MEDICARE

## 2022-09-28 DIAGNOSIS — F03.90 DEMENTIA WITHOUT BEHAVIORAL DISTURBANCE (HCC): ICD-10-CM

## 2022-09-28 DIAGNOSIS — I10 ESSENTIAL HYPERTENSION: Primary | ICD-10-CM

## 2022-09-28 DIAGNOSIS — E78.2 MIXED HYPERLIPIDEMIA: ICD-10-CM

## 2022-09-28 DIAGNOSIS — C61 MALIGNANT NEOPLASM OF PROSTATE (HCC): ICD-10-CM

## 2022-09-28 DIAGNOSIS — M17.9 OSTEOARTHRITIS OF KNEE, UNSPECIFIED LATERALITY, UNSPECIFIED OSTEOARTHRITIS TYPE: ICD-10-CM

## 2022-09-28 DIAGNOSIS — I48.0 PAROXYSMAL ATRIAL FIBRILLATION (HCC): ICD-10-CM

## 2022-09-28 DIAGNOSIS — G47.33 OSA TREATED WITH BIPAP: ICD-10-CM

## 2022-09-28 PROCEDURE — 99309 SBSQ NF CARE MODERATE MDM 30: CPT | Performed by: INTERNAL MEDICINE

## 2022-10-01 NOTE — PROGRESS NOTES
Date of Encounter: 9/28/2022  Patient:  Joselin Stephenson  YOB: 1945      Chief Complaint: Follow-up on chronic medical conditions    History of Present Illness:  He appears to be doing fine at this time. He says that he is doing okay. No reported complaints    Past Medical/Surgical/Social History: Per admission note    Physical Exam  Vitals: Blood pressure 132/72. Pulse 80. Respirations 18. Temperature 97.5  Constitutional: No acute distress  Eyes: Sclerae nonicteric. No lid lag or proptosis  HENT: External ears normal. No external lesions on the nose. No lesions on the lips  Neck: No gross masses. Trachea visibly midline  Respiratory: Good air entry bilaterally. No wheezing or crackles  Cardiovascular: Normal S1-S2. No murmurs  Gastrointestinal: No visible masses. Skin: No abnormal rashes. No digital cyanosis  Neurologic: Cranial nerves grossly intact    Assessments   Diagnosis Orders   1. Essential hypertension        2. Paroxysmal atrial fibrillation (HCC)        3. SHONDA treated with BiPAP        4. Dementia without behavioral disturbance ([facility] [place] [address]        5. Osteoarthritis of knee, unspecified laterality, unspecified osteoarthritis type        6. Malignant neoplasm of prostate ([facility] [place] [address]        7. Mixed hyperlipidemia            Plan  We will continue current medications unless otherwise noted. I will be notified of any changes to the patient's condition. This note has been created using the [employer] health record, and dictated in part by [employer] dictation system. Despite the documenting physician's best efforts, there may be errors in spelling, grammar or syntax.

## 2022-10-11 ENCOUNTER — OFFICE VISIT (OUTPATIENT)
Dept: CARDIOLOGY | Age: 77
End: 2022-10-11
Payer: MEDICARE

## 2022-10-11 VITALS — SYSTOLIC BLOOD PRESSURE: 112 MMHG | HEART RATE: 76 BPM | DIASTOLIC BLOOD PRESSURE: 64 MMHG

## 2022-10-11 DIAGNOSIS — I49.5 SSS (SICK SINUS SYNDROME) (HCC): ICD-10-CM

## 2022-10-11 DIAGNOSIS — I44.2 THIRD DEGREE HEART BLOCK (HCC): Primary | ICD-10-CM

## 2022-10-11 DIAGNOSIS — Z95.0 S/P PLACEMENT OF CARDIAC PACEMAKER: ICD-10-CM

## 2022-10-11 PROCEDURE — 99213 OFFICE O/P EST LOW 20 MIN: CPT | Performed by: INTERNAL MEDICINE

## 2022-10-11 PROCEDURE — G8417 CALC BMI ABV UP PARAM F/U: HCPCS | Performed by: INTERNAL MEDICINE

## 2022-10-11 PROCEDURE — G8427 DOCREV CUR MEDS BY ELIG CLIN: HCPCS | Performed by: INTERNAL MEDICINE

## 2022-10-11 PROCEDURE — 1036F TOBACCO NON-USER: CPT | Performed by: INTERNAL MEDICINE

## 2022-10-11 PROCEDURE — 93010 ELECTROCARDIOGRAM REPORT: CPT | Performed by: INTERNAL MEDICINE

## 2022-10-11 PROCEDURE — 93005 ELECTROCARDIOGRAM TRACING: CPT | Performed by: INTERNAL MEDICINE

## 2022-10-11 PROCEDURE — 99214 OFFICE O/P EST MOD 30 MIN: CPT | Performed by: INTERNAL MEDICINE

## 2022-10-11 PROCEDURE — 1123F ACP DISCUSS/DSCN MKR DOCD: CPT | Performed by: INTERNAL MEDICINE

## 2022-10-11 PROCEDURE — G8484 FLU IMMUNIZE NO ADMIN: HCPCS | Performed by: INTERNAL MEDICINE

## 2022-10-11 NOTE — PATIENT INSTRUCTIONS
Marisol senior check   Jan 12 th 2023 at 11am at [facility] on the [facility] will see Dr Berlin Kc after

## 2022-10-11 NOTE — PROGRESS NOTES
Cardiology Consultation  [facility]  [place], Radha Chance, Elliott López)    10/11/22    Patient is here for follow up Afib and abn ECG, 3rd degree avb, SSS. Used to see [facility] cardio. HPI and Chief Complaint:  Shalini Licea is doing well from a cardiac standpoint. No chest pain, no dyspnea, no PND, no syncope or pre-syncope, no orthopnea. No symptoms of CHF or angina/chest pain. In may 2022 he was admitted to [facility] with fall and required rehab  Now denies any recurrent falls or bleeding   On eliquis 5 mg bid     Past Medical History:   Diagnosis Date    Diverticulosis     Hyperlipidemia     Low HDL    Hyperuricemia     Obesity     Osteoarthrosis     not designated as generalized or localized    Overactive bladder     Prostate cancer ([address]     Third degree heart block ([address] 2017       Past Surgical History:   Procedure Laterality Date    A-V Hattie Garcia;  [facility]    COLONOSCOPY  2007    diverticulosis    CYSTOSCOPY  2019    with Botox    FIBULA FRACTURE SURGERY Right 2000    [facility] [facility]    HIP ARTHROPLASTY Bilateral ,    Dr Davin Block  2017    PROSTATE BIOPSY  07/10/2008    PROSTATECTOMY  2008    w/lymph node dissection Dr He Wallis Right 10/2013    Dr Pricilla Wallace,     Flexible       Family History   Problem Relation Age of Onset    Alzheimer's Disease Mother     Emphysema Father     Other Father         vascular disease- of aneurysm       Social History     Socioeconomic History    Marital status:      Spouse name: None    Number of children: None    Years of education: None    Highest education level: None   Tobacco Use    Smoking status: Former     Packs/day: 2.00     Years: 15.00     Pack years: 30.00     Types: Cigarettes     Quit date: 10/18/1981     Years since quittin.0    Smokeless tobacco: Never   Substance and Sexual Activity Alcohol use: No    Drug use: No     Social Determinants of Health     Financial Resource Strain: Low Risk     Difficulty of Paying Living Expenses: Not hard at all   Food Insecurity: No Food Insecurity    Worried About Running Out of Food in the Last Year: Never true    Ran Out of Food in the Last Year: Never true        Allergies   Allergen Reactions    Pravastatin        Current Outpatient Medications   Medication Sig Dispense Refill    citalopram (CELEXA) 20 mg tablet take 1 tablet by mouth once daily 30 tablet 5    mirabegron (MYRBETRIQ) 25 MG TB24 take 1 tablet by mouth once daily 90 tablet 0    memantine (NAMENDA) 10 MG tablet Take 1 tablet by mouth 2 times daily 60 tablet 5    donepezil (ARICEPT) 5 MG tablet Take 1 tablet by mouth nightly 90 tablet 1    oxybutynin (DITROPAN-XL) 5 MG extended release tablet take 1 tablet by mouth once daily      vitamin B-12 (CYANOCOBALAMIN) 1000 MCG tablet Take 1,000 mcg by mouth daily      ELIQUIS 5 MG TABS tablet Take 5 mg by mouth 2 times daily   0    Coenzyme Q10 10 MG CAPS Take 1 capsule by mouth daily      Glucosamine-Chondroit-Vit C-Mn (GLUCOSAMINE 1500 COMPLEX PO) Take by mouth      CPAP Machine MISC by Does not apply route Pressure of 13 (Patient not taking: Reported on 10/11/2022)       No current facility-administered medications for this visit.         Patient Active Problem List   Diagnosis    Hyperlipidemia    Essential hypertension    Malignant neoplasm of prostate ([facility] [address]    Diverticulosis of intestine without perforation or abscess without bleeding    Osteoarthritis of knee    Third degree heart block (HCC)    Sick sinus syndrome (HCC)    Paroxysmal atrial fibrillation (HCC)    SHONDA treated with BiPAP    Depression, major, recurrent, mild (HCC)    Dementia without behavioral disturbance (HCC)    BMI 32.0-32.9,adult    Gait difficulty    Balance problem    Chronic cerebral ischemia    Risk for falls    Confusion    Dizziness    Acquired cerebral atrophy ([address] Nontraumatic subdural hygroma           REVIEW OF SYSTEMS:    Constitutional: there has been no unanticipated weight loss. There's been No change in energy level, No change in activity level. Eyes: No visual changes or diplopia. No scleral icterus. ENT: No Headaches, hearing loss or vertigo. No mouth sores or sore throat. Cardiovascular: No chest pain, no dyspnea, no chf like symptoms  Respiratory: No previous pulmonary problems  Gastrointestinal: No abdominal pain, appetite loss, blood in stools. No change in bowel or bladder habits. Genitourinary: No dysuria, trouble voiding, or hematuria. Musculoskeletal:  No gait disturbance, No weakness or joint complaints. Integumentary: No rash or pruritis. Neurological: No headache, diplopia, change in muscle strength, numbness or tingling. No change in gait, balance, coordination, mood, affect, memory, mentation, behavior. Psychiatric: No new anxiety or depression. Endocrine: No temperature intolerance. No excessive thirst, fluid intake, or urination. No tremor. Hematologic/Lymphatic: No abnormal bruising or bleeding, blood clots or swollen lymph nodes. Allergic/Immunologic: No nasal congestion or hives. Physical Exam:   Vitals: /64 mm hg and HR 76   General appearance: alert and cooperative with exam  HEENT: Head: Normocephalic, no lesions, without obvious abnormality. Eyes: PERRL, EOMI  Ears: Not obvious deformations or lack of hearing  Neck: no carotid bruit, no JVD  Lungs: clear to auscultation bilaterally  Heart: regular rate and rhythm, S1, S2 normal, no murmur, click, rub or gallop  Abdomen: soft, non-tender; bowel sounds normal; no masses,  no organomegaly  Extremities: extremities normal, atraumatic, no cyanosis or edema  Neurologic: Mental status: Alert, oriented, thought content appropriate  Skin: WNL for age and condition, no obvious rashes or leasions      EKG 10/13/2022: underlying atrial fib, V apced rhythm (demand pacing). RBBB. Past Medical and Surgical History, Problem List, Allergies, Medications, Labs, Imaging, all reviewed extensively in EMR and with the patient. Assessment and Plan:    Afib, persistent, rate controlled, has been on Eliquis  SSS- pacer in place [email or web address], last interrogation in 03/2022, plan for repeat later this month. Dementia- is on aricept- may not need this if BP goes higher. Reviewed pacer check and programming and compared to prior  HTN- controlled. Obesity - Chronic. Encouraged diet, exercise, and discussed weight loss extensively. Former Tobacco abuse. Chronic. Discussed extensively and gave options to help with quitting. Hyperlipidemia- Chronic. As above. LDL goal < 70. Optimize therapy    Thank you for allowing me to participate in the care of this patient, please do not hesitate to call if you have any questions. Prince Brito MD  [facility]  [facility]  [phone]

## 2022-11-23 ENCOUNTER — OUTSIDE SERVICES (OUTPATIENT)
Dept: INTERNAL MEDICINE | Age: 77
End: 2022-11-23
Payer: MEDICARE

## 2022-11-23 DIAGNOSIS — G31.9 ACQUIRED CEREBRAL ATROPHY (HCC): ICD-10-CM

## 2022-11-23 DIAGNOSIS — G47.33 OSA TREATED WITH BIPAP: ICD-10-CM

## 2022-11-23 DIAGNOSIS — I44.2 THIRD DEGREE HEART BLOCK (HCC): ICD-10-CM

## 2022-11-23 DIAGNOSIS — I67.82 CHRONIC CEREBRAL ISCHEMIA: ICD-10-CM

## 2022-11-23 DIAGNOSIS — M17.9 OSTEOARTHRITIS OF KNEE, UNSPECIFIED LATERALITY, UNSPECIFIED OSTEOARTHRITIS TYPE: ICD-10-CM

## 2022-11-23 DIAGNOSIS — F03.90 DEMENTIA WITHOUT BEHAVIORAL DISTURBANCE (HCC): ICD-10-CM

## 2022-11-23 DIAGNOSIS — I10 ESSENTIAL HYPERTENSION: Primary | ICD-10-CM

## 2022-11-23 DIAGNOSIS — I48.0 PAROXYSMAL ATRIAL FIBRILLATION (HCC): ICD-10-CM

## 2022-11-23 PROCEDURE — 99309 SBSQ NF CARE MODERATE MDM 30: CPT | Performed by: INTERNAL MEDICINE

## 2022-11-26 NOTE — PROGRESS NOTES
Date of Encounter: 11/23/2022  Patient:  Ara Scott  YOB: 1945      Chief Complaint: Follow-up on chronic medical conditions    History of Present Illness:  He seems to doing okay at this time. I was informed by nursing staff that he was exposed to COVID-19, however testing has been negative so far. He denies shortness of breath, fever, chills. Past Medical/Surgical/Social History: Per admission note    Physical Exam  Vitals: Blood pressure 124/76. Pulse 80. Respirations 16. Temperature 98.3  Constitutional: No acute distress  Eyes: Sclerae nonicteric. No lid lag or proptosis  HENT: External ears normal. No external lesions on the nose. No lesions on the lips  Neck: No gross masses. Trachea visibly midline  Respiratory: Good air entry bilaterally. No wheezing or crackles  Cardiovascular: Normal S1-S2. No murmurs  Gastrointestinal: No visible masses. Skin: No abnormal rashes. No digital cyanosis  Neurologic: Cranial nerves grossly intact    Assessments   Diagnosis Orders   1. Essential hypertension        2. Paroxysmal atrial fibrillation (HCC)        3. Chronic cerebral ischemia        4. Third degree heart block ([address]        5. SHONDA treated with BiPAP        6. Dementia without behavioral disturbance ([address]        7. Acquired cerebral atrophy ([address]        8. Osteoarthritis of knee, unspecified laterality, unspecified osteoarthritis type            Plan  We will continue current medications unless otherwise noted. I will be notified of any changes to the patient's condition. This note has been created using the [employer] health record, and dictated in part by [employer] dictation system. Despite the documenting physician's best efforts, there may be errors in spelling, grammar or syntax.

## 2022-12-07 ENCOUNTER — OUTSIDE SERVICES (OUTPATIENT)
Dept: INTERNAL MEDICINE | Age: 77
End: 2022-12-07

## 2022-12-07 DIAGNOSIS — E78.2 MIXED HYPERLIPIDEMIA: ICD-10-CM

## 2022-12-07 DIAGNOSIS — F03.90 DEMENTIA WITHOUT BEHAVIORAL DISTURBANCE (HCC): ICD-10-CM

## 2022-12-07 DIAGNOSIS — C61 MALIGNANT NEOPLASM OF PROSTATE (HCC): ICD-10-CM

## 2022-12-07 DIAGNOSIS — I10 ESSENTIAL HYPERTENSION: ICD-10-CM

## 2022-12-07 DIAGNOSIS — I48.0 PAROXYSMAL ATRIAL FIBRILLATION (HCC): Primary | ICD-10-CM

## 2022-12-07 DIAGNOSIS — M17.9 OSTEOARTHRITIS OF KNEE, UNSPECIFIED LATERALITY, UNSPECIFIED OSTEOARTHRITIS TYPE: ICD-10-CM

## 2022-12-07 DIAGNOSIS — G47.33 OSA TREATED WITH BIPAP: ICD-10-CM

## 2022-12-23 NOTE — PROGRESS NOTES
Date of Encounter: 12/7/2022  Patient:  Virginia Orellana  YOB: 1945      Chief Complaint: Follow-up after COVID-19 infection    History of Present Illness:  I was informed by the nursing staff that he contracted COVID-19, and is now out of isolation. He says that he feels fine at this time. Denies fever, chills, shortness of breath, chest pain at this time. Past Medical/Surgical/Social History: Per admission note    Physical Exam  Vitals: Blood pressure 124/76. Pulse 80. Respirations 16. Temperature 98.3  Constitutional: No acute distress  Eyes: Sclerae nonicteric. No lid lag or proptosis  HENT: External ears normal. No external lesions on the nose. No lesions on the lips  Neck: No gross masses. Trachea visibly midline  Respiratory: Good air entry bilaterally. No wheezing or crackles  Cardiovascular: Normal S1-S2. No murmurs  Gastrointestinal: No visible masses. Skin: No abnormal rashes. No digital cyanosis  Neurologic: Cranial nerves grossly intact    Assessments   Diagnosis Orders   1. Paroxysmal atrial fibrillation (HCC)        2. Essential hypertension        3. SHONDA treated with BiPAP        4. Dementia without behavioral disturbance ([facility]        5. Osteoarthritis of knee, unspecified laterality, unspecified osteoarthritis type        6. Malignant neoplasm of prostate ([facility]        7. Mixed hyperlipidemia            Plan  We will continue current medications unless otherwise noted. I will be notified of any changes to the patient's condition. This note has been created using the [facility] health record, and dictated in part by [facility] dictation system. Despite the documenting physician's best efforts, there may be errors in spelling, grammar or syntax.

## 2023-02-01 ENCOUNTER — OUTSIDE SERVICES (OUTPATIENT)
Dept: INTERNAL MEDICINE | Age: 78
End: 2023-02-01

## 2023-02-01 DIAGNOSIS — F33.0 DEPRESSION, MAJOR, RECURRENT, MILD (HCC): ICD-10-CM

## 2023-02-01 DIAGNOSIS — I48.0 PAROXYSMAL ATRIAL FIBRILLATION (HCC): Primary | ICD-10-CM

## 2023-02-01 DIAGNOSIS — C61 MALIGNANT NEOPLASM OF PROSTATE (HCC): ICD-10-CM

## 2023-02-01 DIAGNOSIS — M17.9 OSTEOARTHRITIS OF KNEE, UNSPECIFIED LATERALITY, UNSPECIFIED OSTEOARTHRITIS TYPE: ICD-10-CM

## 2023-02-01 DIAGNOSIS — E78.2 MIXED HYPERLIPIDEMIA: ICD-10-CM

## 2023-02-01 DIAGNOSIS — F03.918 DEMENTIA WITH BEHAVIORAL DISTURBANCE: ICD-10-CM

## 2023-02-01 DIAGNOSIS — I10 ESSENTIAL HYPERTENSION: ICD-10-CM

## 2023-02-02 NOTE — PROGRESS NOTES
Date of Encounter: 2/1/2023  Patient:  Jaki Patten  YOB: 1945      Chief Complaint: Follow-up on chronic medical conditions    History of Present Illness:  He is sitting on his wheelchair and seems to be doing okay at this time. Has no symptoms at this time. No issues reported by nursing staff    Past Medical/Surgical/Social History: Per admission note    Physical Exam  Vitals: Blood pressure 148/78. Pulse 66. Respirations 18. Temperature 98.2  Constitutional: No acute distress  Eyes: Sclerae nonicteric. No lid lag or proptosis  HENT: External ears normal. No external lesions on the nose. No lesions on the lips  Neck: No gross masses. Trachea visibly midline  Respiratory: Good air entry bilaterally. No wheezing or crackles  Cardiovascular: Normal S1-S2. No murmurs  Gastrointestinal: No visible masses. Skin: No abnormal rashes. No digital cyanosis  Neurologic: Cranial nerves grossly intact    Assessments   Diagnosis Orders   1. Paroxysmal atrial fibrillation (HCC)        2. Depression, major, recurrent, mild ([address]        3. Malignant neoplasm of prostate ([address]        4. Dementia with behavioral disturbance        5. Essential hypertension        6. Osteoarthritis of knee, unspecified laterality, unspecified osteoarthritis type        7. Mixed hyperlipidemia            Plan  We will continue current medications unless otherwise noted. I will be notified of any changes to the patient's condition. This note has been created using the [employer] health record, and dictated in part by [id number] dictation system. Despite the documenting physician's best efforts, there may be errors in spelling, grammar or syntax.

## 2023-03-29 ENCOUNTER — OUTSIDE SERVICES (OUTPATIENT)
Dept: INTERNAL MEDICINE | Age: 78
End: 2023-03-29

## 2023-03-29 DIAGNOSIS — I10 ESSENTIAL HYPERTENSION: ICD-10-CM

## 2023-03-29 DIAGNOSIS — F03.918 DEMENTIA WITH BEHAVIORAL DISTURBANCE (HCC): ICD-10-CM

## 2023-03-29 DIAGNOSIS — G47.33 OSA TREATED WITH BIPAP: ICD-10-CM

## 2023-03-29 DIAGNOSIS — C61 MALIGNANT NEOPLASM OF PROSTATE (HCC): ICD-10-CM

## 2023-03-29 DIAGNOSIS — I48.0 PAROXYSMAL ATRIAL FIBRILLATION (HCC): Primary | ICD-10-CM

## 2023-03-29 DIAGNOSIS — F33.0 DEPRESSION, MAJOR, RECURRENT, MILD (HCC): ICD-10-CM

## 2023-03-29 DIAGNOSIS — M17.9 OSTEOARTHRITIS OF KNEE, UNSPECIFIED LATERALITY, UNSPECIFIED OSTEOARTHRITIS TYPE: ICD-10-CM

## 2023-03-29 DIAGNOSIS — E78.2 MIXED HYPERLIPIDEMIA: ICD-10-CM

## 2023-03-29 NOTE — PROGRESS NOTES
Date of Encounter: 3/29/2023  Patient:  Duane Bow  YOB: 1945      Chief Complaint: Follow-up of chronic medical conditions    History of Present Illness:  He says that he is feeling okay today. Has no symptoms at this time. No issues reported by nursing staff    Past Medical/Surgical/Social History: Per admission note    Physical Exam  Vitals: Blood pressure 143/78. Pulse 66. Temperature 98.2. Respirations 18  Constitutional: No acute distress  Eyes: Sclerae nonicteric. No lid lag or proptosis  HENT: External ears normal. No external lesions on the nose. No lesions on the lips  Neck: No gross masses. Trachea visibly midline  Respiratory: Good air entry bilaterally. No wheezing or crackles  Cardiovascular: Normal S1-S2. No murmurs  Gastrointestinal: No visible masses. Skin: No abnormal rashes. No digital cyanosis  Neurologic: Cranial nerves grossly intact    Assessments   Diagnosis Orders   1. Paroxysmal atrial fibrillation (HCC)        2. Depression, major, recurrent, mild ([address]        3. Malignant neoplasm of prostate ([address]        4. Dementia with behavioral disturbance ([address]        5. Essential hypertension        6. Osteoarthritis of knee, unspecified laterality, unspecified osteoarthritis type        7. Mixed hyperlipidemia        8. SHONDA treated with BiPAP            Plan  We will continue current medications unless otherwise noted. I will be notified of any changes to the patient's condition. This note has been created using the [employer] health record, and dictated in part by [email or web address] dictation system. Despite the documenting physician's best efforts, there may be errors in spelling, grammar or syntax.

## 2023-05-24 ENCOUNTER — OUTSIDE SERVICES (OUTPATIENT)
Dept: INTERNAL MEDICINE | Age: 78
End: 2023-05-24
Payer: MEDICARE

## 2023-05-24 DIAGNOSIS — I48.0 PAROXYSMAL ATRIAL FIBRILLATION (HCC): Primary | ICD-10-CM

## 2023-05-24 DIAGNOSIS — F33.0 DEPRESSION, MAJOR, RECURRENT, MILD (HCC): ICD-10-CM

## 2023-05-24 DIAGNOSIS — E78.2 MIXED HYPERLIPIDEMIA: ICD-10-CM

## 2023-05-24 DIAGNOSIS — C61 MALIGNANT NEOPLASM OF PROSTATE (HCC): ICD-10-CM

## 2023-05-24 DIAGNOSIS — M17.9 OSTEOARTHRITIS OF KNEE, UNSPECIFIED LATERALITY, UNSPECIFIED OSTEOARTHRITIS TYPE: ICD-10-CM

## 2023-05-24 DIAGNOSIS — F03.90 DEMENTIA WITHOUT BEHAVIORAL DISTURBANCE (HCC): ICD-10-CM

## 2023-05-24 DIAGNOSIS — I10 ESSENTIAL HYPERTENSION: ICD-10-CM

## 2023-05-24 PROCEDURE — 99309 SBSQ NF CARE MODERATE MDM 30: CPT | Performed by: INTERNAL MEDICINE

## 2023-05-24 NOTE — PROGRESS NOTES
Date of Encounter: 5/24/2023  Patient:  Clover Marvin  YOB: 1945      Chief Complaint: Follow-up on chronic medical conditions    History of Present Illness:  No issues reported by nursing staff. He has no complaints. Past Medical/Surgical/Social History: Per admission note    Physical Exam  Vitals: Blood pressure 122/66. Pulse 88. Respirations 20. Temperature 97.9  Constitutional: No acute distress  Eyes: Sclerae nonicteric. No lid lag or proptosis  HENT: External ears normal. No external lesions on the nose. No lesions on the lips  Neck: No gross masses. Trachea visibly midline  Respiratory: Good air entry bilaterally. No wheezing or crackles  Cardiovascular: Normal S1-S2. No murmurs  Gastrointestinal: No visible masses. Skin: No abnormal rashes. No digital cyanosis  Neurologic: Cranial nerves grossly intact    Assessments   Diagnosis Orders   1. Paroxysmal atrial fibrillation (HCC)        2. Depression, major, recurrent, mild ([address]        3. Malignant neoplasm of prostate ([address]        4. Essential hypertension        5. Osteoarthritis of knee, unspecified laterality, unspecified osteoarthritis type        6. Mixed hyperlipidemia        7. Dementia without behavioral disturbance ([address]            Plan  We will continue current medications unless otherwise noted. I will be notified of any changes to the patient's condition. This note has been created using the [employer] health record, and dictated in part by [employer] dictation system. Despite the documenting physician's best efforts, there may be errors in spelling, grammar or syntax.

## 2023-07-19 ENCOUNTER — OUTSIDE SERVICES (OUTPATIENT)
Dept: INTERNAL MEDICINE | Age: 78
End: 2023-07-19

## 2023-07-19 DIAGNOSIS — E78.2 MIXED HYPERLIPIDEMIA: ICD-10-CM

## 2023-07-19 DIAGNOSIS — C61 MALIGNANT NEOPLASM OF PROSTATE (HCC): ICD-10-CM

## 2023-07-19 DIAGNOSIS — F03.90 DEMENTIA WITHOUT BEHAVIORAL DISTURBANCE (HCC): ICD-10-CM

## 2023-07-19 DIAGNOSIS — D68.9 COAGULATION DEFECT (HCC): ICD-10-CM

## 2023-07-19 DIAGNOSIS — I48.0 PAROXYSMAL ATRIAL FIBRILLATION (HCC): Primary | ICD-10-CM

## 2023-07-19 DIAGNOSIS — M17.9 OSTEOARTHRITIS OF KNEE, UNSPECIFIED LATERALITY, UNSPECIFIED OSTEOARTHRITIS TYPE: ICD-10-CM

## 2023-07-19 DIAGNOSIS — F33.0 DEPRESSION, MAJOR, RECURRENT, MILD (HCC): ICD-10-CM

## 2023-07-19 DIAGNOSIS — I10 ESSENTIAL HYPERTENSION: ICD-10-CM

## 2023-07-20 PROBLEM — D68.9 COAGULATION DEFECT (HCC): Status: ACTIVE | Noted: 2023-07-20

## 2023-07-20 NOTE — PROGRESS NOTES
Date of Encounter: 7/19/2023  Patient:  Danieilto Severino  YOB: 1945      Chief Complaint: Follow-up on chronic medical conditions    History of Present Illness:  No issues reported by nursing staff. He has no symptoms at this time. Past Medical/Surgical/Social History: Per admission note    Physical Exam  Vitals: Blood pressure 130/70. Pulse 90. Temperature 97.8. Respirations 17  Constitutional: No acute distress  Eyes: Sclerae nonicteric. No lid lag or proptosis  HENT: External ears normal. No external lesions on the nose. No lesions on the lips  Neck: No gross masses. Trachea visibly midline  Respiratory: Good air entry bilaterally. No wheezing or crackles  Cardiovascular: Normal S1-S2. No murmurs  Gastrointestinal: No visible masses. Skin: No abnormal rashes. No digital cyanosis  Neurologic: Cranial nerves grossly intact    Assessments   Diagnosis Orders   1. Paroxysmal atrial fibrillation (HCC)        2. Coagulation defect ([address])        3. Depression, major, recurrent, mild ([address])        4. Malignant neoplasm of prostate ([address])        5. Essential hypertension        6. Osteoarthritis of knee, unspecified laterality, unspecified osteoarthritis type        7. Dementia without behavioral disturbance ([address])        8. Mixed hyperlipidemia            Plan  I will be notified of any changes to the patient's condition. Unless otherwise noted, a gradual dose reduction in medications is not indicated at this time. This note has been created using the [employer] health record, and dictated in part by [employer] dictation system. Despite the documenting physician's best efforts, there may be errors in spelling, grammar or syntax.

## 2023-09-13 ENCOUNTER — OUTSIDE SERVICES (OUTPATIENT)
Dept: INTERNAL MEDICINE | Age: 78
End: 2023-09-13

## 2023-09-13 DIAGNOSIS — I48.0 PAROXYSMAL ATRIAL FIBRILLATION (HCC): Primary | ICD-10-CM

## 2023-09-13 DIAGNOSIS — C61 MALIGNANT NEOPLASM OF PROSTATE (HCC): ICD-10-CM

## 2023-09-13 DIAGNOSIS — M17.9 OSTEOARTHRITIS OF KNEE, UNSPECIFIED LATERALITY, UNSPECIFIED OSTEOARTHRITIS TYPE: ICD-10-CM

## 2023-09-13 DIAGNOSIS — F33.0 DEPRESSION, MAJOR, RECURRENT, MILD (HCC): ICD-10-CM

## 2023-09-13 DIAGNOSIS — I10 ESSENTIAL HYPERTENSION: ICD-10-CM

## 2023-09-13 DIAGNOSIS — F03.90 DEMENTIA WITHOUT BEHAVIORAL DISTURBANCE (HCC): ICD-10-CM

## 2023-09-13 DIAGNOSIS — E78.2 MIXED HYPERLIPIDEMIA: ICD-10-CM

## 2023-09-15 NOTE — PROGRESS NOTES
Date of Encounter: 9/13/2023  Patient:  Codey Bridges  YOB: 1945      Chief Complaint: Follow up on chronic medical conditions    History of Present Illness:  No issues reported by nursing staff. No reported complaints. Sits in his chair comfortably    Past Medical/Surgical/Social History: Per admission note    Physical Exam  Vitals: /86. P 72. T 98.2. RR 22. Constitutional: No acute distress  Eyes: Sclerae nonicteric. No lid lag or proptosis  HENT: External ears normal. No external lesions on the nose. No lesions on the lips  Neck: No gross masses. Trachea visibly midline  Respiratory: Good air entry bilaterally. No wheezing or crackles  Cardiovascular: Normal S1-S2. No murmurs  Gastrointestinal: No visible masses. Skin: No abnormal rashes. No digital cyanosis  Neurologic: Cranial nerves grossly intact    Assessments   Diagnosis Orders   1. Paroxysmal atrial fibrillation (HCC)        2. Depression, major, recurrent, mild ([address])        3. Essential hypertension        4. Malignant neoplasm of prostate ([address])        5. Osteoarthritis of knee, unspecified laterality, unspecified osteoarthritis type        6. Dementia without behavioral disturbance ([address])        7. Mixed hyperlipidemia            Plan  I will be notified of any changes to the patient's condition. Unless otherwise noted, a gradual dose reduction in medications is not indicated at this time. This note has been created using the [email or web address] health record, and dictated in part by [email or web address] dictation system. Despite the documenting physician's best efforts, there may be errors in spelling, grammar or syntax.

## 2023-10-10 LAB
ALBUMIN/GLOBULIN RATIO: 1.27 G/DL
ALBUMIN: 3.3 G/DL (ref 3.5–5)
ALP BLD-CCNC: 67 UNITS/L (ref 38–126)
ALT SERPL-CCNC: 24 UNITS/L (ref 4–50)
ANION GAP SERPL CALCULATED.3IONS-SCNC: 10.1 MMOL/L (ref 4–12)
AST SERPL-CCNC: 28 UNITS/L (ref 17–59)
BILIRUB SERPL-MCNC: 0.9 MG/DL (ref 0.2–1.3)
BUN BLDV-MCNC: 12 MG/DL (ref 9–20)
CALCIUM SERPL-MCNC: 8.9 MG/DL (ref 8.4–10.2)
CHLORIDE BLD-SCNC: 102 MMOL/L (ref 98–120)
CO2: 29 MMOL/L (ref 22–31)
CREAT SERPL-MCNC: 0.9 MG/DL (ref 0.7–1.3)
GFR CALCULATED: > 60
GLOBULIN: 2.6 G/DL
GLUCOSE: 75 MG/DL (ref 75–110)
HCT VFR BLD CALC: 43 % (ref 42–52)
HEMOGLOBIN: 13.3 (ref 13.8–17.8)
MCH RBC QN AUTO: 28.3 PG (ref 28.5–32.5)
MCHC RBC AUTO-ENTMCNC: 30.8 G/DL (ref 32–37)
MCV RBC AUTO: 91.9 FL (ref 80–94)
PDW BLD-RTO: 14.5 % (ref 10–15.5)
PLATELET # BLD: 203 THOU/MM3 (ref 130–400)
POTASSIUM SERPL-SCNC: 4.1 MMOL/L (ref 3.6–5)
RBC: 4.68 M/UL (ref 4.7–6.1)
SODIUM BLD-SCNC: 137 MMOL/L (ref 135–145)
TOTAL PROTEIN, SERUM: 5.9 G/DL (ref 6.3–8.2)
TSH SERPL DL<=0.05 MIU/L-ACNC: 5.03 MIU/ML (ref 0.49–4.67)
WBC: 6.1 THOU/ML3 (ref 4.8–10.8)

## 2023-11-13 LAB
BASOPHILS %: 0.58 (ref 0–3)
BASOPHILS ABSOLUTE: 0.05 (ref 0–0.3)
EOSINOPHILS %: 1.11 (ref 0–10)
EOSINOPHILS ABSOLUTE: 0.09 (ref 0–1.1)
HCT VFR BLD CALC: 46.2 % (ref 42–52)
HEMOGLOBIN: 14.4 (ref 13.8–17.8)
LYMPHOCYTE %: 13.83 (ref 20–51.1)
LYMPHOCYTES ABSOLUTE: 1.09 (ref 1–5.5)
MCH RBC QN AUTO: 28.3 PG (ref 28.5–32.5)
MCHC RBC AUTO-ENTMCNC: 31.1 G/DL (ref 32–37)
MCV RBC AUTO: 91.1 FL (ref 80–94)
MONOCYTES %: 5.77 (ref 1.7–9.3)
MONOCYTES ABSOLUTE: 0.45 (ref 0.1–1)
NEUTROPHILS %: 78.72 (ref 42.2–75.2)
NEUTROPHILS ABSOLUTE: 6.18 (ref 2–8.1)
PDW BLD-RTO: 15 % (ref 10–15.5)
PLATELET # BLD: 199.6 THOU/MM3 (ref 130–400)
RBC: 5.07 M/UL (ref 4.7–6.1)
TSH SERPL DL<=0.05 MIU/L-ACNC: 4.92 MIU/ML (ref 0.49–4.67)
WBC: 7.9 THOU/ML3 (ref 4.8–10.8)

## 2023-11-15 ENCOUNTER — OUTSIDE SERVICES (OUTPATIENT)
Dept: INTERNAL MEDICINE | Age: 78
End: 2023-11-15
Payer: MEDICARE

## 2023-11-15 DIAGNOSIS — F03.90 DEMENTIA WITHOUT BEHAVIORAL DISTURBANCE (HCC): ICD-10-CM

## 2023-11-15 DIAGNOSIS — M17.9 OSTEOARTHRITIS OF KNEE, UNSPECIFIED LATERALITY, UNSPECIFIED OSTEOARTHRITIS TYPE: ICD-10-CM

## 2023-11-15 DIAGNOSIS — F33.0 DEPRESSION, MAJOR, RECURRENT, MILD (HCC): ICD-10-CM

## 2023-11-15 DIAGNOSIS — E78.2 MIXED HYPERLIPIDEMIA: ICD-10-CM

## 2023-11-15 DIAGNOSIS — I10 ESSENTIAL HYPERTENSION: ICD-10-CM

## 2023-11-15 DIAGNOSIS — C61 MALIGNANT NEOPLASM OF PROSTATE (HCC): ICD-10-CM

## 2023-11-15 DIAGNOSIS — I48.0 PAROXYSMAL ATRIAL FIBRILLATION (HCC): Primary | ICD-10-CM

## 2023-11-15 PROCEDURE — 99309 SBSQ NF CARE MODERATE MDM 30: CPT | Performed by: INTERNAL MEDICINE

## 2023-11-15 NOTE — PROGRESS NOTES
Date of Encounter: 11/15/2023  Patient:  John Sparrow  YOB: 1945      Chief Complaint: Follow up on chronic medical conditions    History of Present Illness:  No issues reported by nursing staff. No reported complaints. He sits in his chair comfortably. Past Medical/Surgical/Social History: Per admission note    Physical Exam  Vitals: /60. P 69. T 98.2. R 18  Constitutional: No acute distress  Eyes: Sclerae nonicteric. No lid lag or proptosis  HENT: External ears normal. No external lesions on the nose. No lesions on the lips  Neck: No gross masses. Trachea visibly midline  Respiratory: Good air entry bilaterally. No wheezing or crackles  Cardiovascular: Normal S1-S2. No murmurs  Gastrointestinal: No visible masses. Skin: No abnormal rashes. No digital cyanosis  Neurologic: Cranial nerves grossly intact    Assessments   Diagnosis Orders   1. Paroxysmal atrial fibrillation (HCC)        2. Depression, major, recurrent, mild ([address])        3. Essential hypertension        4. Malignant neoplasm of prostate ([address])        5. Osteoarthritis of knee, unspecified laterality, unspecified osteoarthritis type        6. Dementia without behavioral disturbance ([address])        7. Mixed hyperlipidemia            Plan  I will be notified of any changes to the patient's condition. Unless otherwise noted, a gradual dose reduction in medications is not indicated at this time. This note has been created using the [employer] health record, and dictated in part by [employer] dictation system. Despite the documenting physician's best efforts, there may be errors in spelling, grammar or syntax.

## 2023-12-13 ENCOUNTER — OUTSIDE SERVICES (OUTPATIENT)
Dept: INTERNAL MEDICINE | Age: 78
End: 2023-12-13

## 2023-12-13 DIAGNOSIS — I10 ESSENTIAL HYPERTENSION: ICD-10-CM

## 2023-12-13 DIAGNOSIS — M17.9 OSTEOARTHRITIS OF KNEE, UNSPECIFIED LATERALITY, UNSPECIFIED OSTEOARTHRITIS TYPE: ICD-10-CM

## 2023-12-13 DIAGNOSIS — F03.90 DEMENTIA WITHOUT BEHAVIORAL DISTURBANCE (HCC): ICD-10-CM

## 2023-12-13 DIAGNOSIS — F33.0 DEPRESSION, MAJOR, RECURRENT, MILD (HCC): ICD-10-CM

## 2023-12-13 DIAGNOSIS — C61 MALIGNANT NEOPLASM OF PROSTATE (HCC): ICD-10-CM

## 2023-12-13 DIAGNOSIS — I48.0 PAROXYSMAL ATRIAL FIBRILLATION (HCC): Primary | ICD-10-CM

## 2023-12-16 NOTE — PROGRESS NOTES
Date of Encounter: 12/13/2023  Patient:  Ebony Jose  YOB: 1945      Chief Complaint: Follow up on chronic medical conditions    History of Present Illness:  No issues reported by nursing staff. No reported complaints. HE says that feels fine    Past Medical/Surgical/Social History: Per admission note    Physical Exam  Vitals: Blood Pressure 134/76. Pulse 75. Temperature 98. 6. Respirations 18  Constitutional: No acute distress  Eyes: Sclerae nonicteric. No lid lag or proptosis  HENT: External ears normal. No external lesions on the nose. No lesions on the lips  Neck: No gross masses. Trachea visibly midline  Respiratory: Good air entry bilaterally. No wheezing or crackles  Cardiovascular: Normal S1-S2. No murmurs  Gastrointestinal: No visible masses. Skin: No abnormal rashes. No digital cyanosis  Neurologic: Cranial nerves grossly intact    Assessments   Diagnosis Orders   1. Paroxysmal atrial fibrillation (HCC)        2. Depression, major, recurrent, mild ([address])        3. Essential hypertension        4. Malignant neoplasm of prostate ([address])        5. Osteoarthritis of knee, unspecified laterality, unspecified osteoarthritis type        6. Dementia without behavioral disturbance ([address])            Plan  I will be notified of any changes to the patient's condition. Unless otherwise noted, a gradual dose reduction in medications is not indicated at this time. This note has been created using the [employer] health record, and dictated in part by [employer] dictation system. Despite the documenting physician's best efforts, there may be errors in spelling, grammar or syntax.

## 2023-12-19 PROBLEM — R00.1 BRADYCARDIA, UNSPECIFIED: Status: ACTIVE | Noted: 2021-10-29

## 2024-01-10 ENCOUNTER — OUTSIDE SERVICES (OUTPATIENT)
Dept: INTERNAL MEDICINE | Age: 79
End: 2024-01-10

## 2024-01-10 DIAGNOSIS — I10 ESSENTIAL HYPERTENSION: ICD-10-CM

## 2024-01-10 DIAGNOSIS — D68.9 COAGULATION DEFECT (HCC): ICD-10-CM

## 2024-01-10 DIAGNOSIS — F01.511 VASCULAR DEMENTIA WITH AGITATION, UNSPECIFIED DEMENTIA SEVERITY (HCC): ICD-10-CM

## 2024-01-10 DIAGNOSIS — I48.0 PAROXYSMAL ATRIAL FIBRILLATION (HCC): Primary | ICD-10-CM

## 2024-01-10 DIAGNOSIS — F33.0 DEPRESSION, MAJOR, RECURRENT, MILD (HCC): ICD-10-CM

## 2024-01-10 DIAGNOSIS — M17.9 OSTEOARTHRITIS OF KNEE, UNSPECIFIED LATERALITY, UNSPECIFIED OSTEOARTHRITIS TYPE: ICD-10-CM

## 2024-01-10 DIAGNOSIS — E78.2 MIXED HYPERLIPIDEMIA: ICD-10-CM

## 2024-01-10 DIAGNOSIS — C61 MALIGNANT NEOPLASM OF PROSTATE (HCC): ICD-10-CM

## 2024-01-13 NOTE — PROGRESS NOTES
Date of Encounter: 1/10/2024  Patient:  Sang Martinez  YOB: 1945      Chief Complaint: Follow up on chronic medical conditions    History of Present Illness:  No issues reported by nursing staff. No reported complaints.  He sits in his chair no distress    Past Medical/Surgical/Social History: Per admission note    Physical Exam  Vitals: Blood Pressure 109/69. Pulse 69. Temperature 97.6. Respirations 18  Constitutional: No acute distress  Eyes: Sclerae nonicteric. No lid lag or proptosis  HENT: External ears normal. No external lesions on the nose. No lesions on the lips  Neck: No gross masses. Trachea visibly midline  Respiratory: Good air entry bilaterally. No wheezing or crackles  Cardiovascular: Normal S1-S2. No murmurs  Gastrointestinal: No visible masses.   Skin: No abnormal rashes. No digital cyanosis  Neurologic: Cranial nerves grossly intact    Assessments   Diagnosis Orders   1. Paroxysmal atrial fibrillation (HCC)        2. Vascular dementia with agitation, unspecified dementia severity (HCC)        3. Depression, major, recurrent, mild (HCC)        4. Coagulation defect (HCC)        5. Malignant neoplasm of prostate (HCC)        6. Essential hypertension        7. Osteoarthritis of knee, unspecified laterality, unspecified osteoarthritis type        8. Mixed hyperlipidemia            Plan  I will be notified of any changes to the patient's condition. Unless otherwise noted, a gradual dose reduction in medications is not indicated at this time.      This note has been created using the [employer] electronic health record, and dictated in part by [employer] dictation system. Despite the documenting physician's best efforts, there may be errors in spelling, grammar or syntax.

## 2024-02-07 ENCOUNTER — OUTSIDE SERVICES (OUTPATIENT)
Dept: INTERNAL MEDICINE | Age: 79
End: 2024-02-07
Payer: MEDICARE

## 2024-02-07 DIAGNOSIS — I10 ESSENTIAL HYPERTENSION: ICD-10-CM

## 2024-02-07 DIAGNOSIS — F33.0 DEPRESSION, MAJOR, RECURRENT, MILD (HCC): ICD-10-CM

## 2024-02-07 DIAGNOSIS — C61 MALIGNANT NEOPLASM OF PROSTATE (HCC): ICD-10-CM

## 2024-02-07 DIAGNOSIS — M17.9 OSTEOARTHRITIS OF KNEE, UNSPECIFIED LATERALITY, UNSPECIFIED OSTEOARTHRITIS TYPE: ICD-10-CM

## 2024-02-07 DIAGNOSIS — E78.2 MIXED HYPERLIPIDEMIA: ICD-10-CM

## 2024-02-07 DIAGNOSIS — F01.511 VASCULAR DEMENTIA WITH AGITATION, UNSPECIFIED DEMENTIA SEVERITY (HCC): ICD-10-CM

## 2024-02-07 DIAGNOSIS — I48.0 PAROXYSMAL ATRIAL FIBRILLATION (HCC): Primary | ICD-10-CM

## 2024-02-07 PROCEDURE — 99309 SBSQ NF CARE MODERATE MDM 30: CPT | Performed by: INTERNAL MEDICINE

## 2024-02-07 NOTE — PROGRESS NOTES
Date of Encounter: 2/7/2024  Patient:  Sang Martinez  YOB: 1945      Chief Complaint: Follow up on chronic medical conditions    History of Present Illness:  No issues reported by nursing staff. No reported complaints. He sits in his chair in no distress    Past Medical/Surgical/Social History: Per admission note    Physical Exam  Vitals: Blood Pressure 132/76. Pulse 69. Temperature 98. Respirations 18  Constitutional: No acute distress  Eyes: Sclerae nonicteric. No lid lag or proptosis  HENT: External ears normal. No external lesions on the nose. No lesions on the lips  Neck: No gross masses. Trachea visibly midline  Respiratory: Good air entry bilaterally. No wheezing or crackles  Cardiovascular: Normal S1-S2. No murmurs  Gastrointestinal: No visible masses.   Skin: No abnormal rashes. No digital cyanosis  Neurologic: Cranial nerves grossly intact    Assessments   Diagnosis Orders   1. Paroxysmal atrial fibrillation (HCC)        2. Vascular dementia with agitation, unspecified dementia severity (HCC)        3. Depression, major, recurrent, mild (HCC)        4. Essential hypertension        5. Osteoarthritis of knee, unspecified laterality, unspecified osteoarthritis type        6. Mixed hyperlipidemia        7. Malignant neoplasm of prostate (HCC)            Plan  I will be notified of any changes to the patient's condition. Unless otherwise noted, a gradual dose reduction in medications is not indicated at this time.      This note has been created using the Epic electronic health record, and dictated in part by [employer] dictation system. Despite the documenting physician's best efforts, there may be errors in spelling, grammar or syntax.

## 2024-02-14 ENCOUNTER — OUTSIDE SERVICES (OUTPATIENT)
Dept: INTERNAL MEDICINE | Age: 79
End: 2024-02-14

## 2024-02-14 DIAGNOSIS — F01.511 VASCULAR DEMENTIA WITH AGITATION, UNSPECIFIED DEMENTIA SEVERITY (HCC): ICD-10-CM

## 2024-02-14 DIAGNOSIS — M17.9 OSTEOARTHRITIS OF KNEE, UNSPECIFIED LATERALITY, UNSPECIFIED OSTEOARTHRITIS TYPE: ICD-10-CM

## 2024-02-14 DIAGNOSIS — I10 ESSENTIAL HYPERTENSION: ICD-10-CM

## 2024-02-14 DIAGNOSIS — I48.0 PAROXYSMAL ATRIAL FIBRILLATION (HCC): Primary | ICD-10-CM

## 2024-02-14 DIAGNOSIS — C61 MALIGNANT NEOPLASM OF PROSTATE (HCC): ICD-10-CM

## 2024-02-14 DIAGNOSIS — F33.0 DEPRESSION, MAJOR, RECURRENT, MILD (HCC): ICD-10-CM

## 2024-02-14 DIAGNOSIS — E78.2 MIXED HYPERLIPIDEMIA: ICD-10-CM

## 2024-02-15 NOTE — PROGRESS NOTES
Date of Encounter: 2/14/2024  Patient:  Sang Martinez  YOB: 1945      Chief Complaint: Scrotal lesion    History of Present Illness:  I was informed by the nursing staff that he has a lesion on his right scrotum. He has an ulcer on his scrotum and the lesion was noted by wound care. He says that there is no pain, or itching. No penile pain or discharge.     Past Medical/Surgical/Social History: Per admission note    Physical Exam  Vitals: Blood Pressure 132/76. Pulse 69. Temperature 98. Respirations 18  Constitutional: No acute distress  Eyes: Sclerae nonicteric. No lid lag or proptosis  HENT: External ears normal. No external lesions on the nose. No lesions on the lips  Neck: No gross masses. Trachea visibly midline  Respiratory: Good air entry bilaterally. No wheezing or crackles  Cardiovascular: Normal S1-S2. No murmurs  Gastrointestinal: No visible masses.   Skin: No abnormal rashes. No digital cyanosis  Neurologic: Cranial nerves grossly intact  Genitalia: Mild bluish macule on right scrotum.     Assessments   Diagnosis Orders   1. Paroxysmal atrial fibrillation (HCC)        2. Vascular dementia with agitation, unspecified dementia severity (HCC)        3. Depression, major, recurrent, mild (HCC)        4. Essential hypertension        5. Osteoarthritis of knee, unspecified laterality, unspecified osteoarthritis type        6. Mixed hyperlipidemia        7. Malignant neoplasm of prostate (HCC)            Plan  Can continue to monitor lesion for now, no evidence of infection or malignancy  I will be notified of any changes to the patient's condition. Unless otherwise noted, a gradual dose reduction in medications is not indicated at this time.      This note has been created using the Epic electronic health record, and dictated in part by [employer] dictation system. Despite the documenting physician's best efforts, there may be errors in spelling, grammar or syntax.

## 2024-03-13 ENCOUNTER — OUTSIDE SERVICES (OUTPATIENT)
Dept: INTERNAL MEDICINE | Age: 79
End: 2024-03-13
Payer: MEDICARE

## 2024-03-13 DIAGNOSIS — E78.2 MIXED HYPERLIPIDEMIA: ICD-10-CM

## 2024-03-13 DIAGNOSIS — C61 MALIGNANT NEOPLASM OF PROSTATE (HCC): ICD-10-CM

## 2024-03-13 DIAGNOSIS — I48.0 PAROXYSMAL ATRIAL FIBRILLATION (HCC): Primary | ICD-10-CM

## 2024-03-13 DIAGNOSIS — I10 ESSENTIAL HYPERTENSION: ICD-10-CM

## 2024-03-13 DIAGNOSIS — F33.0 DEPRESSION, MAJOR, RECURRENT, MILD (HCC): ICD-10-CM

## 2024-03-13 DIAGNOSIS — M17.9 OSTEOARTHRITIS OF KNEE, UNSPECIFIED LATERALITY, UNSPECIFIED OSTEOARTHRITIS TYPE: ICD-10-CM

## 2024-03-13 DIAGNOSIS — F01.511 VASCULAR DEMENTIA WITH AGITATION, UNSPECIFIED DEMENTIA SEVERITY (HCC): ICD-10-CM

## 2024-03-13 PROCEDURE — 99309 SBSQ NF CARE MODERATE MDM 30: CPT | Performed by: INTERNAL MEDICINE

## 2024-03-20 NOTE — PROGRESS NOTES
Date of Encounter: 3/13/2024  Patient:  Sang Martinez  YOB: 1945      Chief Complaint: Follow up on chronic medical conditions    History of Present Illness:  No issues reported by nursing staff. No reported complaints.  Sits in his chair in no distress, as usual    Past Medical/Surgical/Social History: Per admission note    Physical Exam  Vitals: Blood Pressure 130/90. Pulse 75. Temperature 98.5. Respirations 20  Constitutional: No acute distress  Eyes: Sclerae nonicteric. No lid lag or proptosis  HENT: External ears normal. No external lesions on the nose. No lesions on the lips  Neck: No gross masses. Trachea visibly midline  Respiratory: Good air entry bilaterally. No wheezing or crackles  Cardiovascular: Normal S1-S2. No murmurs  Gastrointestinal: No visible masses.   Skin: No abnormal rashes. No digital cyanosis  Neurologic: Cranial nerves grossly intact    Assessments   Diagnosis Orders   1. Paroxysmal atrial fibrillation (HCC)        2. Vascular dementia with agitation, unspecified dementia severity (HCC)        3. Depression, major, recurrent, mild (HCC)        4. Essential hypertension        5. Mixed hyperlipidemia        6. Osteoarthritis of knee, unspecified laterality, unspecified osteoarthritis type        7. Malignant neoplasm of prostate (HCC)            Plan  I will be notified of any changes to the patient's condition. Unless otherwise noted, a gradual dose reduction in medications is not indicated at this time.      This note has been created using the Epic electronic health record, and dictated in part by [employer] dictation system. Despite the documenting physician's best efforts, there may be errors in spelling, grammar or syntax.

## 2024-04-03 ENCOUNTER — OUTSIDE SERVICES (OUTPATIENT)
Dept: INTERNAL MEDICINE | Age: 79
End: 2024-04-03

## 2024-04-03 DIAGNOSIS — I10 ESSENTIAL HYPERTENSION: ICD-10-CM

## 2024-04-03 DIAGNOSIS — I48.0 PAROXYSMAL ATRIAL FIBRILLATION (HCC): Primary | ICD-10-CM

## 2024-04-03 DIAGNOSIS — F03.918 DEMENTIA WITH BEHAVIORAL DISTURBANCE (HCC): ICD-10-CM

## 2024-04-03 DIAGNOSIS — I73.9 PERIPHERAL VASCULAR DISEASE (HCC): ICD-10-CM

## 2024-04-03 DIAGNOSIS — C61 MALIGNANT NEOPLASM OF PROSTATE (HCC): ICD-10-CM

## 2024-04-03 DIAGNOSIS — E78.2 MIXED HYPERLIPIDEMIA: ICD-10-CM

## 2024-04-03 DIAGNOSIS — F33.0 DEPRESSION, MAJOR, RECURRENT, MILD (HCC): ICD-10-CM

## 2024-04-03 DIAGNOSIS — M17.9 OSTEOARTHRITIS OF KNEE, UNSPECIFIED LATERALITY, UNSPECIFIED OSTEOARTHRITIS TYPE: ICD-10-CM

## 2024-04-03 DIAGNOSIS — F03.90 DEMENTIA WITHOUT BEHAVIORAL DISTURBANCE (HCC): ICD-10-CM

## 2024-04-03 DIAGNOSIS — F01.511 VASCULAR DEMENTIA WITH AGITATION, UNSPECIFIED DEMENTIA SEVERITY (HCC): ICD-10-CM

## 2024-04-04 NOTE — PROGRESS NOTES
Date of Admission: 4/2/2024  Date of Encounter: 4/3/2024  Patient:  Sang Martinez  YOB: 1945      Chief Complaint: Admission after angiogram for peripheral artery disease    History of Present Illness:  He underwent an angiogram for peripheral artery disease with toe ulcers on 4/1/2024, however, he was suspected to have a pseudoaneurysm or hematoma after significant bruising, and therefore was kept overnight for monitoring.  He was discharged afterwards in stable condition.  Seems to be doing better now.  Says that it is slightly sore in his groin, but is otherwise doing okay.  Denies fever or chills.  Denies chest pain, shortness of breath.    Past Medical History: Atrial fibrillation, hypertension, hyperlipidemia  Past Family History: Noncontributory  Social History: Nonsmoker, non-drinker at this time    Review of Systems:  Constitutional: Denies fever, chills  Cardiac: Denies chest pain, palpitations  Except as dictated above, all other systems reviewed and are negative     Physical Exam  Vitals: Blood pressure 134/86.  Pulse 81.  Temperature 97.6.  Respirations 18  Constitutional: No acute distress  Eyes: No lid lag or proptosis. PERRLA  HENT: External ears normal. No lesions on the lips  Respiratory: Good air entry bilaterally.  No wheezing or crackles  Cardiovascular: Normal S1-S2.  No murmurs  Gastrointestinal: No visible veins or masses. Good bowel sounds  Skin: No abnormal rashes. No digital cyanosis  Neurologic: Cranial nerves grossly intact. Sensation grossly intact  Psychiatric: Alert. Normal Affect.     Assessments   Diagnosis Orders   1. Paroxysmal atrial fibrillation (HCC)        2. Vascular dementia with agitation, unspecified dementia severity (HCC)        3. Depression, major, recurrent, mild (HCC)        4. Essential hypertension        5. Mixed hyperlipidemia        6. Osteoarthritis of knee, unspecified laterality, unspecified osteoarthritis type        7. Malignant neoplasm  [address] [facility]  [facility] [id number]  Dept: [phone]    2021     Willy Frederick (:  1963)is a 62 Fabio Drew MD, Neurology, [facility]      

## 2024-04-24 LAB
ANION GAP SERPL CALCULATED.3IONS-SCNC: 4.4 MMOL/L (ref 3–11)
APTT: 33.2 SEC (ref 22–32)
BASOPHILS %: 0.59 (ref 0–3)
BASOPHILS ABSOLUTE: 0.05 (ref 0–0.3)
BUN BLDV-MCNC: 20 MG/DL (ref 9–20)
C-REACTIVE PROTEIN: 2.7 MG/DL (ref 0–1)
CALCIUM SERPL-MCNC: 8.2 MG/DL (ref 8.4–10.2)
CHLORIDE BLD-SCNC: 105 MMOL/L (ref 98–120)
CO2: 30 MMOL/L (ref 22–31)
CREAT SERPL-MCNC: 1 MG/DL (ref 0.7–1.3)
EOSINOPHILS %: 1.37 (ref 0–10)
EOSINOPHILS ABSOLUTE: 0.12 (ref 0–1.1)
GFR CALCULATED: > 60
GLUCOSE: 125 MG/DL (ref 75–110)
HCT VFR BLD CALC: 44.7 % (ref 42–52)
HEMOGLOBIN: 13.5 (ref 13.8–17.8)
INR BLD: 1.9 RATIO (ref 0.8–1.2)
LYMPHOCYTE %: 11.68 (ref 20–51.1)
LYMPHOCYTES ABSOLUTE: 0.99 (ref 1–5.5)
MCH RBC QN AUTO: 29.6 PG (ref 28.5–32.5)
MCHC RBC AUTO-ENTMCNC: 30.1 G/DL (ref 32–37)
MCV RBC AUTO: 98.2 FL (ref 80–94)
MONOCYTES %: 7.12 (ref 1.7–9.3)
MONOCYTES ABSOLUTE: 0.61 (ref 0.1–1)
NEUTROPHILS ABSOLUTE: 6.75 (ref 2–8.1)
NEUTROPHILS RELATIVE PERCENT: 79.25 (ref 42.2–75.2)
PDW BLD-RTO: 14.5 % (ref 10–15.5)
PLATELET # BLD: 248.1 THOU/MM3 (ref 130–400)
POTASSIUM SERPL-SCNC: 4.6 MMOL/L (ref 3.6–5)
PROTHROMBIN TIME: 21.7 SEC (ref 9.3–12.5)
RBC: 4.56 M/UL (ref 4.7–6.1)
SEDIMENTATION RATE, ERYTHROCYTE: 6 MM/HR (ref 0–15)
SODIUM BLD-SCNC: 140 MMOL/L (ref 135–145)
WBC: 8.5 THOU/ML3 (ref 4.8–10.8)

## 2024-04-27 LAB
CREAT SERPL-MCNC: 0.9 MG/DL (ref 0.7–1.3)
GFR CALCULATED: > 60
VANCOMYCIN TROUGH: 8.92 MCG/ML (ref 10–20)

## 2024-05-01 ENCOUNTER — OUTSIDE SERVICES (OUTPATIENT)
Dept: INTERNAL MEDICINE | Age: 79
End: 2024-05-01
Payer: MEDICARE

## 2024-05-01 DIAGNOSIS — E78.2 MIXED HYPERLIPIDEMIA: ICD-10-CM

## 2024-05-01 DIAGNOSIS — C61 MALIGNANT NEOPLASM OF PROSTATE (HCC): ICD-10-CM

## 2024-05-01 DIAGNOSIS — F33.0 DEPRESSION, MAJOR, RECURRENT, MILD (HCC): ICD-10-CM

## 2024-05-01 DIAGNOSIS — I48.0 PAROXYSMAL ATRIAL FIBRILLATION (HCC): Primary | ICD-10-CM

## 2024-05-01 DIAGNOSIS — M17.9 OSTEOARTHRITIS OF KNEE, UNSPECIFIED LATERALITY, UNSPECIFIED OSTEOARTHRITIS TYPE: ICD-10-CM

## 2024-05-01 DIAGNOSIS — F01.511 VASCULAR DEMENTIA WITH AGITATION, UNSPECIFIED DEMENTIA SEVERITY (HCC): ICD-10-CM

## 2024-05-01 DIAGNOSIS — I10 ESSENTIAL HYPERTENSION: ICD-10-CM

## 2024-05-01 LAB — VANCOMYCIN TROUGH: 17.64 MCG/ML (ref 10–20)

## 2024-05-01 PROCEDURE — 99309 SBSQ NF CARE MODERATE MDM 30: CPT | Performed by: INTERNAL MEDICINE

## 2024-05-02 LAB
ANION GAP SERPL CALCULATED.3IONS-SCNC: 1.4 MMOL/L (ref 3–11)
BASOPHILS ABSOLUTE: 0.07 (ref 0–0.3)
BASOPHILS RELATIVE PERCENT: 0.81 (ref 0–3)
BUN BLDV-MCNC: 15 MG/DL (ref 9–20)
C-REACTIVE PROTEIN: 3.5 MG/DL (ref 0–1)
CALCIUM SERPL-MCNC: 7.8 MG/DL (ref 8.4–10.2)
CHLORIDE BLD-SCNC: 107 MMOL/L (ref 98–120)
CO2: 31 MMOL/L (ref 22–31)
CREAT SERPL-MCNC: 0.9 MG/DL (ref 0.7–1.3)
EOSINOPHILS ABSOLUTE: 0.21 (ref 0–1.1)
EOSINOPHILS RELATIVE PERCENT: 2.59 (ref 0–10)
GFR, ESTIMATED: > 60
GLUCOSE: 92 MG/DL (ref 75–110)
HCT VFR BLD CALC: 44.6 % (ref 42–52)
HEMOGLOBIN: 13.7 (ref 13.8–17.8)
LYMPHOCYTES ABSOLUTE: 1.16 (ref 1–5.5)
LYMPHOCYTES RELATIVE PERCENT: 14.02 (ref 20–51.1)
MCH RBC QN AUTO: 30.1 PG (ref 28.5–32.5)
MCHC RBC AUTO-ENTMCNC: 30.7 G/DL (ref 32–37)
MCV RBC AUTO: 98.2 FL (ref 80–94)
MONOCYTES ABSOLUTE: 0.5 (ref 0.1–1)
MONOCYTES RELATIVE PERCENT: 6.1 (ref 1.7–9.3)
NEUTROPHILS ABSOLUTE: 6.31 (ref 2–8.1)
NEUTROPHILS RELATIVE PERCENT: 76.48 (ref 42.2–75.2)
PDW BLD-RTO: 14.4 % (ref 10–15.5)
PLATELET # BLD: 178.5 THOU/MM3 (ref 130–400)
POTASSIUM SERPL-SCNC: 4.1 MMOL/L (ref 3.6–5)
RBC # BLD: 4.54 M/UL (ref 4.7–6.1)
SEDIMENTATION RATE, ERYTHROCYTE: 4 MM/HR (ref 0–15)
SODIUM BLD-SCNC: 139 MMOL/L (ref 135–145)
WBC # BLD: 8.3 THOU/ML3 (ref 4.8–10.8)

## 2024-05-02 NOTE — PROGRESS NOTES
Date of Encounter: 5/1/2024  Patient:  Sang Martinez  YOB: 1945      Chief Complaint: Follow up on chronic medical conditions    History of Present Illness:  No issues reported by nursing staff. No reported complaints.      Past Medical/Surgical/Social History: Per admission note    Physical Exam  Vitals: Blood Pressure 138/76. Pulse 83. Temperature 97.4. Respirations 18  Constitutional: No acute distress  Eyes: Sclerae nonicteric. No lid lag or proptosis  HENT: External ears normal. No external lesions on the nose. No lesions on the lips  Neck: No gross masses. Trachea visibly midline  Respiratory: Good air entry bilaterally. No wheezing or crackles  Cardiovascular: Normal S1-S2. No murmurs  Gastrointestinal: No visible masses.   Skin: No abnormal rashes. No digital cyanosis  Neurologic: Cranial nerves grossly intact    Assessments   Diagnosis Orders   1. Paroxysmal atrial fibrillation (HCC)        2. Vascular dementia with agitation, unspecified dementia severity (HCC)        3. Depression, major, recurrent, mild (HCC)        4. Essential hypertension        5. Mixed hyperlipidemia        6. Osteoarthritis of knee, unspecified laterality, unspecified osteoarthritis type        7. Malignant neoplasm of prostate (HCC)            Plan  Was obtained who recommended that I will be notified of any changes to the patient's condition. Unless otherwise noted, a gradual dose reduction in medications is not indicated at this time.      This note has been created using the Epic electronic health record, and dictated in part by Dragon [employer] One dictation system. Despite the documenting physician's best efforts, there may be errors in spelling, grammar or syntax.

## 2024-05-15 LAB
ANION GAP SERPL CALCULATED.3IONS-SCNC: 4.9 MMOL/L (ref 3–11)
BASOPHILS ABSOLUTE: 0.09 (ref 0–0.3)
BASOPHILS RELATIVE PERCENT: 1.12 (ref 0–3)
BUN BLDV-MCNC: 24 MG/DL (ref 9–20)
C-REACTIVE PROTEIN: 1.5 MG/DL (ref 0–1)
CALCIUM SERPL-MCNC: 8.3 MG/DL (ref 8.4–10.2)
CHLORIDE BLD-SCNC: 110 MMOL/L (ref 98–120)
CO2: 25 MMOL/L (ref 22–31)
CREAT SERPL-MCNC: 1.1 MG/DL (ref 0.7–1.3)
EOSINOPHILS ABSOLUTE: 0.14 (ref 0–1.1)
EOSINOPHILS RELATIVE PERCENT: 1.86 (ref 0–10)
GFR, ESTIMATED: > 60
GLUCOSE: 101 MG/DL (ref 75–110)
HCT VFR BLD CALC: 43.8 % (ref 42–52)
HEMOGLOBIN: 13.8 (ref 13.8–17.8)
LYMPHOCYTES ABSOLUTE: 0.98 (ref 1–5.5)
LYMPHOCYTES RELATIVE PERCENT: 12.58 (ref 20–51.1)
MCH RBC QN AUTO: 29.9 PG (ref 28.5–32.5)
MCHC RBC AUTO-ENTMCNC: 31.6 G/DL (ref 32–37)
MCV RBC AUTO: 94.7 FL (ref 80–94)
MONOCYTES ABSOLUTE: 0.51 (ref 0.1–1)
MONOCYTES RELATIVE PERCENT: 6.52 (ref 1.7–9.3)
NEUTROPHILS ABSOLUTE: 6.05 (ref 2–8.1)
NEUTROPHILS RELATIVE PERCENT: 77.92 (ref 42.2–75.2)
PDW BLD-RTO: 13.6 % (ref 10–15.5)
PLATELET # BLD: 192.5 THOU/MM3 (ref 130–400)
POTASSIUM SERPL-SCNC: 4.1 MMOL/L (ref 3.6–5)
RBC # BLD: 4.62 M/UL (ref 4.7–6.1)
SED RATE, AUTOMATED: 5 MM/HR (ref 0–15)
SODIUM BLD-SCNC: 140 MMOL/L (ref 135–145)
WBC # BLD: 7.8 THOU/ML3 (ref 4.8–10.8)

## 2024-05-22 LAB
ANION GAP SERPL CALCULATED.3IONS-SCNC: 2.3 MMOL/L (ref 3–11)
BUN BLDV-MCNC: 26 MG/DL (ref 9–20)
C-REACTIVE PROTEIN: 2.3 MG/DL (ref 0–1)
CALCIUM SERPL-MCNC: 8.3 MG/DL (ref 8.4–10.2)
CHLORIDE BLD-SCNC: 113 MMOL/L (ref 98–120)
CO2: 25 MMOL/L (ref 22–31)
CREAT SERPL-MCNC: 1 MG/DL (ref 0.7–1.3)
GFR, ESTIMATED: > 60
GLUCOSE: 97 MG/DL (ref 75–110)
HCT VFR BLD CALC: 40.8 % (ref 42–52)
HEMOGLOBIN: 13.6 (ref 13.8–17.8)
MCH RBC QN AUTO: 31.2 PG (ref 28.5–32.5)
MCHC RBC AUTO-ENTMCNC: 33.3 G/DL (ref 32–37)
MCV RBC AUTO: 93.9 FL (ref 80–94)
PDW BLD-RTO: 13.5 % (ref 10–15.5)
PLATELET # BLD: 158.7 THOU/MM3 (ref 130–400)
POTASSIUM SERPL-SCNC: 4.1 MMOL/L (ref 3.6–5)
RBC # BLD: 4.34 M/UL (ref 4.7–6.1)
SED RATE, AUTOMATED: 7 MM/HR (ref 0–15)
SODIUM BLD-SCNC: 140 MMOL/L (ref 135–145)
WBC # BLD: 8 THOU/ML3 (ref 4.8–10.8)

## 2024-05-29 ENCOUNTER — OUTSIDE SERVICES (OUTPATIENT)
Dept: INTERNAL MEDICINE | Age: 79
End: 2024-05-29

## 2024-05-29 DIAGNOSIS — M17.9 OSTEOARTHRITIS OF KNEE, UNSPECIFIED LATERALITY, UNSPECIFIED OSTEOARTHRITIS TYPE: ICD-10-CM

## 2024-05-29 DIAGNOSIS — I10 ESSENTIAL HYPERTENSION: ICD-10-CM

## 2024-05-29 DIAGNOSIS — F33.0 DEPRESSION, MAJOR, RECURRENT, MILD (HCC): ICD-10-CM

## 2024-05-29 DIAGNOSIS — C61 MALIGNANT NEOPLASM OF PROSTATE (HCC): ICD-10-CM

## 2024-05-29 DIAGNOSIS — F01.511 VASCULAR DEMENTIA WITH AGITATION, UNSPECIFIED DEMENTIA SEVERITY (HCC): ICD-10-CM

## 2024-05-29 DIAGNOSIS — I48.0 PAROXYSMAL ATRIAL FIBRILLATION (HCC): Primary | ICD-10-CM

## 2024-05-29 DIAGNOSIS — F03.90 DEMENTIA WITHOUT BEHAVIORAL DISTURBANCE (HCC): ICD-10-CM

## 2024-05-29 DIAGNOSIS — E78.2 MIXED HYPERLIPIDEMIA: ICD-10-CM

## 2024-05-29 LAB
C-REACTIVE PROTEIN: 2.2 MG/DL (ref 0–1)
SED RATE, AUTOMATED: 7 MM/HR (ref 0–15)

## 2024-06-05 LAB
ANION GAP SERPL CALCULATED.3IONS-SCNC: 1.4 MMOL/L (ref 3–11)
BASOPHILS ABSOLUTE: 0.07 (ref 0–0.3)
BASOPHILS RELATIVE PERCENT: 0.86 (ref 0–3)
BUN BLDV-MCNC: 24 MG/DL (ref 9–20)
C-REACTIVE PROTEIN: 5 MG/DL (ref 0–1)
CALCIUM SERPL-MCNC: 8.1 MG/DL (ref 8.4–10.2)
CHLORIDE BLD-SCNC: 112 MMOL/L (ref 98–120)
CO2: 27 MMOL/L (ref 22–31)
CREAT SERPL-MCNC: 0.8 MG/DL (ref 0.7–1.3)
EOSINOPHILS ABSOLUTE: 0.3 (ref 0–1.1)
EOSINOPHILS RELATIVE PERCENT: 3.63 (ref 0–10)
GFR, ESTIMATED: > 60
GLUCOSE: 91 MG/DL (ref 75–110)
HCT VFR BLD CALC: 31.5 % (ref 42–52)
HEMOGLOBIN: 10.1 (ref 13.8–17.8)
LYMPHOCYTES ABSOLUTE: 1.02 (ref 1–5.5)
LYMPHOCYTES RELATIVE PERCENT: 12.38 (ref 20–51.1)
MCH RBC QN AUTO: 29.9 PG (ref 28.5–32.5)
MCHC RBC AUTO-ENTMCNC: 32.1 G/DL (ref 32–37)
MCV RBC AUTO: 93 FL (ref 80–94)
MONOCYTES ABSOLUTE: 0.6 (ref 0.1–1)
MONOCYTES RELATIVE PERCENT: 7.27 (ref 1.7–9.3)
NEUTROPHILS ABSOLUTE: 6.26 (ref 2–8.1)
NEUTROPHILS RELATIVE PERCENT: 75.87 (ref 42.2–75.2)
PDW BLD-RTO: 13.5 % (ref 10–15.5)
PLATELET # BLD: 148.6 THOU/MM3 (ref 130–400)
POTASSIUM SERPL-SCNC: 4.1 MMOL/L (ref 3.6–5)
RBC # BLD: 3.39 M/UL (ref 4.7–6.1)
SED RATE, AUTOMATED: 16 MM/HR (ref 0–15)
SODIUM BLD-SCNC: 140 MMOL/L (ref 135–145)
WBC # BLD: 8.2 THOU/ML3 (ref 4.8–10.8)

## 2024-06-06 NOTE — PROGRESS NOTES
Date of Encounter: 5/29/2024  Patient:  Sang Martinez  YOB: 1945      Chief Complaint: Follow up on chronic medical conditions    History of Present Illness:  No issues reported by nursing staff. No reported complaints.      Past Medical/Surgical/Social History: Per admission note    Physical Exam  Vitals: Blood Pressure 130/67. Pulse 57. Temperature 97.6. Respirations 18  Constitutional: No acute distress  Eyes: Sclerae nonicteric. No lid lag or proptosis  HENT: External ears normal. No external lesions on the nose. No lesions on the lips  Neck: No gross masses. Trachea visibly midline  Respiratory: Good air entry bilaterally. No wheezing or crackles  Cardiovascular: Normal S1-S2. No murmurs  Gastrointestinal: No visible masses.   Skin: No abnormal rashes. No digital cyanosis  Neurologic: Cranial nerves grossly intact    Assessments   Diagnosis Orders   1. Paroxysmal atrial fibrillation (HCC)        2. Vascular dementia with agitation, unspecified dementia severity (HCC)        3. Depression, major, recurrent, mild (HCC)        4. Essential hypertension        5. Mixed hyperlipidemia        6. Osteoarthritis of knee, unspecified laterality, unspecified osteoarthritis type        7. Malignant neoplasm of prostate (HCC)        8. Dementia without behavioral disturbance (HCC)            Plan  I will be notified of any changes to the patient's condition. Unless otherwise noted, a gradual dose reduction in medications is not indicated at this time.      This note has been created using the Epic electronic health record, and dictated in part by [employer] One dictation system. Despite the documenting physician's best efforts, there may be errors in spelling, grammar or syntax.

## 2024-06-15 LAB
ANION GAP SERPL CALCULATED.3IONS-SCNC: 2.5 MMOL/L (ref 3–11)
BASOPHILS ABSOLUTE: 0.05 X10E3/?L (ref 0–0.3)
BUN BLDV-MCNC: 21 MG/DL (ref 9–20)
C-REACTIVE PROTEIN: 4.6 MG/DL (ref 0–1)
CALCIUM SERPL-MCNC: 8.1 MG/DL (ref 8.4–10.2)
CHLORIDE BLD-SCNC: 110 MMOL/L (ref 98–120)
CO2: 26 MMOL/L (ref 22–31)
CREAT SERPL-MCNC: 0.8 MG/DL (ref 0.7–1.3)
EOSINOPHILS ABSOLUTE: 0.12 X10E3/?L (ref 0–1.1)
EOSINOPHILS RELATIVE PERCENT: 1.56 % (ref 0–10)
GFR, ESTIMATED: > 60
GLUCOSE: 77 MG/DL (ref 75–110)
HCT VFR BLD CALC: 33.8 % (ref 42–52)
HEMOGLOBIN: 10.5 G/DL (ref 13.8–17.8)
LYMPHOCYTES ABSOLUTE: 0.8 X10E3/?L (ref 1–5.5)
LYMPHOCYTES RELATIVE PERCENT: 10.89 % (ref 20–51.1)
MCH RBC QN AUTO: 29.2 PG (ref 28.5–32.5)
MCHC RBC AUTO-ENTMCNC: 31 G/DL (ref 32–37)
MCV RBC AUTO: 94.2 FL (ref 80–94)
MONOCYTES ABSOLUTE: 0.57 X10E3/?L (ref 0.1–1)
MONOCYTES RELATIVE PERCENT: 7.73 % (ref 1.7–9.3)
NEUTROPHILS ABSOLUTE: 5.85 X10E3/?L (ref 2–8.1)
NEUTROPHILS RELATIVE PERCENT: 79.11 % (ref 42.2–75.2)
PDW BLD-RTO: 14.2 % (ref 10–15.5)
PLATELET # BLD: 186.5 THOU/MM3 (ref 130–400)
POTASSIUM SERPL-SCNC: 4 MMOL/L (ref 3.6–5)
RBC # BLD: 3.59 M/UL (ref 4.7–6.1)
SED RATE, AUTOMATED: 18 MM/HR (ref 0–15)
SODIUM BLD-SCNC: 139 MMOL/L (ref 135–145)
WBC # BLD: 7.4 THOU/ML3 (ref 4.8–10.8)

## 2024-06-20 LAB
ANION GAP SERPL CALCULATED.3IONS-SCNC: 6.3 MMOL/L (ref 3–11)
BASOPHILS ABSOLUTE: 0.05 X10E3/?L (ref 0–0.3)
BASOPHILS RELATIVE PERCENT: 0.52 % (ref 0–3)
BUN BLDV-MCNC: 22 MG/DL (ref 9–20)
C-REACTIVE PROTEIN: 14.2 MG/DL (ref 0–1)
CALCIUM SERPL-MCNC: 8.6 MG/DL (ref 8.4–10.2)
CHLORIDE BLD-SCNC: 107 MMOL/L (ref 98–120)
CO2: 31 MMOL/L (ref 22–31)
CREAT SERPL-MCNC: 0.8 MG/DL (ref 0.7–1.3)
EOSINOPHILS ABSOLUTE: 0.15 X10E3/?L (ref 0–1.1)
EOSINOPHILS RELATIVE PERCENT: 1.74 % (ref 0–10)
GFR, ESTIMATED: > 60
GLUCOSE: 85 MG/DL (ref 75–110)
HCT VFR BLD CALC: 35.3 % (ref 42–52)
HEMOGLOBIN: 10.7 G/DL (ref 13.8–17.8)
LYMPHOCYTES ABSOLUTE: 0.99 X10E3/?L (ref 1–5.5)
LYMPHOCYTES RELATIVE PERCENT: 11.32 % (ref 20–51.1)
MCH RBC QN AUTO: 28.6 PG (ref 28.5–32.5)
MCHC RBC AUTO-ENTMCNC: 30.2 G/DL (ref 32–37)
MCV RBC AUTO: 94.8 FL (ref 80–94)
MONOCYTES ABSOLUTE: 0.59 X10E3/?L (ref 0.1–1)
MONOCYTES RELATIVE PERCENT: 6.72 % (ref 1.7–9.3)
NEUTROPHILS ABSOLUTE: 6.97 X10E3/?L (ref 2–8.1)
NEUTROPHILS RELATIVE PERCENT: 79.7 % (ref 42.2–75.2)
PDW BLD-RTO: 14.3 % (ref 10–15.5)
PLATELET # BLD: 209.5 THOU/MM3 (ref 130–400)
POTASSIUM SERPL-SCNC: 3.9 MMOL/L (ref 3.6–5)
RBC # BLD: 3.72 M/UL (ref 4.7–6.1)
SED RATE, AUTOMATED: 23 MM/HR (ref 0–15)
SODIUM BLD-SCNC: 144 MMOL/L (ref 135–145)
WBC # BLD: 8.7 THOU/ML3 (ref 4.8–10.8)

## 2024-06-26 ENCOUNTER — OUTSIDE SERVICES (OUTPATIENT)
Dept: INTERNAL MEDICINE | Age: 79
End: 2024-06-26
Payer: MEDICARE

## 2024-06-26 DIAGNOSIS — F01.511 VASCULAR DEMENTIA WITH AGITATION, UNSPECIFIED DEMENTIA SEVERITY (HCC): ICD-10-CM

## 2024-06-26 DIAGNOSIS — F33.0 DEPRESSION, MAJOR, RECURRENT, MILD (HCC): ICD-10-CM

## 2024-06-26 DIAGNOSIS — I48.0 PAROXYSMAL ATRIAL FIBRILLATION (HCC): Primary | ICD-10-CM

## 2024-06-26 DIAGNOSIS — I10 ESSENTIAL HYPERTENSION: ICD-10-CM

## 2024-06-26 DIAGNOSIS — E78.2 MIXED HYPERLIPIDEMIA: ICD-10-CM

## 2024-06-26 DIAGNOSIS — M17.9 OSTEOARTHRITIS OF KNEE, UNSPECIFIED LATERALITY, UNSPECIFIED OSTEOARTHRITIS TYPE: ICD-10-CM

## 2024-06-26 PROCEDURE — 99309 SBSQ NF CARE MODERATE MDM 30: CPT | Performed by: INTERNAL MEDICINE

## 2024-06-27 LAB
ANION GAP SERPL CALCULATED.3IONS-SCNC: 2.8 MMOL/L (ref 3–11)
BASOPHILS ABSOLUTE: 0.06 X10E3/?L (ref 0–0.3)
BASOPHILS RELATIVE PERCENT: 0.77 % (ref 0–3)
BUN BLDV-MCNC: 22 MG/DL (ref 9–20)
C-REACTIVE PROTEIN: 7.4 MG/DL (ref 0–1)
CALCIUM SERPL-MCNC: 8 MG/DL (ref 8.4–10.2)
CHLORIDE BLD-SCNC: 106 MMOL/L (ref 98–120)
CO2: 31 MMOL/L (ref 22–31)
CREAT SERPL-MCNC: 0.8 MG/DL (ref 0.7–1.3)
EOSINOPHILS ABSOLUTE: 0.15 X10E3/?L (ref 0–1.1)
EOSINOPHILS RELATIVE PERCENT: 1.96 % (ref 0–10)
GFR, ESTIMATED: > 60
GLUCOSE: 88 MG/DL (ref 75–110)
HCT VFR BLD CALC: 35.3 % (ref 42–52)
HEMOGLOBIN: 10.8 G/DL (ref 13.8–17.8)
LYMPHOCYTES ABSOLUTE: 0.99 X10E3/?L (ref 1–5.5)
LYMPHOCYTES RELATIVE PERCENT: 12.58 % (ref 20–51.1)
MCH RBC QN AUTO: 28.4 PG (ref 28.5–32.5)
MCHC RBC AUTO-ENTMCNC: 30.6 G/DL (ref 32–37)
MCV RBC AUTO: 92.8 FL (ref 80–94)
MONOCYTES ABSOLUTE: 0.46 X10E3/?L (ref 0.1–1)
MONOCYTES RELATIVE PERCENT: 5.8 % (ref 1.7–9.3)
NEUTROPHILS ABSOLUTE: 6.19 X10E3/?L (ref 2–8.1)
NEUTROPHILS RELATIVE PERCENT: 78.89 % (ref 42.2–75.2)
PDW BLD-RTO: 14 % (ref 10–15.5)
PLATELET # BLD: 269.1 THOU/MM3 (ref 130–400)
POTASSIUM SERPL-SCNC: 4.2 MMOL/L (ref 3.6–5)
RBC # BLD: 3.81 M/UL (ref 4.7–6.1)
SED RATE, AUTOMATED: 19 MM/HR (ref 0–15)
SODIUM BLD-SCNC: 140 MMOL/L (ref 135–145)
WBC # BLD: 7.8 THOU/ML3 (ref 4.8–10.8)

## 2024-07-03 LAB
ANION GAP SERPL CALCULATED.3IONS-SCNC: 8.5 MMOL/L (ref 3–11)
BASOPHILS ABSOLUTE: 0.04 X10E3/?L (ref 0–0.3)
BASOPHILS RELATIVE PERCENT: 0.3 % (ref 0–3)
BUN BLDV-MCNC: 19 MG/DL (ref 9–20)
C-REACTIVE PROTEIN: 15.6 MG/DL (ref 0–1)
CALCIUM SERPL-MCNC: 7.8 MG/DL (ref 8.4–10.2)
CHLORIDE BLD-SCNC: 105 MMOL/L (ref 98–120)
CO2: 25 MMOL/L (ref 22–31)
CREAT SERPL-MCNC: 0.9 MG/DL (ref 0.7–1.3)
EOSINOPHILS ABSOLUTE: 0.14 X10E3/?L (ref 0–1.1)
EOSINOPHILS RELATIVE PERCENT: 1.17 % (ref 0–10)
GFR, ESTIMATED: > 60
GLUCOSE: 76 MG/DL (ref 75–110)
HCT VFR BLD CALC: 39.7 % (ref 42–52)
HEMOGLOBIN: 12.5 G/DL (ref 13.8–17.8)
LYMPHOCYTES ABSOLUTE: 1.69 X10E3/?L (ref 1–5.5)
LYMPHOCYTES RELATIVE PERCENT: 14.47 % (ref 20–51.1)
MCH RBC QN AUTO: 28.4 PG (ref 28.5–32.5)
MCHC RBC AUTO-ENTMCNC: 31.4 G/DL (ref 32–37)
MCV RBC AUTO: 90.5 FL (ref 80–94)
MONOCYTES ABSOLUTE: 0.52 X10E3/?L (ref 0.1–1)
MONOCYTES RELATIVE PERCENT: 4.49 % (ref 1.7–9.3)
NEUTROPHILS ABSOLUTE: 9.27 X10E3/?L (ref 2–8.1)
NEUTROPHILS RELATIVE PERCENT: 79.57 % (ref 42.2–75.2)
PDW BLD-RTO: 14.6 % (ref 10–15.5)
PLATELET # BLD: 434.7 THOU/MM3 (ref 130–400)
POTASSIUM SERPL-SCNC: 5 MMOL/L (ref 3.6–5)
RBC # BLD: 4.38 M/UL (ref 4.7–6.1)
SED RATE, AUTOMATED: 25 MM/HR (ref 0–15)
SODIUM BLD-SCNC: 139 MMOL/L (ref 135–145)
WBC # BLD: 11.7 THOU/ML3 (ref 4.8–10.8)

## 2024-07-10 LAB
ANION GAP SERPL CALCULATED.3IONS-SCNC: 4.9 MMOL/L (ref 3–11)
BASOPHILS ABSOLUTE: 0.05 X10E3/?L (ref 0–0.3)
BASOPHILS RELATIVE PERCENT: 0.57 % (ref 0–3)
BUN BLDV-MCNC: 26 MG/DL (ref 9–20)
C-REACTIVE PROTEIN: 4.3 MG/DL (ref 0–1)
CALCIUM SERPL-MCNC: 7.8 MG/DL (ref 8.4–10.2)
CHLORIDE BLD-SCNC: 104 MMOL/L (ref 98–120)
CO2: 30 MMOL/L (ref 22–31)
CREAT SERPL-MCNC: 0.9 MG/DL (ref 0.7–1.3)
EOSINOPHILS ABSOLUTE: 0.1 X10E3/?L (ref 0–1.1)
EOSINOPHILS RELATIVE PERCENT: 1.05 % (ref 0–10)
GFR, ESTIMATED: > 60
GLUCOSE: 73 MG/DL (ref 75–110)
HCT VFR BLD CALC: 36.9 % (ref 42–52)
HEMOGLOBIN: 11.3 G/DL (ref 13.8–17.8)
LYMPHOCYTES ABSOLUTE: 1.24 X10E3/?L (ref 1–5.5)
LYMPHOCYTES RELATIVE PERCENT: 12.84 % (ref 20–51.1)
MCH RBC QN AUTO: 27.9 PG (ref 28.5–32.5)
MCHC RBC AUTO-ENTMCNC: 30.6 G/DL (ref 32–37)
MCV RBC AUTO: 91.3 FL (ref 80–94)
MONOCYTES ABSOLUTE: 0.48 X10E3/?L (ref 0.1–1)
MONOCYTES RELATIVE PERCENT: 5.04 % (ref 1.7–9.3)
NEUTROPHILS ABSOLUTE: 7.75 X10E3/?L (ref 2–8.1)
NEUTROPHILS RELATIVE PERCENT: 80.5 % (ref 42.2–75.2)
PDW BLD-RTO: 14.4 % (ref 10–15.5)
PLATELET # BLD: 349.1 THOU/MM3 (ref 130–400)
POTASSIUM SERPL-SCNC: 4 MMOL/L (ref 3.6–5)
RBC # BLD: 4.05 M/UL (ref 4.7–6.1)
SED RATE, AUTOMATED: 23 MM/HR (ref 0–15)
SODIUM BLD-SCNC: 138 MMOL/L (ref 135–145)
WBC # BLD: 9.6 THOU/ML3 (ref 4.8–10.8)

## 2024-07-17 LAB
ANION GAP SERPL CALCULATED.3IONS-SCNC: 3.3 MMOL/L (ref 3–11)
BASOPHILS ABSOLUTE: 0.05 X10E3/?L (ref 0–0.3)
BASOPHILS RELATIVE PERCENT: 0.37 % (ref 0–3)
BUN BLDV-MCNC: 22 MG/DL (ref 9–20)
C-REACTIVE PROTEIN: 15.9 MG/DL (ref 0–1)
CALCIUM SERPL-MCNC: 7.8 MG/DL (ref 8.4–10.2)
CHLORIDE BLD-SCNC: 107 MMOL/L (ref 98–120)
CO2: 29 MMOL/L (ref 22–31)
CREAT SERPL-MCNC: 0.8 MG/DL (ref 0.7–1.3)
EOSINOPHILS ABSOLUTE: 0.09 X10E3/?L (ref 0–1.1)
EOSINOPHILS RELATIVE PERCENT: 0.69 % (ref 0–10)
GFR, ESTIMATED: > 60
GLUCOSE: 87 MG/DL (ref 75–110)
HCT VFR BLD CALC: 35 % (ref 42–52)
HEMOGLOBIN: 10.8 G/DL (ref 13.8–17.8)
LYMPHOCYTES ABSOLUTE: 1.32 X10E3/?L (ref 1–5.5)
LYMPHOCYTES RELATIVE PERCENT: 9.63 % (ref 20–51.1)
MCH RBC QN AUTO: 28.1 PG (ref 28.5–32.5)
MCHC RBC AUTO-ENTMCNC: 31 G/DL (ref 32–37)
MCV RBC AUTO: 90.9 FL (ref 80–94)
MONOCYTES ABSOLUTE: 0.62 X10E3/?L (ref 0.1–1)
MONOCYTES RELATIVE PERCENT: 4.54 % (ref 1.7–9.3)
NEUTROPHILS ABSOLUTE: 11.63 X10E3/?L (ref 2–8.1)
NEUTROPHILS RELATIVE PERCENT: 84.77 % (ref 42.2–75.2)
PDW BLD-RTO: 15.1 % (ref 10–15.5)
PLATELET # BLD: 246.4 THOU/MM3 (ref 130–400)
POTASSIUM SERPL-SCNC: 4.2 MMOL/L (ref 3.6–5)
RBC # BLD: 3.85 M/UL (ref 4.7–6.1)
SED RATE, AUTOMATED: 38 MM/HR (ref 0–15)
SODIUM BLD-SCNC: 140 MMOL/L (ref 135–145)
WBC # BLD: 13.7 THOU/ML3 (ref 4.8–10.8)

## 2024-07-17 NOTE — PROGRESS NOTES
Date of Encounter: 6/26/2024  Patient:  Sang Martinez  YOB: 1945      Chief Complaint: Follow up on chronic medical conditions    History of Present Illness:  No issues reported by nursing staff. No reported complaints.      Past Medical/Surgical/Social History: Per admission note    Physical Exam  Vitals: Blood Pressure 129/96. Pulse 90. Temperature 97.2. Respirations 16  Constitutional: No acute distress  Eyes: Sclerae nonicteric. No lid lag or proptosis  HENT: External ears normal. No external lesions on the nose. No lesions on the lips  Neck: No gross masses. Trachea visibly midline  Respiratory: Good air entry bilaterally. No wheezing or crackles  Cardiovascular: Normal S1-S2. No murmurs  Gastrointestinal: No visible masses.   Skin: No abnormal rashes. No digital cyanosis  Neurologic: Cranial nerves grossly intact    Assessments   Diagnosis Orders   1. Paroxysmal atrial fibrillation (HCC)        2. Vascular dementia with agitation, unspecified dementia severity (HCC)        3. Depression, major, recurrent, mild (HCC)        4. Essential hypertension        5. Mixed hyperlipidemia        6. Osteoarthritis of knee, unspecified laterality, unspecified osteoarthritis type            Plan  I will be notified of any changes to the patient's condition. Unless otherwise noted, a gradual dose reduction in medications is not indicated at this time.      This note has been created using the Epic electronic health record, and dictated in part by [employer] One dictation system. Despite the documenting physician's best efforts, there may be errors in spelling, grammar or syntax.

## 2024-07-24 LAB
ANION GAP SERPL CALCULATED.3IONS-SCNC: 4.3 MMOL/L (ref 3–11)
BASOPHILS ABSOLUTE: 0.02 X10E3/?L (ref 0–0.3)
BASOPHILS RELATIVE PERCENT: 0.12 % (ref 0–3)
BUN BLDV-MCNC: 21 MG/DL (ref 9–20)
C-REACTIVE PROTEIN: 14 MG/DL (ref 0–1)
CALCIUM SERPL-MCNC: 7.5 MG/DL (ref 8.4–10.2)
CHLORIDE BLD-SCNC: 108 MMOL/L (ref 98–120)
CO2: 27 MMOL/L (ref 22–31)
CREAT SERPL-MCNC: 0.9 MG/DL (ref 0.7–1.3)
EOSINOPHILS ABSOLUTE: 0.05 X10E3/?L (ref 0–1.1)
EOSINOPHILS RELATIVE PERCENT: 0.34 % (ref 0–10)
GFR, ESTIMATED: > 60
GLUCOSE: 78 MG/DL (ref 75–110)
HCT VFR BLD CALC: 33.9 % (ref 42–52)
HEMOGLOBIN: 10.4 G/DL (ref 13.8–17.8)
LYMPHOCYTES ABSOLUTE: 1.03 X10E3/?L (ref 1–5.5)
LYMPHOCYTES RELATIVE PERCENT: 6.71 % (ref 20–51.1)
MCH RBC QN AUTO: 27.2 PG (ref 28.5–32.5)
MCHC RBC AUTO-ENTMCNC: 30.6 G/DL (ref 32–37)
MCV RBC AUTO: 89 FL (ref 80–94)
MONOCYTES ABSOLUTE: 0.5 X10E3/?L (ref 0.1–1)
MONOCYTES RELATIVE PERCENT: 3.25 % (ref 1.7–9.3)
NEUTROPHILS ABSOLUTE: 13.72 X10E3/?L (ref 2–8.1)
NEUTROPHILS RELATIVE PERCENT: 89.57 % (ref 42.2–75.2)
PDW BLD-RTO: 15.3 % (ref 10–15.5)
PLATELET # BLD: 297.9 THOU/MM3 (ref 130–400)
POTASSIUM SERPL-SCNC: 3.9 MMOL/L (ref 3.6–5)
RBC # BLD: 3.81 M/UL (ref 4.7–6.1)
SED RATE, AUTOMATED: 34 MM/HR (ref 0–15)
SODIUM BLD-SCNC: 139 MMOL/L (ref 135–145)
WBC # BLD: 15.3 THOU/ML3 (ref 4.8–10.8)